# Patient Record
Sex: FEMALE | Race: WHITE | NOT HISPANIC OR LATINO
[De-identification: names, ages, dates, MRNs, and addresses within clinical notes are randomized per-mention and may not be internally consistent; named-entity substitution may affect disease eponyms.]

---

## 2018-01-29 PROBLEM — Z00.00 ENCOUNTER FOR PREVENTIVE HEALTH EXAMINATION: Status: ACTIVE | Noted: 2018-01-29

## 2019-06-21 ENCOUNTER — APPOINTMENT (OUTPATIENT)
Dept: ORTHOPEDIC SURGERY | Facility: CLINIC | Age: 84
End: 2019-06-21
Payer: MEDICARE

## 2019-06-21 VITALS — BODY MASS INDEX: 21.16 KG/M2 | HEIGHT: 65 IN | WEIGHT: 127 LBS

## 2019-06-21 PROCEDURE — 99203 OFFICE O/P NEW LOW 30 MIN: CPT

## 2019-06-21 NOTE — HISTORY OF PRESENT ILLNESS
[de-identified] : 85 year old female presents for an evaluation of right hand mass that began developing years ago and she cannot attribute her pain to any specific injury or event. She has previously obtained a CT scan and XRays of her right hand, she presents with the films and imaging. Today she rates her pain a 0/10 and reports that she is not experiencing any pain about her hand or limitations in use of her right hand. She has no other complaints at this time.

## 2019-06-21 NOTE — ADDENDUM
[FreeTextEntry1] : I, Yvette Calixto, acted solely as a scribe for Dr. Michael Martínez on this date 06/21/2019.

## 2019-06-21 NOTE — END OF VISIT
[FreeTextEntry3] : All medical record entries made by the [facility] were at my, Dr. Michael Martínez, direction and personally dictated by me on 06/21/2019. I have reviewed the chart and agree that the record accurately reflects my personal performance of the history, physical exam, assessment and plan. I have also personally directed, reviewed, and agreed with the chart.

## 2019-06-21 NOTE — DISCUSSION/SUMMARY
[de-identified] : The underlying pathophysiology was reviewed in great detail with the patient as well as the various treatment options, including conservative treatment, observation, biopsy and surgery.\par \par I recommend that she follow up with an orthopedic tumor specialist, a referral was provided for Dr. Augustin Mendoza.

## 2019-06-24 ENCOUNTER — APPOINTMENT (OUTPATIENT)
Dept: ORTHOPEDIC SURGERY | Facility: CLINIC | Age: 84
End: 2019-06-24
Payer: MEDICARE

## 2019-06-24 DIAGNOSIS — R22.31 LOCALIZED SWELLING, MASS AND LUMP, RIGHT UPPER LIMB: ICD-10-CM

## 2019-06-24 PROCEDURE — 73130 X-RAY EXAM OF HAND: CPT | Mod: RT

## 2019-06-24 PROCEDURE — 99214 OFFICE O/P EST MOD 30 MIN: CPT

## 2019-06-24 NOTE — HISTORY OF PRESENT ILLNESS
[Stable] : stable [0] : currently ~his/her~ pain is 0 out of 10 [FreeTextEntry1] : This is an 85-year-old female comes in complaining of a RIGHT hand mass the she is known about some swelling between the 2nd and 3rd metacarpals dorsally but finds that in the past year she has had a hard mass in her palm. She has not stopped her from doing activities. She is able to make a fist. She does not have any pain associated with the mass. She had imaging done approximately 8 months ago and eventually went to a general orthopaedist to send the patient to me.

## 2019-06-24 NOTE — DATA REVIEWED
[Imaging Present] : Present [de-identified] : CT scan from 10/16/2018Shows a complex fatty mass that is going between the 1st and 2nd web space as well as between the 2nd 3rd and 3rd and 4th metacarpals. There is a calcified portion of this in the palm of the hand palmar to the metacarpals. This is deep to the flexor tendons but does not seem to detach any portion of the bone.\par \par x-rays today show both normal position. There is a well corticated mature bone seen in the palm of the hand over the arpals.

## 2019-06-24 NOTE — DISCUSSION/SUMMARY
[All Questions Answered] : Patient (and family) had all questions answered to an agreeable level of satisfaction [Interested in Proceeding] : Patient (and family) expressed understanding and interest in proceeding with the plan as outlined [de-identified] : This seems consistent with a calcifying lipoma however it is very unclear whether this is his new or has changed. In any event her recommendations are to get new imaging as the imaging she has already 8 months old. After this is done we can revisit whether she needs to think about surgical treatment or not however this has been here for significant amount of time it is unlikely that she will need anything other than observation. Followup after CT scan.\par \par If imaging was ordered, the patient was told to make an appointment to review findings right after all imaging is completed.\par \par We discussed risks, benefits and alternatives. Rationale of care was reviewed and all questions were answered. Patient (and family) had all questions answered to her degree of the level of satisfaction. Patient (and family) expressed understanding and interest in proceeding with the plan as outlined.\par \par \par \par \par This note was done with a voice recognition transcription software and any typos are related to this rather than medical error.\par

## 2019-06-24 NOTE — PHYSICAL EXAM
[General Appearance - Well-Appearing] : Well appearing [General Appearance - Well Nourished] : well nourished [Oriented To Time, Place, And Person] : Oriented to person, place, and time [Impaired Insight] : Insight and judgment were intact [Affect] : The affect was normal. [Mood] : the mood was normal [Sclera] : the sclera and conjunctiva were normal [Neck Cervical Mass (___cm)] : no neck mass was observed [Heart Rate And Rhythm] : heart rate was normal and rhythm regular [] : No respiratory distress [Abdomen Soft] : Soft [Normal Station and Gait] : gait and station were normal [Swelling] : swelling [Masses] : masses [Full UE ROM unless otherwise noted:] : Full range of motion unless otherwise noted. [Normal] : Sensation intact to light touch. [FreeTextEntry1] : On exam the patient has a mass in her dominant RIGHT hand. She is minimally tender. She did not like the cosmetic deformity in between the 2nd and 3rd metacarpals. She has a palpable hard mass in the palm of her hand she has no Tinel's, thrills or bruits. She is able to move everything appropriately. [Tenderness] : no tenderness [Skin Changes - Describe changes:] : No skin changes noted

## 2020-12-23 ENCOUNTER — EMERGENCY (EMERGENCY)
Facility: HOSPITAL | Age: 85
LOS: 1 days | Discharge: HOME | End: 2020-12-23
Attending: EMERGENCY MEDICINE | Admitting: EMERGENCY MEDICINE
Payer: MEDICARE

## 2020-12-23 VITALS
OXYGEN SATURATION: 98 % | TEMPERATURE: 98 F | SYSTOLIC BLOOD PRESSURE: 121 MMHG | HEART RATE: 69 BPM | DIASTOLIC BLOOD PRESSURE: 75 MMHG | RESPIRATION RATE: 16 BRPM

## 2020-12-23 VITALS
OXYGEN SATURATION: 98 % | DIASTOLIC BLOOD PRESSURE: 80 MMHG | RESPIRATION RATE: 16 BRPM | TEMPERATURE: 97 F | SYSTOLIC BLOOD PRESSURE: 126 MMHG | HEART RATE: 64 BPM

## 2020-12-23 PROCEDURE — 70450 CT HEAD/BRAIN W/O DYE: CPT

## 2020-12-23 PROCEDURE — 72170 X-RAY EXAM OF PELVIS: CPT | Mod: 26

## 2020-12-23 PROCEDURE — 99284 EMERGENCY DEPT VISIT MOD MDM: CPT | Mod: 25

## 2020-12-23 PROCEDURE — 72110 X-RAY EXAM L-2 SPINE 4/>VWS: CPT | Mod: 26

## 2020-12-23 PROCEDURE — 72170 X-RAY EXAM OF PELVIS: CPT

## 2020-12-23 PROCEDURE — 72110 X-RAY EXAM L-2 SPINE 4/>VWS: CPT

## 2020-12-23 PROCEDURE — 99285 EMERGENCY DEPT VISIT HI MDM: CPT | Mod: 25

## 2020-12-23 PROCEDURE — 70450 CT HEAD/BRAIN W/O DYE: CPT | Mod: 26

## 2020-12-23 RX ORDER — ACETAMINOPHEN 500 MG
650 TABLET ORAL ONCE
Refills: 0 | Status: COMPLETED | OUTPATIENT
Start: 2020-12-23 | End: 2020-12-23

## 2020-12-23 RX ORDER — LIDOCAINE 4 G/100G
1 CREAM TOPICAL ONCE
Refills: 0 | Status: COMPLETED | OUTPATIENT
Start: 2020-12-23 | End: 2020-12-23

## 2020-12-23 RX ADMIN — LIDOCAINE 1 PATCH: 4 CREAM TOPICAL at 19:14

## 2020-12-23 RX ADMIN — Medication 650 MILLIGRAM(S): at 19:13

## 2020-12-23 NOTE — ED PROVIDER NOTE - PATIENT PORTAL LINK FT
You can access the FollowMyHealth Patient Portal offered by [facility] by registering at the following website: [email or web address]. By joining [employer]’s FollowMyHealth portal, you will also be able to view your health information using other applications (apps) compatible with our system.

## 2020-12-23 NOTE — ED PROVIDER NOTE - PROGRESS NOTE DETAILS
Pt reports improvement, CT negative, XR no acute fx or disloc noted on wet read; pt advised supportive care and f/u with ortho/spine, pt well appearing, ambulating well. All results discussed with patient and grandson Jaziel. Both given opportunity to have all questions answered. Both verbalize agreement and understanding of plan. No emergent concerns at this time. Pt stable for DC home.

## 2020-12-23 NOTE — ED PROVIDER NOTE - OBJECTIVE STATEMENT
86 yo F PMHx osteopenia, HLD, Afib on Coumadin presents to ED c/o low back pain s/p mechanical trip and fall on carpet just prior to arrival. Pt states that she tripped and fell backwards, admits to hitting her head but denies LOC/HA, dizziness, visual changes. Pt reports aching pain to low back. Pt denies tx prior to arrival. Pt denies chest pain, SOB, abd pain, numbness/tingling, bladder/bowel incontinence, recent travel, known sick/covid contacts, dysuria, fever/chills, recent illness.

## 2020-12-23 NOTE — ED PROVIDER NOTE - ATTENDING CONTRIBUTION TO CARE
pt with c/o LBP s/p mechanical trip and fall.  Ambulating in the ED without difficulty.  on coumadin    PE: well appearing no distress  no obvious trauma  left lumbar paraspinal tenderness    xray, CT head

## 2020-12-23 NOTE — ED PROVIDER NOTE - CARE PROVIDER_API CALL
Chilango Lockett  ORTHOPAEDIC SURGERY  [address] [facility] [facility] [address] [facility], [place] [address]  Phone: [phone]  Fax: [phone]  Follow Up Time:

## 2020-12-23 NOTE — ED PROVIDER NOTE - CONSTITUTIONAL, MLM
normal... Well appearing, awake, alert, oriented to person, place, time/situation and in no apparent distress. Ambulating without assistance with no difficulty

## 2020-12-23 NOTE — ED PROVIDER NOTE - NSFOLLOWUPINSTRUCTIONS_ED_ALL_ED_FT
1) OTC Tylenol 650-1000 mg every 4-6 hours as needed for pain (Max 4000 mg/day) Follow up with your doctor/ortho-spine in 1-2 days.  2) Return to the ER for worsening or concerning symptoms. 1) OTC Tylenol 650-1000 mg every 4-6 hours as needed for pain (Max 4000 mg/day) Follow up with your doctor/ortho-spine in 1-2 days.  2) Return to the ER for worsening or concerning symptoms.      Acute Low Back Pain    WHAT YOU NEED TO KNOW:    What is acute low back pain? Acute low back pain is sudden discomfort in your lower back area that lasts for up to 6 weeks. The discomfort makes it difficult to tolerate activity.     What causes or increases my risk for acute low back pain? Conditions that affect the spine, joints, or muscles can cause back pain. These may include arthritis, spinal stenosis (narrowing of the spinal column), muscle tension, or breakdown of the spinal discs. The following increase your risk of back pain:   •Repeated bending, lifting, or twisting, or lifting heavy items      •Injury from a fall or accident      •Lack of regular physical activity       •Obesity or pregnancy       •Smoking      •Aging      •Driving, sitting, or standing for long periods      •Bad posture while sitting or standing      How is acute low back pain diagnosed? Your healthcare provider will ask about your medical history and examine you. He or she may ask when you last had low back pain and how it started. Show him or her where you feel the pain and what makes it feel better or worse. Tell your provider about the type of pain you have, how bad it is, and how long it lasts. Tell him or her if your pain worsens at night or when you lie down on your back.    How is acute low back pain treated? The goal of treatment is to relieve your pain and help you tolerate activity. Most people with acute low back pain get better within 4 to 6 weeks. You may need any of the following:  •NSAIDs help decrease swelling and pain. This medicine is available with or without a doctor's order. NSAIDs can cause stomach bleeding or kidney problems in certain people. If you take blood thinner medicine, always ask your healthcare provider if NSAIDs are safe for you. Always read the medicine label and follow directions.      •Acetaminophen decreases pain and fever. It is available without a doctor's order. Ask how much to take and how often to take it. Follow directions. Read the labels of all other medicines you are using to see if they also contain acetaminophen, or ask your doctor or pharmacist. Acetaminophen can cause liver damage if not taken correctly. Do not use more than 4 grams (4,000 milligrams) total of acetaminophen in one day.       •Muscle relaxers decrease pain by relaxing the muscles in your lower spine.      •Prescription pain medicine may be given. Ask your healthcare provider how to take this medicine safely. Some prescription pain medicines contain acetaminophen. Do not take other medicines that contain acetaminophen without talking to your healthcare provider. Too much acetaminophen may cause liver damage. Prescription pain medicine may cause constipation. Ask your healthcare provider how to prevent or treat constipation.       What can I do to prevent low back pain?   •Use proper body mechanics. ?Bend at the hips and knees when you  objects. Do not bend from the waist. Use your leg muscles as you lift the load. Do not use your back. Keep the object close to your chest as you lift it. Try not to twist or lift anything above your waist.      ?Change your position often when you stand for long periods of time. Rest one foot on a small box or footrest, and then switch to the other foot often.      ?Try not to sit for long periods of time. When you do, sit in a straight-backed chair with your feet flat on the floor. Never reach, pull, or push while you are sitting.      •Do exercises that strengthen your back muscles. Warm up before you exercise. Ask your healthcare provider the best exercises for you.      •Maintain a healthy weight. Ask your healthcare provider how much you should weigh. Ask him or her to help you create a weight loss plan if you are overweight.      How can I take care of myself if I have acute low back pain?   •Stay active as much as you can without causing more pain. Bed rest could make your back pain worse. Start with some light exercises, such as walking. Avoid heavy lifting until your pain is gone. Ask for more information about the activities or exercises that are right for you.       •Apply ice on your back for 15 to 20 minutes every hour or as directed. Use an ice pack, or put crushed ice in a plastic bag. Cover it with a towel before you apply it to your skin. Ice helps prevent tissue damage and decreases swelling and pain.       •Apply heat on your back for 20 to 30 minutes every 2 hours for as many days as directed. Heat helps decrease pain and muscle spasms. Alternate heat and ice.      When should I seek immediate care?   •You have severe pain.      •You have sudden stiffness and heaviness down both legs.      •You have numbness or weakness in one leg, or pain in both legs.      •You have numbness in your genital area or across your lower back.      •You cannot control your urine or bowel movements.      When should I contact my healthcare provider?   •You have a fever.      •You have pain at night or when you rest.      •Your pain does not get better with treatment.      •You have pain that worsens when you cough or sneeze.      •You suddenly feel something pop or snap in your back.      •You have questions or concerns about your condition or care.      CARE AGREEMENT:    You have the right to help plan your care. Learn about your health condition and how it may be treated. Discuss treatment options with your healthcare providers to decide what care you want to receive. You always have the right to refuse treatment.

## 2020-12-23 NOTE — ED PROVIDER NOTE - CLINICAL SUMMARY MEDICAL DECISION MAKING FREE TEXT BOX
86 yo F p/w LBP s/p mechanical fall, +head trauma, on Coumadin--- > XR pelvis/LS spine, CT brain, analgesia, likely dc home

## 2020-12-23 NOTE — ED ADULT NURSE NOTE - OBJECTIVE STATEMENT
pt aox4, " fell today, lower back, head, rt shoulder injury" no loc, ambulatory steady gait, FROM 4 extremities

## 2020-12-23 NOTE — ED ADULT NURSE REASSESSMENT NOTE - NS ED NURSE REASSESS COMMENT FT1
pt aox4, disch to see PMD with family, Jaziel, feeling much better, ambulatory, no n/v, no dizziness, tolerating oral fluids well

## 2020-12-23 NOTE — ED ADULT NURSE NOTE - NSIMPLEMENTINTERV_GEN_ALL_ED
Implemented All Fall Risk Interventions:  [place] to call system. Call bell, personal items and telephone within reach. Instruct patient to call for assistance. Room bathroom lighting operational. Non-slip footwear when patient is off stretcher. Physically safe environment: no spills, clutter or unnecessary equipment. Stretcher in lowest position, wheels locked, appropriate side rails in place. Provide visual cue, wrist band, yellow gown, etc. Monitor gait and stability. Monitor for mental status changes and reorient to person, place, and time. Review medications for side effects contributing to fall risk. Reinforce activity limits and safety measures with patient and family.

## 2021-02-20 ENCOUNTER — EMERGENCY (EMERGENCY)
Facility: HOSPITAL | Age: 86
LOS: 1 days | Discharge: HOME | End: 2021-02-20
Attending: EMERGENCY MEDICINE | Admitting: EMERGENCY MEDICINE
Payer: MEDICARE

## 2021-02-20 VITALS
DIASTOLIC BLOOD PRESSURE: 84 MMHG | HEART RATE: 72 BPM | HEIGHT: 65 IN | RESPIRATION RATE: 14 BRPM | WEIGHT: 149.91 LBS | OXYGEN SATURATION: 98 % | SYSTOLIC BLOOD PRESSURE: 154 MMHG | TEMPERATURE: 98 F

## 2021-02-20 VITALS — SYSTOLIC BLOOD PRESSURE: 134 MMHG | HEART RATE: 68 BPM | DIASTOLIC BLOOD PRESSURE: 72 MMHG | RESPIRATION RATE: 16 BRPM

## 2021-02-20 PROBLEM — E78.00 PURE HYPERCHOLESTEROLEMIA, UNSPECIFIED: Chronic | Status: ACTIVE | Noted: 2020-12-23

## 2021-02-20 PROBLEM — I48.91 UNSPECIFIED ATRIAL FIBRILLATION: Chronic | Status: ACTIVE | Noted: 2020-12-23

## 2021-02-20 PROBLEM — M85.80 OTHER SPECIFIED DISORDERS OF BONE DENSITY AND STRUCTURE, UNSPECIFIED SITE: Chronic | Status: ACTIVE | Noted: 2020-12-23

## 2021-02-20 LAB
APTT BLD: 45.5 SEC — HIGH (ref 27.5–35.5)
INR BLD: 2.9 RATIO — HIGH (ref 0.88–1.16)
PROTHROM AB SERPL-ACNC: 32.3 SEC — HIGH (ref 10.6–13.6)

## 2021-02-20 PROCEDURE — 71045 X-RAY EXAM CHEST 1 VIEW: CPT

## 2021-02-20 PROCEDURE — 72131 CT LUMBAR SPINE W/O DYE: CPT | Mod: 26,ME

## 2021-02-20 PROCEDURE — 72125 CT NECK SPINE W/O DYE: CPT | Mod: 26

## 2021-02-20 PROCEDURE — 36415 COLL VENOUS BLD VENIPUNCTURE: CPT

## 2021-02-20 PROCEDURE — 72131 CT LUMBAR SPINE W/O DYE: CPT

## 2021-02-20 PROCEDURE — 70450 CT HEAD/BRAIN W/O DYE: CPT | Mod: 26

## 2021-02-20 PROCEDURE — 72110 X-RAY EXAM L-2 SPINE 4/>VWS: CPT

## 2021-02-20 PROCEDURE — 71045 X-RAY EXAM CHEST 1 VIEW: CPT | Mod: 26

## 2021-02-20 PROCEDURE — 99284 EMERGENCY DEPT VISIT MOD MDM: CPT | Mod: 25

## 2021-02-20 PROCEDURE — 85730 THROMBOPLASTIN TIME PARTIAL: CPT

## 2021-02-20 PROCEDURE — G1004: CPT

## 2021-02-20 PROCEDURE — 70450 CT HEAD/BRAIN W/O DYE: CPT

## 2021-02-20 PROCEDURE — 85610 PROTHROMBIN TIME: CPT

## 2021-02-20 PROCEDURE — 72110 X-RAY EXAM L-2 SPINE 4/>VWS: CPT | Mod: 26

## 2021-02-20 PROCEDURE — 99285 EMERGENCY DEPT VISIT HI MDM: CPT

## 2021-02-20 PROCEDURE — 72125 CT NECK SPINE W/O DYE: CPT

## 2021-02-20 NOTE — ED PROVIDER NOTE - SECONDARY DIAGNOSIS.
Acute low back pain without sciatica, unspecified back pain laterality Compression fracture of body of thoracic vertebra

## 2021-02-20 NOTE — ED ADULT NURSE NOTE - NSIMPLEMENTINTERV_GEN_ALL_ED
Implemented All Fall with Harm Risk Interventions:  [place] to call system. Call bell, personal items and telephone within reach. Instruct patient to call for assistance. Room bathroom lighting operational. Non-slip footwear when patient is off stretcher. Physically safe environment: no spills, clutter or unnecessary equipment. Stretcher in lowest position, wheels locked, appropriate side rails in place. Provide visual cue, wrist band, yellow gown, etc. Monitor gait and stability. Monitor for mental status changes and reorient to person, place, and time. Review medications for side effects contributing to fall risk. Reinforce activity limits and safety measures with patient and family. Provide visual clues: red socks.

## 2021-02-20 NOTE — ED PROVIDER NOTE - OBJECTIVE STATEMENT
88 yo F p/w [facility] fall today- pt was getting out of bed and fell to ground. Pt not on floor for prolonged period of time. no HA/n/v/dizzy. no loc. No cp/sob/palp. Pt co low back pain since fall. non-radiating. no other acute inj. No weakness / dizziness. no fever/chills. no known recent illness / covid exposure. No other acute co.

## 2021-02-20 NOTE — ED PROVIDER NOTE - NSFOLLOWUPINSTRUCTIONS_ED_ALL_ED_FT
1) Follow-up with your Primary Medical Doctor. Call monday for prompt follow-up.  2) Return to Emergency room for any worsening or persistent pain, weakness, fever, unable to walk properly, unable to care for herself, dizziness / falling, or any other concerning symptoms.  3) See attached instruction sheets for additional information, including information regarding signs and symptoms to look out for, reasons to seek immediate care and other important instructions.  4) Follow-up with orthopedics this week as discussed  5) Tylenol as needed for pain

## 2021-02-20 NOTE — ED ADULT NURSE NOTE - NSINTERVENTIONOPT_GEN_ALL_ED
Stretcher Alarms/Chair Alarms/Hourly Rounding/Enhanced Supervision/Move Toilet (commode/urinal) to patient using "arms reach" Stretcher Alarms/Hourly Rounding/Enhanced Supervision

## 2021-02-20 NOTE — ED PROVIDER NOTE - PROGRESS NOTE DETAILS
Ron Sheriff - Pt ? hit head, no recent covid exposure / prior infxn. !st immuno scheduled next week Pt doing well, was able to walk around with walker - unassisted. Pt feeling well, wishes to go home. Ron Sheriff - agree with dc, will have pt fu with [facility] orthopedics this week.  Dw him re fall prec / inst, back inj prec / inst and importance of close, prompt fu with pmd and ortho.

## 2021-02-20 NOTE — ED PROVIDER NOTE - CARE PROVIDER_API CALL
ERWIN COOK  Internal Medicine  [address], [address]  [place] [address]  Phone: [phone]  Fax: [phone]  Follow Up Time:     Johnathon Jenkins  ORTHOPAEDIC SURGERY  [address] [facility], [address] [place] [address]  Phone: [phone]  Fax: [phone]  Follow Up Time:

## 2021-02-20 NOTE — ED ADULT NURSE REASSESSMENT NOTE - NS ED NURSE REASSESS COMMENT FT1
pt is A&Ox4 with episodes of confusion (hx of dementia) pt presented to the ER post fall, no facial injury noted, no bruises noted, pt states of having lower back ache, pulses +, skin intact, pt property with the pt, resp even and unlabored, nad noted, will continue to monitor

## 2021-02-20 NOTE — ED PROVIDER NOTE - PATIENT PORTAL LINK FT
3-5x/week/M-F
You can access the FollowMyHealth Patient Portal offered by [facility] by registering at the following website: [email or web address]. By joining [employer]’s FollowMyHealth portal, you will also be able to view your health information using other applications (apps) compatible with our system.

## 2021-02-20 NOTE — ED PROVIDER NOTE - MUSCULOSKELETAL, MLM
Spine appears normal, no midline spinal tend (c,t,l). Pos paraspinal lumbar tend bl., no crepitus. No deformity. range of motion is not limited, no muscle or joint tenderness

## 2021-02-20 NOTE — ED PROVIDER NOTE - CARE PLAN
Principal Discharge DX:	Fall, initial encounter  Secondary Diagnosis:	Compression fracture of body of thoracic vertebra  Secondary Diagnosis:	Acute low back pain without sciatica, unspecified back pain laterality

## 2021-05-14 ENCOUNTER — APPOINTMENT (OUTPATIENT)
Dept: GASTROENTEROLOGY | Facility: CLINIC | Age: 86
End: 2021-05-14
Payer: MEDICARE

## 2021-05-14 VITALS
OXYGEN SATURATION: 100 % | WEIGHT: 121 LBS | HEIGHT: 63 IN | SYSTOLIC BLOOD PRESSURE: 108 MMHG | TEMPERATURE: 96.6 F | BODY MASS INDEX: 21.44 KG/M2 | DIASTOLIC BLOOD PRESSURE: 70 MMHG | HEART RATE: 72 BPM

## 2021-05-14 DIAGNOSIS — K86.2 CYST OF PANCREAS: ICD-10-CM

## 2021-05-14 DIAGNOSIS — Q45.3 OTHER CONGENITAL MALFORMATIONS OF PANCREAS AND PANCREATIC DUCT: ICD-10-CM

## 2021-05-14 DIAGNOSIS — Z86.79 PERSONAL HISTORY OF OTHER DISEASES OF THE CIRCULATORY SYSTEM: ICD-10-CM

## 2021-05-14 DIAGNOSIS — Z86.39 PERSONAL HISTORY OF OTHER ENDOCRINE, NUTRITIONAL AND METABOLIC DISEASE: ICD-10-CM

## 2021-05-14 DIAGNOSIS — Z87.39 PERSONAL HISTORY OF OTHER DISEASES OF THE MUSCULOSKELETAL SYSTEM AND CONNECTIVE TISSUE: ICD-10-CM

## 2021-05-14 PROCEDURE — 99072 ADDL SUPL MATRL&STAF TM PHE: CPT

## 2021-05-14 PROCEDURE — 99203 OFFICE O/P NEW LOW 30 MIN: CPT

## 2021-05-14 RX ORDER — WARFARIN 5 MG/1
5 TABLET ORAL
Refills: 0 | Status: ACTIVE | COMMUNITY

## 2021-05-14 RX ORDER — FOLIC ACID 1 MG/1
1 TABLET ORAL
Refills: 0 | Status: ACTIVE | COMMUNITY

## 2021-05-14 RX ORDER — PAROXETINE HYDROCHLORIDE 20 MG/1
20 TABLET, FILM COATED ORAL
Refills: 0 | Status: ACTIVE | COMMUNITY

## 2021-05-14 RX ORDER — DILTIAZEM HYDROCHLORIDE 120 MG/1
120 CAPSULE, COATED, EXTENDED RELEASE ORAL
Refills: 0 | Status: ACTIVE | COMMUNITY

## 2021-05-14 RX ORDER — WARFARIN SODIUM 10 MG/1
10 TABLET ORAL
Refills: 0 | Status: ACTIVE | COMMUNITY

## 2021-05-14 RX ORDER — METOPROLOL SUCCINATE 25 MG/1
25 TABLET, EXTENDED RELEASE ORAL
Refills: 0 | Status: ACTIVE | COMMUNITY

## 2021-05-14 RX ORDER — MEMANTINE HYDROCHLORIDE 5 MG/1
5 TABLET, FILM COATED ORAL
Refills: 0 | Status: ACTIVE | COMMUNITY

## 2021-05-14 NOTE — PHYSICAL EXAM
[General Appearance - Alert] : alert [General Appearance - In No Acute Distress] : in no acute distress [General Appearance - Well Nourished] : well nourished [Auscultation Breath Sounds / Voice Sounds] : lungs were clear to auscultation bilaterally [Apical Impulse] : the apical impulse was normal [Heart Rate And Rhythm] : heart rate was normal and rhythm regular [Heart Sounds] : normal S1 and S2 [Bowel Sounds] : normal bowel sounds [Abdomen Soft] : soft [Abdomen Tenderness] : non-tender [] : no hepato-splenomegaly

## 2021-05-14 NOTE — ASSESSMENT
[FreeTextEntry1] : Mrs. Castro is an 87-year-old female who has a history of atrial fibrillation and has a permanent pacemaker.  Her cardiac symptoms are relatively well controlled and currently she is restricted in her movement due to recent back trauma.  A CAT scan reveals pancreas divisum as well as a questionable suspicious lesion in the mid pancreatic body.  Currently the patient is not demonstrating any signs of advanced pancreatic cancer and the CAT scan does not reveal a krys-pancreatic inflammatory process or lymph nodes.  I had a lengthy discussion with the patient and her daughter who was present.  Certainly there is a possibility that we are dealing with an early pancreatic neoplasm which is currently asymptomatic since it is in the body.  There is ductal dilatation distal to the lesion which is suspicious.  The patient cannot undergo an MRI because of her pacemaker.  This would have been a useful test in further evaluating the pancreatic parenchyma.  The patient and her daughter currently do not want to undergo invasive work-up.  This would include a possible EUS and biopsy which would necessitate her going into the hospital and stopping her Coumadin for several days.  The patient's daughter states that even if a neoplasm is found the patient would not agree to undergo any extensive surgery or treatment.  I respect their opinion especially because of the fact they are aware that this could indeed be a cancer.  They are also hesitant to see a urologist since they probably would not pursue work-up of the renal parenchymal abnormality found on the CAT scan.  As a compromise I opted to send the patient for full blood work and a CA 19-9, if the CA 19-9 is markedly elevated this would lead us more towards a neoplastic diagnosis and prepare the patient for the future.  If it is normal it does not totally rule out a neoplastic process ,however.  Once the blood work is available we will review it and decide on the appropriate approach.  The daughter also questioned  the need for checking the colon.  This brings about the same discourse as to whether the patient would be willing to undergo a colonoscopy if pathology is found on less invasive testing and obviously they decided no.  The CAT scan does not reveal any significant colonic pathology at the present time.

## 2021-05-14 NOTE — REVIEW OF SYSTEMS
[Shortness Of Breath] : no shortness of breath [Wheezing] : no wheezing [Negative] : Gastrointestinal [FreeTextEntry5] : History of atrial fibrillation but currently asymptomatic. [FreeTextEntry7] : No significant weight loss no dyspepsia or reflux or change in her bowel habits.

## 2021-05-14 NOTE — HISTORY OF PRESENT ILLNESS
[FreeTextEntry1] : I saw Mrs. Lacy Castro in the office today.  The patient is an 87-year-old female has a history of atrial fibrillation currently under control.  She sees her cardiologist on a regular basis.  The patient has a pacemaker and has no current chest pain or shortness of breath.  Her activity is limited by the fact that she injured her back recently.  When the patient went for scanning to evaluate the spine it was found that she did have a lesion in the mid pancreatic body and the subset of pancreas disease him.  The patient denied any back pain prior to falling.  Her appetite remains good with no unexplained weight loss.  She has not been icteric.  The CAT scan also did reveal some abnormalities in the kidney for which she will be seeing a urologist.  The patient denies a history of any tobacco caffeine or ethanol use.  Family history is significant for her daughter who had colon cancer.

## 2021-05-17 LAB
ALBUMIN SERPL ELPH-MCNC: 4.1 G/DL
ALP BLD-CCNC: 48 U/L
ALT SERPL-CCNC: 18 U/L
AMYLASE/CREAT SERPL: 82 U/L
ANION GAP SERPL CALC-SCNC: 11 MMOL/L
AST SERPL-CCNC: 21 U/L
BASOPHILS # BLD AUTO: 0.04 K/UL
BASOPHILS NFR BLD AUTO: 0.6 %
BILIRUB SERPL-MCNC: 0.4 MG/DL
BUN SERPL-MCNC: 32 MG/DL
CALCIUM SERPL-MCNC: 9.1 MG/DL
CANCER AG19-9 SERPL-ACNC: 29 U/ML
CHLORIDE SERPL-SCNC: 102 MMOL/L
CO2 SERPL-SCNC: 25 MMOL/L
CREAT SERPL-MCNC: 1.11 MG/DL
EOSINOPHIL # BLD AUTO: 0.12 K/UL
EOSINOPHIL NFR BLD AUTO: 1.7 %
GLUCOSE SERPL-MCNC: 48 MG/DL
HCT VFR BLD CALC: 44.6 %
HGB BLD-MCNC: 14.1 G/DL
IMM GRANULOCYTES NFR BLD AUTO: 0.6 %
LPL SERPL-CCNC: 30 U/L
LYMPHOCYTES # BLD AUTO: 1.69 K/UL
LYMPHOCYTES NFR BLD AUTO: 23.6 %
MAN DIFF?: NORMAL
MCHC RBC-ENTMCNC: 31.6 GM/DL
MCHC RBC-ENTMCNC: 31.6 PG
MCV RBC AUTO: 100 FL
MONOCYTES # BLD AUTO: 0.71 K/UL
MONOCYTES NFR BLD AUTO: 9.9 %
NEUTROPHILS # BLD AUTO: 4.55 K/UL
NEUTROPHILS NFR BLD AUTO: 63.6 %
PLATELET # BLD AUTO: 259 K/UL
POTASSIUM SERPL-SCNC: 4.6 MMOL/L
PROT SERPL-MCNC: 6.4 G/DL
RBC # BLD: 4.46 M/UL
RBC # FLD: 14.2 %
SODIUM SERPL-SCNC: 138 MMOL/L
WBC # FLD AUTO: 7.15 K/UL

## 2021-07-01 NOTE — PHYSICAL EXAM
POC Glu 110, 108, 121 [de-identified] : Right Upper Extremity:\par o Hand :\par ¦ Inspection/Palpation : no tenderness to palpation or pain with axial compression, soft immobile mass over dorsum of hand overlying the 2nd and 3rd metacarpal space that is approximately 3.0 x 1.5 cm, firm bony mass in the radial side of the palm that is approximately 2.0 x 2.0 cm\par ¦ Range of Motion : full arc of motion in the small joints of the hand, no discomfort elicited\par ¦ Strength : all intrinsic and extrinsic hand muscles 5/5\par ¦ Stability : no joint instability on provocative testing\par o Muscle Bulk : no atrophy\par o Sensation : sensation intact to light touch\par o Skin : no skin lesions, no discoloration\par o Vascular Exam : no edema, no cyanosis, radial and ulnar pulses normal  [de-identified] : o XRays of the right hand was obtained at [facility] on 10/8/2018, revealed:\par Ossified mass within the volar aspect of the hand, no fractures.\par \par o A CT scan of the right hand was obtained at [facility] on 10/16/2018, revealed:\par 4.9 x 4.2 x 5.1 cm infiltrative mass off predominantly fatty attenuation center within the palmar aspect of the hand with dorsal extension between the second and third as well as the third and fourth metacarpals, . This mass demonstrates multiple internal septations as well as peripheral ossification and appears separate from the adjacent osseous structures.\par Differential diagnostic considerations include a fatty neoplasm such as an atypical lipomatous tumor or possibly even a low-grad, well-differentiated liposarcoma. Other rare entities such as a periosteal or parosteal lipoma would be an additional consideration. The ossification within the lesion may be related to osseous metaplasia versus heterotopic ossification and setting of prior or chronic repetitive traumatic injury of the lesion.

## 2021-09-09 ENCOUNTER — INPATIENT (INPATIENT)
Facility: HOSPITAL | Age: 86
LOS: 5 days | Discharge: HOME | DRG: 481 | End: 2021-09-15
Attending: INTERNAL MEDICINE | Admitting: INTERNAL MEDICINE
Payer: MEDICARE

## 2021-09-09 VITALS
HEIGHT: 65 IN | TEMPERATURE: 99 F | OXYGEN SATURATION: 94 % | HEART RATE: 71 BPM | SYSTOLIC BLOOD PRESSURE: 100 MMHG | DIASTOLIC BLOOD PRESSURE: 68 MMHG | RESPIRATION RATE: 18 BRPM | WEIGHT: 119.93 LBS

## 2021-09-09 DIAGNOSIS — S72.001A FRACTURE OF UNSPECIFIED PART OF NECK OF RIGHT FEMUR, INITIAL ENCOUNTER FOR CLOSED FRACTURE: ICD-10-CM

## 2021-09-09 LAB
ALBUMIN SERPL ELPH-MCNC: 2.9 G/DL — LOW (ref 3.3–5)
ALP SERPL-CCNC: 49 U/L — SIGNIFICANT CHANGE UP (ref 40–120)
ALT FLD-CCNC: 23 U/L — SIGNIFICANT CHANGE UP (ref 12–78)
ANION GAP SERPL CALC-SCNC: 7 MMOL/L — SIGNIFICANT CHANGE UP (ref 5–17)
APPEARANCE UR: CLEAR — SIGNIFICANT CHANGE UP
APTT BLD: 34.9 SEC — SIGNIFICANT CHANGE UP (ref 27.5–35.5)
AST SERPL-CCNC: 19 U/L — SIGNIFICANT CHANGE UP (ref 15–37)
BASOPHILS # BLD AUTO: 0.04 K/UL — SIGNIFICANT CHANGE UP (ref 0–0.2)
BASOPHILS NFR BLD AUTO: 0.7 % — SIGNIFICANT CHANGE UP (ref 0–2)
BILIRUB SERPL-MCNC: 0.7 MG/DL — SIGNIFICANT CHANGE UP (ref 0.2–1.2)
BILIRUB UR-MCNC: NEGATIVE — SIGNIFICANT CHANGE UP
BUN SERPL-MCNC: 22 MG/DL — SIGNIFICANT CHANGE UP (ref 7–23)
CALCIUM SERPL-MCNC: 8.9 MG/DL — SIGNIFICANT CHANGE UP (ref 8.5–10.1)
CHLORIDE SERPL-SCNC: 106 MMOL/L — SIGNIFICANT CHANGE UP (ref 96–108)
CO2 SERPL-SCNC: 28 MMOL/L — SIGNIFICANT CHANGE UP (ref 22–31)
COLOR SPEC: YELLOW — SIGNIFICANT CHANGE UP
CREAT SERPL-MCNC: 1.1 MG/DL — SIGNIFICANT CHANGE UP (ref 0.5–1.3)
DIFF PNL FLD: NEGATIVE — SIGNIFICANT CHANGE UP
EOSINOPHIL # BLD AUTO: 0.05 K/UL — SIGNIFICANT CHANGE UP (ref 0–0.5)
EOSINOPHIL NFR BLD AUTO: 0.8 % — SIGNIFICANT CHANGE UP (ref 0–6)
GLUCOSE SERPL-MCNC: 95 MG/DL — SIGNIFICANT CHANGE UP (ref 70–99)
GLUCOSE UR QL: NEGATIVE — SIGNIFICANT CHANGE UP
HCT VFR BLD CALC: 42.2 % — SIGNIFICANT CHANGE UP (ref 34.5–45)
HGB BLD-MCNC: 13.9 G/DL — SIGNIFICANT CHANGE UP (ref 11.5–15.5)
IMM GRANULOCYTES NFR BLD AUTO: 0.8 % — SIGNIFICANT CHANGE UP (ref 0–1.5)
INR BLD: 2.25 RATIO — HIGH (ref 0.88–1.16)
KETONES UR-MCNC: NEGATIVE — SIGNIFICANT CHANGE UP
LEUKOCYTE ESTERASE UR-ACNC: ABNORMAL
LYMPHOCYTES # BLD AUTO: 0.87 K/UL — LOW (ref 1–3.3)
LYMPHOCYTES # BLD AUTO: 14.5 % — SIGNIFICANT CHANGE UP (ref 13–44)
MAGNESIUM SERPL-MCNC: 2.3 MG/DL — SIGNIFICANT CHANGE UP (ref 1.6–2.6)
MCHC RBC-ENTMCNC: 31.4 PG — SIGNIFICANT CHANGE UP (ref 27–34)
MCHC RBC-ENTMCNC: 32.9 GM/DL — SIGNIFICANT CHANGE UP (ref 32–36)
MCV RBC AUTO: 95.3 FL — SIGNIFICANT CHANGE UP (ref 80–100)
MONOCYTES # BLD AUTO: 0.5 K/UL — SIGNIFICANT CHANGE UP (ref 0–0.9)
MONOCYTES NFR BLD AUTO: 8.3 % — SIGNIFICANT CHANGE UP (ref 2–14)
NEUTROPHILS # BLD AUTO: 4.5 K/UL — SIGNIFICANT CHANGE UP (ref 1.8–7.4)
NEUTROPHILS NFR BLD AUTO: 74.9 % — SIGNIFICANT CHANGE UP (ref 43–77)
NITRITE UR-MCNC: NEGATIVE — SIGNIFICANT CHANGE UP
NRBC # BLD: 0 /100 WBCS — SIGNIFICANT CHANGE UP (ref 0–0)
PH UR: 7 — SIGNIFICANT CHANGE UP (ref 5–8)
PLATELET # BLD AUTO: 214 K/UL — SIGNIFICANT CHANGE UP (ref 150–400)
POTASSIUM SERPL-MCNC: 4.5 MMOL/L — SIGNIFICANT CHANGE UP (ref 3.5–5.3)
POTASSIUM SERPL-SCNC: 4.5 MMOL/L — SIGNIFICANT CHANGE UP (ref 3.5–5.3)
PROT SERPL-MCNC: 6.8 G/DL — SIGNIFICANT CHANGE UP (ref 6–8.3)
PROT UR-MCNC: 15
PROTHROM AB SERPL-ACNC: 25.3 SEC — HIGH (ref 10.6–13.6)
RBC # BLD: 4.43 M/UL — SIGNIFICANT CHANGE UP (ref 3.8–5.2)
RBC # FLD: 14.1 % — SIGNIFICANT CHANGE UP (ref 10.3–14.5)
SARS-COV-2 RNA SPEC QL NAA+PROBE: SIGNIFICANT CHANGE UP
SODIUM SERPL-SCNC: 141 MMOL/L — SIGNIFICANT CHANGE UP (ref 135–145)
SP GR SPEC: 1.01 — SIGNIFICANT CHANGE UP (ref 1.01–1.02)
TROPONIN I SERPL-MCNC: <.015 NG/ML — SIGNIFICANT CHANGE UP (ref 0.01–0.04)
UROBILINOGEN FLD QL: NEGATIVE — SIGNIFICANT CHANGE UP
WBC # BLD: 6.01 K/UL — SIGNIFICANT CHANGE UP (ref 3.8–10.5)
WBC # FLD AUTO: 6.01 K/UL — SIGNIFICANT CHANGE UP (ref 3.8–10.5)

## 2021-09-09 PROCEDURE — 76376 3D RENDER W/INTRP POSTPROCES: CPT | Mod: 26

## 2021-09-09 PROCEDURE — 72192 CT PELVIS W/O DYE: CPT | Mod: 26,MG

## 2021-09-09 PROCEDURE — 72125 CT NECK SPINE W/O DYE: CPT | Mod: 26

## 2021-09-09 PROCEDURE — 99285 EMERGENCY DEPT VISIT HI MDM: CPT

## 2021-09-09 PROCEDURE — 93010 ELECTROCARDIOGRAM REPORT: CPT

## 2021-09-09 PROCEDURE — 71045 X-RAY EXAM CHEST 1 VIEW: CPT | Mod: 26

## 2021-09-09 PROCEDURE — 70450 CT HEAD/BRAIN W/O DYE: CPT | Mod: 26

## 2021-09-09 PROCEDURE — G1004: CPT

## 2021-09-09 PROCEDURE — 73502 X-RAY EXAM HIP UNI 2-3 VIEWS: CPT | Mod: 26,RT

## 2021-09-09 RX ORDER — DILTIAZEM HCL 120 MG
0 CAPSULE, EXT RELEASE 24 HR ORAL
Qty: 0 | Refills: 0 | DISCHARGE

## 2021-09-09 RX ORDER — MEMANTINE HYDROCHLORIDE 10 MG/1
10 TABLET ORAL DAILY
Refills: 0 | Status: DISCONTINUED | OUTPATIENT
Start: 2021-09-09 | End: 2021-09-11

## 2021-09-09 RX ORDER — OXYCODONE AND ACETAMINOPHEN 5; 325 MG/1; MG/1
1 TABLET ORAL EVERY 6 HOURS
Refills: 0 | Status: DISCONTINUED | OUTPATIENT
Start: 2021-09-09 | End: 2021-09-11

## 2021-09-09 RX ORDER — DILTIAZEM HCL 120 MG
1 CAPSULE, EXT RELEASE 24 HR ORAL
Qty: 0 | Refills: 0 | DISCHARGE

## 2021-09-09 RX ORDER — INFLUENZA VIRUS VACCINE 15; 15; 15; 15 UG/.5ML; UG/.5ML; UG/.5ML; UG/.5ML
0.5 SUSPENSION INTRAMUSCULAR ONCE
Refills: 0 | Status: COMPLETED | OUTPATIENT
Start: 2021-09-09 | End: 2021-09-15

## 2021-09-09 RX ORDER — METOPROLOL TARTRATE 50 MG
1 TABLET ORAL
Qty: 0 | Refills: 0 | DISCHARGE

## 2021-09-09 RX ORDER — DILTIAZEM HCL 120 MG
120 CAPSULE, EXT RELEASE 24 HR ORAL AT BEDTIME
Refills: 0 | Status: DISCONTINUED | OUTPATIENT
Start: 2021-09-09 | End: 2021-09-11

## 2021-09-09 RX ORDER — SIMVASTATIN 20 MG/1
20 TABLET, FILM COATED ORAL AT BEDTIME
Refills: 0 | Status: DISCONTINUED | OUTPATIENT
Start: 2021-09-09 | End: 2021-09-10

## 2021-09-09 RX ORDER — MEMANTINE HYDROCHLORIDE 10 MG/1
1 TABLET ORAL
Qty: 0 | Refills: 0 | DISCHARGE

## 2021-09-09 RX ORDER — DILTIAZEM HCL 120 MG
240 CAPSULE, EXT RELEASE 24 HR ORAL DAILY
Refills: 0 | Status: DISCONTINUED | OUTPATIENT
Start: 2021-09-09 | End: 2021-09-11

## 2021-09-09 RX ORDER — ACETAMINOPHEN 500 MG
650 TABLET ORAL EVERY 6 HOURS
Refills: 0 | Status: DISCONTINUED | OUTPATIENT
Start: 2021-09-09 | End: 2021-09-11

## 2021-09-09 RX ORDER — MORPHINE SULFATE 50 MG/1
2 CAPSULE, EXTENDED RELEASE ORAL EVERY 4 HOURS
Refills: 0 | Status: DISCONTINUED | OUTPATIENT
Start: 2021-09-09 | End: 2021-09-11

## 2021-09-09 RX ORDER — METOPROLOL TARTRATE 50 MG
25 TABLET ORAL DAILY
Refills: 0 | Status: DISCONTINUED | OUTPATIENT
Start: 2021-09-09 | End: 2021-09-11

## 2021-09-09 RX ORDER — CEFTRIAXONE 500 MG/1
1000 INJECTION, POWDER, FOR SOLUTION INTRAMUSCULAR; INTRAVENOUS EVERY 24 HOURS
Refills: 0 | Status: DISCONTINUED | OUTPATIENT
Start: 2021-09-09 | End: 2021-09-11

## 2021-09-09 RX ORDER — SODIUM CHLORIDE 9 MG/ML
1000 INJECTION, SOLUTION INTRAVENOUS
Refills: 0 | Status: DISCONTINUED | OUTPATIENT
Start: 2021-09-09 | End: 2021-09-11

## 2021-09-09 RX ORDER — FOLIC ACID 0.8 MG
1 TABLET ORAL
Qty: 0 | Refills: 0 | DISCHARGE

## 2021-09-09 RX ADMIN — MORPHINE SULFATE 2 MILLIGRAM(S): 50 CAPSULE, EXTENDED RELEASE ORAL at 20:13

## 2021-09-09 RX ADMIN — MORPHINE SULFATE 2 MILLIGRAM(S): 50 CAPSULE, EXTENDED RELEASE ORAL at 19:43

## 2021-09-09 RX ADMIN — Medication 120 MILLIGRAM(S): at 21:33

## 2021-09-09 RX ADMIN — SIMVASTATIN 20 MILLIGRAM(S): 20 TABLET, FILM COATED ORAL at 21:33

## 2021-09-09 RX ADMIN — CEFTRIAXONE 100 MILLIGRAM(S): 500 INJECTION, POWDER, FOR SOLUTION INTRAMUSCULAR; INTRAVENOUS at 21:33

## 2021-09-09 NOTE — ED PROVIDER NOTE - CLINICAL SUMMARY MEDICAL DECISION MAKING FREE TEXT BOX
88 y/o F wth R hip pain s/p mechanical fall, on coumadin, a&0x2 hx denetmia, on huber for UI, plan= labs, urine, CT head neck hop to r/o fracture/ bleed, ekg and troponin to r/.o acs although unlikely as pt suffered mechanical fall dnies CP and idzzinesss

## 2021-09-09 NOTE — H&P ADULT - NSHPLABSRESULTS_GEN_ALL_CORE
Lab Results:  CBC  CBC Full  -  ( 09 Sep 2021 10:39 )  WBC Count : 6.01 K/uL  RBC Count : 4.43 M/uL  Hemoglobin : 13.9 g/dL  Hematocrit : 42.2 %  Platelet Count - Automated : 214 K/uL  Mean Cell Volume : 95.3 fl  Mean Cell Hemoglobin : 31.4 pg  Mean Cell Hemoglobin Concentration : 32.9 gm/dL  Auto Neutrophil # : 4.50 K/uL  Auto Lymphocyte # : 0.87 K/uL  Auto Monocyte # : 0.50 K/uL  Auto Eosinophil # : 0.05 K/uL  Auto Basophil # : 0.04 K/uL  Auto Neutrophil % : 74.9 %  Auto Lymphocyte % : 14.5 %  Auto Monocyte % : 8.3 %  Auto Eosinophil % : 0.8 %  Auto Basophil % : 0.7 %    .		Differential:	[] Automated		[] Manual  Chemistry                        13.9   6.01  )-----------( 214      ( 09 Sep 2021 10:39 )             42.2         141  |  106  |  22  ----------------------------<  95  4.5   |  28  |  1.10    Ca    8.9      09 Sep 2021 10:39  Mg     2.3         TPro  6.8  /  Alb  2.9<L>  /  TBili  0.7  /  DBili  x   /  AST  19  /  ALT  23  /  AlkPhos  49      LIVER FUNCTIONS - ( 09 Sep 2021 10:39 )  Alb: 2.9 g/dL / Pro: 6.8 g/dL / ALK PHOS: 49 U/L / ALT: 23 U/L / AST: 19 U/L / GGT: x           PT/INR - ( 09 Sep 2021 11:34 )   PT: 25.3 sec;   INR: 2.25 ratio         PTT - ( 09 Sep 2021 11:34 )  PTT:34.9 sec  Urinalysis Basic - ( 09 Sep 2021 11:49 )    Color: Yellow / Appearance: Clear / S.010 / pH: x  Gluc: x / Ketone: Negative  / Bili: Negative / Urobili: Negative   Blood: x / Protein: 15 / Nitrite: Negative   Leuk Esterase: Trace / RBC: x / WBC 6-10   Sq Epi: x / Non Sq Epi: Occasional / Bacteria: Occasional            MICROBIOLOGY/CULTURES:      RADIOLOGY RESULTS: reviewed

## 2021-09-09 NOTE — H&P ADULT - HISTORY OF PRESENT ILLNESS
87y Female who presented to the ED s/p fall at home as she was making her bed. She landed on her right side unwitnessed was helped up by her son in law. She denies any LOC or head trauma. Currently complains of right hip pain, declines any other injuries. Pt is a poor historian and confused at baseline. Has a past medical history of A fib on coumadin 10 mg daily, unsure if she got a dose today will confirm with daughter. Has a pacemaker, dementia, and osteopenia. Son in law states she has a UTI and was started on cipro 2 days ago and has not finished. Daughter is the Healthcare proxy and will be coming in the afternoon to discuss surgical intervention.

## 2021-09-09 NOTE — CHART NOTE - NSCHARTNOTEFT_GEN_A_CORE
Ortho PreOp Note    Dx: R FN Fx  Procedure: CRPP  Surgeon:                           13.9   6.01  )-----------( 214      ( 09 Sep 2021 10:39 )             42.2       09 Sep 2021 10:39    141    |  106    |  22     ----------------------------<  95     4.5     |  28     |  1.10     Ca    8.9        09 Sep 2021 10:39  Mg     2.3       09 Sep 2021 10:39    TPro  6.8    /  Alb  2.9    /  TBili  0.7    /  DBili  x      /  AST  19     /  ALT  23     /  AlkPhos  49     09 Sep 2021 10:39      PT/INR - ( 09 Sep 2021 11:34 )   PT: 25.3 sec;   INR: 2.25 ratio         PTT - ( 09 Sep 2021 11:34 )  PTT:34.9 sec    UA: Neg  T&S: Pend    EKG: Pend  CXR: Done  Clearance: Pending  Consent: Pending    Orders: NPO/IV fluids/Hold chemical anticoagulation after midnight                 2U PRBC on call to OR                 AM labs for preop

## 2021-09-09 NOTE — ED PROVIDER NOTE - PHYSICAL EXAMINATION
PE:   GEN: Awake, alert, confused, pleasant, interactive, NAD, non-toxic appearing.   HEAD: Atraumatic  EYES: Sclera white, conjunctiva pink, PERRLA  NOSE: Patent, no epistaxis  CARDIAC: Reg rate and rhythm, S1,S2, no murmur/rub/gallop. Strong central and peripheral pulses, Brisk cap refill, no evident pedal edema.   RESP: No distress noted. L/S clear = Bilat without accessory muscle use, wheeze, rhonchi, rales.   ABD: soft, supple, non-tender, no guarding. BS x 4, normoactive.   NEURO: AOx2, CN II-XII grossly intact without focal deficit.   MSK: Moving both upper extremities with no apparent deformities.  full ROM to LLE,  ROM to RLE 2/2 pain. TTP over R hip/ femur , pelvis stable, no c/t/l spine tenderness,   SKIN: Warm, dry,  without apparent rashes. ecchymosis to L upper arm/ right cheek

## 2021-09-09 NOTE — ED PROVIDER NOTE - ATTENDING CONTRIBUTION TO CARE
88 yo white female with A. Fib, ?? mechanical trip and fall short white ago, here via EMS c/o right hip pain. Exam revealed a pleasant white female in NAD with mild tenderness to palpation right hip. I agree with plan and management outlined by NP.

## 2021-09-09 NOTE — CONSULT NOTE ADULT - SUBJECTIVE AND OBJECTIVE BOX
Pt Name: SADIA CRUZ    MRN: [id number]    HPI: Patient is a 87y Female who presented to the ED s/p fall at home as she was making her bed. She landed on her right side unwitnessed was helped up by her son in law. She denies any LOC or head trauma. Currently complains of right hip pain, declines any other injuries. Pt is a poor historian and confused at baseline. Has a past medical history of A fib on coumadin 10 mg daily, unsure if she got a dose today will confirm with daughter. Has a pacemaker, dementia, and osteopenia. Son in law states she has a UTI and was started on cipro 2 days ago and has not finished. Daughter is the Healthcare proxy and will be coming in the afternoon to discuss surgical intervention.      PAST MEDICAL & SURGICAL HISTORY:  Osteopenia    High cholesterol    Atrial fibrillation        Allergies: No Known Allergies      Ambulation: Baseline Ambulation  With Walker                        13.9   6.01  )-----------( 214      ( 09 Sep 2021 10:39 )             42.2     09-09    141  |  106  |  22  ----------------------------<  95  4.5   |  28  |  1.10    Ca    8.9      09 Sep 2021 10:39  Mg     2.3     09-09    TPro  6.8  /  Alb  2.9<L>  /  TBili  0.7  /  DBili  x   /  AST  19  /  ALT  23  /  AlkPhos  49  09-09    PT/INR - ( 09 Sep 2021 11:34 )   PT: 25.3 sec;   INR: 2.25 ratio         PTT - ( 09 Sep 2021 11:34 )  PTT:34.9 sec      PHYSICAL EXAM:    Vital Signs Last 24 Hrs  T(C): 37 (09 Sep 2021 10:03), Max: 37 (09 Sep 2021 10:03)  T(F): 98.6 (09 Sep 2021 10:03), Max: 98.6 (09 Sep 2021 10:03)  HR: 71 (09 Sep 2021 10:03) (71 - 71)  BP: 100/68 (09 Sep 2021 10:03) (100/68 - 100/68)  BP(mean): --  RR: 18 (09 Sep 2021 10:03) (18 - 18)  SpO2: 94% (09 Sep 2021 10:03) (94% - 94%)    Physical Exam:  General: NAD, Alert, Awake and A+Ox1   Respiratory: Symmetric chest wall expansion bilaterally, no accessory muscle use    RIGHT UE: No open skin. No obvious deformities or other signs of trauma at shoulder, upper arm, elbow, forearm, wrist or hand.  Full baseline painless ROM at shoulder, elbow, wrist, and . No bony TTP.  2+ radial pulse with brisk cap refill at distal finger tips. Compartments soft and compressible. Strength 5/5.      LEFT UE: No open skin. No obvious deformities or other signs of trauma at shoulder, upper arm, elbow, forearm, wrist or hand.  Full baseline painless ROM at shoulder, elbow, wrist, and . No bony TTP. 2+ radial pulse with brisk cap refill at distal finger tips. Compartments soft and compressible. Strength 5/5.    HIPS and PELVIS: Pelvis stable to AP and Lat compression. Able to actively SLR bilaterally. Negative Log Roll, Negative Heel strike bilaterally. No pain on internal/external rotation of the hips.    RIGHT LE: No open skin noted. Tenderness to the right hip, positive log roll, ROM none due to hip pain. NT right knee, unable to access ROM due to hip pain. NT right ankle with FROM. good movement of the toes. 2+ DP/PT pulses with brisk cap refill distally. Compartments soft and compressible.      LEFT LE: No open skin. No deformities  or other signs of trauma at hip, thigh, knee, leg, ankle or foot.  Full baseline painless ROM at hip, knee, ankle and toe with negative log-roll and heel strike. Able to actively SLR.  1-5. No bony TTP to hip, knee or ankle. 2+ DP/PT pulses with brisk cap refill distally. Compartments soft and compressible. No pain on passive stretch. Strength 5/5.    Imaging:     xray right hip   minimally displaced right surgical neck fracture, will wait for official read       Orthopedic A/P:    Pt is a  87y Female with right surgical neck fracture requiring surgical intervention     - Plan for OR with   for right hip pinning     - Pain control PRN  -Medical management appreciated. Please document medical clearance for OR. Will need cardiology clearance due to history of A. Fib and pacemaker   -DVT PE ppx, SCDs  - follow up labs and INR  -N/V Checks to monitor for compartment signs  -Discussed with   who is aware and approves of plan.  Pt Name: SADIA CRUZ    MRN: [id number]    HPI: Patient is a 87y Female who presented to the ED s/p fall at home as she was making her bed. She landed on her right side unwitnessed was helped up by her son in law. She denies any LOC or head trauma. Currently complains of right hip pain, declines any other injuries. Pt is a poor historian and confused at baseline. Has a past medical history of A fib on coumadin 10 mg daily, unsure if she got a dose today will confirm with daughter. Has a pacemaker, dementia, and osteopenia. Son in law states she has a UTI and was started on cipro 2 days ago and has not finished. Daughter is the Healthcare proxy and will be coming in the afternoon to discuss surgical intervention.      PAST MEDICAL & SURGICAL HISTORY:  Osteopenia    High cholesterol    Atrial fibrillation        Allergies: No Known Allergies      Ambulation: Baseline Ambulation  With Walker                        13.9   6.01  )-----------( 214      ( 09 Sep 2021 10:39 )             42.2     09-09    141  |  106  |  22  ----------------------------<  95  4.5   |  28  |  1.10    Ca    8.9      09 Sep 2021 10:39  Mg     2.3     09-09    TPro  6.8  /  Alb  2.9<L>  /  TBili  0.7  /  DBili  x   /  AST  19  /  ALT  23  /  AlkPhos  49  09-09    PT/INR - ( 09 Sep 2021 11:34 )   PT: 25.3 sec;   INR: 2.25 ratio         PTT - ( 09 Sep 2021 11:34 )  PTT:34.9 sec      PHYSICAL EXAM:    Vital Signs Last 24 Hrs  T(C): 37 (09 Sep 2021 10:03), Max: 37 (09 Sep 2021 10:03)  T(F): 98.6 (09 Sep 2021 10:03), Max: 98.6 (09 Sep 2021 10:03)  HR: 71 (09 Sep 2021 10:03) (71 - 71)  BP: 100/68 (09 Sep 2021 10:03) (100/68 - 100/68)  BP(mean): --  RR: 18 (09 Sep 2021 10:03) (18 - 18)  SpO2: 94% (09 Sep 2021 10:03) (94% - 94%)    Physical Exam:  General: NAD, Alert, Awake and A+Ox1   Respiratory: Symmetric chest wall expansion bilaterally, no accessory muscle use    RIGHT UE: No open skin. No obvious deformities or other signs of trauma at shoulder, upper arm, elbow, forearm, wrist or hand.  Full baseline painless ROM at shoulder, elbow, wrist, and . No bony TTP.  2+ radial pulse with brisk cap refill at distal finger tips. Compartments soft and compressible. Strength 5/5.      LEFT UE: No open skin. No obvious deformities or other signs of trauma at shoulder, upper arm, elbow, forearm, wrist or hand.  Full baseline painless ROM at shoulder, elbow, wrist, and . No bony TTP. 2+ radial pulse with brisk cap refill at distal finger tips. Compartments soft and compressible. Strength 5/5.    HIPS and PELVIS: Pelvis stable to AP and Lat compression. Able to actively SLR bilaterally. Negative Log Roll, Negative Heel strike bilaterally. No pain on internal/external rotation of the hips.    RIGHT LE: No open skin noted. Tenderness to the right hip, positive log roll, ROM none due to hip pain. NT right knee, unable to access ROM due to hip pain. NT right ankle with FROM. good movement of the toes. 2+ DP/PT pulses with brisk cap refill distally. Compartments soft and compressible.      LEFT LE: No open skin. No deformities  or other signs of trauma at hip, thigh, knee, leg, ankle or foot.  Full baseline painless ROM at hip, knee, ankle and toe with negative log-roll and heel strike. Able to actively SLR.  1-5. No bony TTP to hip, knee or ankle. 2+ DP/PT pulses with brisk cap refill distally. Compartments soft and compressible. No pain on passive stretch. Strength 5/5.    Imaging:     xray right hip   minimally displaced right surgical neck fracture, will wait for official read       Orthopedic A/P:    Pt is a  87y Female with right surgical neck fracture requiring surgical intervention     - Plan for OR with Dr. Barraza for right hip pinning 9/10    - Pain control PRN  -Medical management appreciated. Please document medical clearance for OR. Will need cardiology clearance due to history of A. Fib and pacemaker   -Hold chem DVT ppx after midnight  - follow up labs and INR  -N/V Checks to monitor for compartment signs  -Cardiac Pacemaker, will need interrogation  -Needs Medical optimization/clearance, please document  -Discussed with   who is aware and approves of plan.

## 2021-09-09 NOTE — H&P ADULT - NSHPPHYSICALEXAM_GEN_ALL_CORE
General: WN/WD NAD  PERRLA  Neurology: A&Ox3, nonfocal, WILLAMS x 4  Respiratory: CTA B/L  CV: RRR, S1S2, no murmurs, rubs or gallops  Abdominal: Soft, NT, ND +BS, Last BM  Extremities: No edema, + peripheral pulses  Skin Normal

## 2021-09-09 NOTE — H&P ADULT - ASSESSMENT
87y Female who presented to the ED s/p fall at home as she was making her bed. She landed on her right side unwitnessed was helped up by her son in law. She denies any LOC or head trauma. Currently complains of right hip pain, declines any other injuries. Pt is a poor historian and confused at baseline. Has a past medical history of A fib on coumadin 10 mg daily, unsure if she got a dose today will confirm with daughter. Has a pacemaker, dementia, and osteopenia. Son in law states she has a UTI and was started on cipro 2 days ago and has not finished. Daughter is the Healthcare proxy and will be coming in the afternoon to discuss surgical intervention.     1 Femur fracture  - OR in am  - pain control  - ortho fu  - will monitor     2 Afib  - hold AC for OR  - cards fu  - PPM interogation    3 UTI  - sp cipro for 2 days  - cw ceftriaxone  - fu cultures    case dw ortho and cards

## 2021-09-09 NOTE — ED PROVIDER NOTE - PROGRESS NOTE DETAILS
R Femoral neck fracture noted on CT, call placed to ortho. Discussed case with [facility] Resident Ortho at bedside, pt and son in law who is now at bedside aware of all results and need for admission. As per ortho patient to be admitted to medicine and OR tomorrow. Case discussed with Dr Hancock who accepts pt for admisison

## 2021-09-09 NOTE — ED PROVIDER NOTE - OBJECTIVE STATEMENT
88 y/o F with PMH a fib, osteopenia, dementia, PPM presents to ED BIBEMS s/p fall for c/o R hip pain. Pt is confused at baseline and is a poor historian. Called daughter Isi who pt lives with who states pt tripped and fell when making her bed. Did not hit head, no LOC, pt in on coumadin 10mg daily. Fell onto R side, now with R hip pain.  Daughter states pt is on Cipro x 2 days for UTI.  Patient denies any other pain\, no CP or SB, no neck or back pain. Denies cp and dizziness     PCP Dr Jarrell  Cardi Dr Briceno

## 2021-09-10 ENCOUNTER — TRANSCRIPTION ENCOUNTER (OUTPATIENT)
Age: 86
End: 2021-09-10

## 2021-09-10 LAB
ALBUMIN SERPL ELPH-MCNC: 3 G/DL — LOW (ref 3.3–5)
ALP SERPL-CCNC: 59 U/L — SIGNIFICANT CHANGE UP (ref 40–120)
ALT FLD-CCNC: 22 U/L — SIGNIFICANT CHANGE UP (ref 12–78)
ANION GAP SERPL CALC-SCNC: 9 MMOL/L — SIGNIFICANT CHANGE UP (ref 5–17)
APPEARANCE UR: CLEAR — SIGNIFICANT CHANGE UP
APTT BLD: 35.1 SEC — SIGNIFICANT CHANGE UP (ref 27.5–35.5)
AST SERPL-CCNC: 19 U/L — SIGNIFICANT CHANGE UP (ref 15–37)
BASOPHILS # BLD AUTO: 0.04 K/UL — SIGNIFICANT CHANGE UP (ref 0–0.2)
BASOPHILS NFR BLD AUTO: 0.5 % — SIGNIFICANT CHANGE UP (ref 0–2)
BILIRUB SERPL-MCNC: 1.2 MG/DL — SIGNIFICANT CHANGE UP (ref 0.2–1.2)
BILIRUB UR-MCNC: NEGATIVE — SIGNIFICANT CHANGE UP
BUN SERPL-MCNC: 13 MG/DL — SIGNIFICANT CHANGE UP (ref 7–23)
CALCIUM SERPL-MCNC: 8.7 MG/DL — SIGNIFICANT CHANGE UP (ref 8.5–10.1)
CHLORIDE SERPL-SCNC: 102 MMOL/L — SIGNIFICANT CHANGE UP (ref 96–108)
CO2 SERPL-SCNC: 26 MMOL/L — SIGNIFICANT CHANGE UP (ref 22–31)
COLOR SPEC: YELLOW — SIGNIFICANT CHANGE UP
COVID-19 SPIKE DOMAIN AB INTERP: POSITIVE
COVID-19 SPIKE DOMAIN ANTIBODY RESULT: 79.6 U/ML — HIGH
CREAT SERPL-MCNC: 0.68 MG/DL — SIGNIFICANT CHANGE UP (ref 0.5–1.3)
CULTURE RESULTS: SIGNIFICANT CHANGE UP
DIFF PNL FLD: NEGATIVE — SIGNIFICANT CHANGE UP
EOSINOPHIL # BLD AUTO: 0.01 K/UL — SIGNIFICANT CHANGE UP (ref 0–0.5)
EOSINOPHIL NFR BLD AUTO: 0.1 % — SIGNIFICANT CHANGE UP (ref 0–6)
GLUCOSE SERPL-MCNC: 110 MG/DL — HIGH (ref 70–99)
GLUCOSE UR QL: NEGATIVE — SIGNIFICANT CHANGE UP
HCT VFR BLD CALC: 44.4 % — SIGNIFICANT CHANGE UP (ref 34.5–45)
HGB BLD-MCNC: 15 G/DL — SIGNIFICANT CHANGE UP (ref 11.5–15.5)
IMM GRANULOCYTES NFR BLD AUTO: 0.8 % — SIGNIFICANT CHANGE UP (ref 0–1.5)
INR BLD: 1.7 RATIO — HIGH (ref 0.88–1.16)
INR BLD: 1.93 RATIO — HIGH (ref 0.88–1.16)
INR BLD: 2.03 RATIO — HIGH (ref 0.88–1.16)
KETONES UR-MCNC: ABNORMAL
LEUKOCYTE ESTERASE UR-ACNC: NEGATIVE — SIGNIFICANT CHANGE UP
LYMPHOCYTES # BLD AUTO: 0.88 K/UL — LOW (ref 1–3.3)
LYMPHOCYTES # BLD AUTO: 10.3 % — LOW (ref 13–44)
MCHC RBC-ENTMCNC: 31.8 PG — SIGNIFICANT CHANGE UP (ref 27–34)
MCHC RBC-ENTMCNC: 33.8 GM/DL — SIGNIFICANT CHANGE UP (ref 32–36)
MCV RBC AUTO: 94.3 FL — SIGNIFICANT CHANGE UP (ref 80–100)
MONOCYTES # BLD AUTO: 0.79 K/UL — SIGNIFICANT CHANGE UP (ref 0–0.9)
MONOCYTES NFR BLD AUTO: 9.3 % — SIGNIFICANT CHANGE UP (ref 2–14)
NEUTROPHILS # BLD AUTO: 6.73 K/UL — SIGNIFICANT CHANGE UP (ref 1.8–7.4)
NEUTROPHILS NFR BLD AUTO: 79 % — HIGH (ref 43–77)
NITRITE UR-MCNC: NEGATIVE — SIGNIFICANT CHANGE UP
NRBC # BLD: 0 /100 WBCS — SIGNIFICANT CHANGE UP (ref 0–0)
PH UR: 7 — SIGNIFICANT CHANGE UP (ref 5–8)
PLATELET # BLD AUTO: 212 K/UL — SIGNIFICANT CHANGE UP (ref 150–400)
POTASSIUM SERPL-MCNC: 3.9 MMOL/L — SIGNIFICANT CHANGE UP (ref 3.5–5.3)
POTASSIUM SERPL-SCNC: 3.9 MMOL/L — SIGNIFICANT CHANGE UP (ref 3.5–5.3)
PROT SERPL-MCNC: 7.3 G/DL — SIGNIFICANT CHANGE UP (ref 6–8.3)
PROT UR-MCNC: 15
PROTHROM AB SERPL-ACNC: 19.4 SEC — HIGH (ref 10.6–13.6)
PROTHROM AB SERPL-ACNC: 21.9 SEC — HIGH (ref 10.6–13.6)
PROTHROM AB SERPL-ACNC: 23 SEC — HIGH (ref 10.6–13.6)
RBC # BLD: 4.71 M/UL — SIGNIFICANT CHANGE UP (ref 3.8–5.2)
RBC # FLD: 13.9 % — SIGNIFICANT CHANGE UP (ref 10.3–14.5)
SARS-COV-2 IGG+IGM SERPL QL IA: 79.6 U/ML — HIGH
SARS-COV-2 IGG+IGM SERPL QL IA: POSITIVE
SODIUM SERPL-SCNC: 137 MMOL/L — SIGNIFICANT CHANGE UP (ref 135–145)
SP GR SPEC: 1.01 — SIGNIFICANT CHANGE UP (ref 1.01–1.02)
SPECIMEN SOURCE: SIGNIFICANT CHANGE UP
UROBILINOGEN FLD QL: NEGATIVE — SIGNIFICANT CHANGE UP
WBC # BLD: 8.52 K/UL — SIGNIFICANT CHANGE UP (ref 3.8–10.5)
WBC # FLD AUTO: 8.52 K/UL — SIGNIFICANT CHANGE UP (ref 3.8–10.5)

## 2021-09-10 RX ORDER — PHYTONADIONE (VIT K1) 5 MG
5 TABLET ORAL ONCE
Refills: 0 | Status: COMPLETED | OUTPATIENT
Start: 2021-09-10 | End: 2021-09-10

## 2021-09-10 RX ORDER — ATORVASTATIN CALCIUM 80 MG/1
10 TABLET, FILM COATED ORAL AT BEDTIME
Refills: 0 | Status: DISCONTINUED | OUTPATIENT
Start: 2021-09-10 | End: 2021-09-11

## 2021-09-10 RX ADMIN — SODIUM CHLORIDE 75 MILLILITER(S): 9 INJECTION, SOLUTION INTRAVENOUS at 23:41

## 2021-09-10 RX ADMIN — Medication 240 MILLIGRAM(S): at 05:25

## 2021-09-10 RX ADMIN — Medication 25 MILLIGRAM(S): at 05:25

## 2021-09-10 RX ADMIN — CEFTRIAXONE 100 MILLIGRAM(S): 500 INJECTION, POWDER, FOR SOLUTION INTRAMUSCULAR; INTRAVENOUS at 21:05

## 2021-09-10 RX ADMIN — MORPHINE SULFATE 2 MILLIGRAM(S): 50 CAPSULE, EXTENDED RELEASE ORAL at 09:23

## 2021-09-10 RX ADMIN — Medication 20 MILLIGRAM(S): at 11:21

## 2021-09-10 RX ADMIN — SODIUM CHLORIDE 75 MILLILITER(S): 9 INJECTION, SOLUTION INTRAVENOUS at 01:18

## 2021-09-10 RX ADMIN — Medication 5 MILLIGRAM(S): at 10:09

## 2021-09-10 RX ADMIN — MORPHINE SULFATE 2 MILLIGRAM(S): 50 CAPSULE, EXTENDED RELEASE ORAL at 20:54

## 2021-09-10 RX ADMIN — MEMANTINE HYDROCHLORIDE 10 MILLIGRAM(S): 10 TABLET ORAL at 11:21

## 2021-09-10 RX ADMIN — Medication 120 MILLIGRAM(S): at 21:07

## 2021-09-10 RX ADMIN — MORPHINE SULFATE 2 MILLIGRAM(S): 50 CAPSULE, EXTENDED RELEASE ORAL at 20:35

## 2021-09-10 RX ADMIN — SODIUM CHLORIDE 75 MILLILITER(S): 9 INJECTION, SOLUTION INTRAVENOUS at 11:30

## 2021-09-10 RX ADMIN — ATORVASTATIN CALCIUM 10 MILLIGRAM(S): 80 TABLET, FILM COATED ORAL at 21:07

## 2021-09-10 NOTE — CONSULT NOTE ADULT - SUBJECTIVE AND OBJECTIVE BOX
[facility] Cardiology    CHIEF COMPLAINT: Patient is a 87y old  Female who presents with a chief complaint of fall (10 Sep 2021 07:40)      HPI:  87y Female who presented to the ED s/p fall at home as she was making her bed. She landed on her right side unwitnessed was helped up by her son in law. She denies any LOC or head trauma. Currently complains of right hip pain, declines any other injuries. Pt is a poor historian and confused at baseline. Has a past medical history of A fib on coumadin 10 mg daily, unsure if she got a dose today will confirm with daughter. Has a pacemaker, dementia, and osteopenia. Son in law states she has a UTI and was started on cipro 2 days ago and has not finished. Daughter is the Healthcare proxy and will be coming in the afternoon to discuss surgical intervention.  (09 Sep 2021 18:18)    PAST MEDICAL & SURGICAL HISTORY:  Osteopenia    High cholesterol    Atrial fibrillation    No significant past surgical history      SOCIAL HISTORY: no tobacco  FAMILY HISTORY:  No pertinent family history in first degree relatives     HTN  MEDICATIONS  (STANDING):  cefTRIAXone   IVPB 1000 milliGRAM(s) IV Intermittent every 24 hours  diltiazem    milliGRAM(s) Oral daily  diltiazem    milliGRAM(s) Oral at bedtime  influenza   Vaccine 0.5 milliLiter(s) IntraMuscular once  lactated ringers. 1000 milliLiter(s) (75 mL/Hr) IV Continuous <Continuous>  memantine 10 milliGRAM(s) Oral daily  metoprolol succinate ER 25 milliGRAM(s) Oral daily  PARoxetine 20 milliGRAM(s) Oral daily  simvastatin 20 milliGRAM(s) Oral at bedtime    MEDICATIONS  (PRN):  acetaminophen   Tablet .. 650 milliGRAM(s) Oral every 6 hours PRN Temp greater or equal to 38C (100.4F), Mild Pain (1 - 3)  morphine  - Injectable 2 milliGRAM(s) IV Push every 4 hours PRN Severe Pain (7 - 10)  oxycodone    5 mG/acetaminophen 325 mG 1 Tablet(s) Oral every 6 hours PRN Moderate Pain (4 - 6)    Allergies    No Known Allergies    Intolerances        REVIEW OF SYSTEMS:  CONSTITUTIONAL: No weakness, no fevers   EYES/ENT: No visual changes  NECK: No pain or stiffness  RESPIRATORY: No shortness of breath  CARDIOVASCULAR: No chest pain or palpitations  GASTROINTESTINAL: No abdominal pain  GENITOURINARY: No hematuria  NEUROLOGICAL: No weakness  SKIN: No rash  All other review of systems is negative unless indicated above    VITAL SIGNS:   Vital Signs Last 24 Hrs  T(C): 37.2 (10 Sep 2021 04:23), Max: 37.3 (09 Sep 2021 19:49)  T(F): 98.9 (10 Sep 2021 04:23), Max: 99.1 (09 Sep 2021 19:49)  HR: 78 (10 Sep 2021 04:23) (75 - 82)  BP: 150/89 (10 Sep 2021 04:23) (150/85 - 157/82)  BP(mean): --  RR: 17 (10 Sep 2021 04:23) (16 - 17)  SpO2: 94% (10 Sep 2021 04:23) (94% - 97%)  I&O's Summary    09 Sep 2021 07:01  -  10 Sep 2021 07:00  --------------------------------------------------------  IN: 0 mL / OUT: 300 mL / NET: -300 mL      PHYSICAL EXAM:  Constitutional: NAD  Neurological: Alert and oriented  HEENT: EOMI, no JVD  Cardiovascular: S1 and S2, no murmur  Pulmonary: breath sounds bilaterally  Gastrointestinal: Bowel Sounds present, soft, nontender  Ext: no peripheral edema  Skin: No rashes, No cyanosis.  Psych:  Mood calm    LABS: All Labs Reviewed:                        15.0   8.52  )-----------( 212      ( 10 Sep 2021 07:14 )             44.4     09-10    137  |  102  |  13  ----------------------------<  110<H>  3.9   |  26  |  0.68    Ca    8.7      10 Sep 2021 07:14  Mg     2.3     09-09    TPro  7.3  /  Alb  3.0<L>  /  TBili  1.2  /  DBili  x   /  AST  19  /  ALT  22  /  AlkPhos  59  09-10    PT/INR - ( 10 Sep 2021 07:14 )   PT: 23.0 sec;   INR: 2.03 ratio         PTT - ( 10 Sep 2021 07:14 )  PTT:35.1 sec  CARDIAC MARKERS ( 09 Sep 2021 10:39 )  <.015 ng/mL / x     / x     / x     / x          CT Head No Cont:   EXAM:  CT CERVICAL SPINE                          EXAM:  CT BRAIN                            PROCEDURE DATE:  09/09/2021          INTERPRETATION:  CLINICAL STATEMENT: Trauma..    TECHNIQUE: CT of the head and cervical spine were performed without IV contrast. 3-D/MIP images obtained    COMPARISON: CT head and cervical spine 2/20/2021    FINDINGS:  Head:  There is mild diffuse parenchymal volume loss. There are areas of low attenuation in the periventricular white matter likely related to mild chronic microvascular ischemic changes.    There is no acute intracranial hemorrhage, parenchymal mass, mass effect or midline shift. There is no acute territorial infarct. There is no hydrocephalus. Partial empty sella    The cranium is intact.    Mucosal thickening left aspect of frontal sinus.    Cervical spine:  The vertebral body heights and alignment are maintained. There is no acute fracture.    There is no prevertebral soft tissue swelling. The odontoid is intact.    Evaluation of the spinal canal is limited on a CT exam.  Multilevel degenerative changes noted resulting in multilevel spinal canal stenosis and neural foraminal narrowing.    IMPRESSION:  No acute intracranial hemorrhage    No acute fracture cervical spine. If pain persists, follow-up MRI exam recommended    --- End of Report ---      87y Female who presented to the ED s/p fall at home as she was making her bed. She landed on her right side unwitnessed was helped up by her son in law. She denies any LOC or head trauma. Currently complains of right hip pain, declines any other injuries. Pt is a poor historian and confused at baseline. Has a past medical history of A fib on coumadin 10 mg daily, unsure if she got a dose today will confirm with daughter. Has a pacemaker, dementia, and osteopenia. Son in law states she has a UTI and was started on cipro 2 days ago and has not finished. Daughter is the Healthcare proxy and will be coming in the afternoon to discuss surgical intervention.  (09 Sep 2021 18:18)    HIP fx  Pt denies CP or SOB  EKG paced with AF  PPM interrogation in chart, no events last 90 days. battery>2.5 years  Hold coumadin  Cont metoprolol, diltiazem, statin  No cardiac contraindication to O.R.  pt sees my partner Dr Delong, EP, [phone]  Will follow here        CORA RODRIGEZ MD; Attending Radiologist  This document has been electronically signed. Sep  9 2021 12:06PM (09-09-21 @ 11:18)

## 2021-09-10 NOTE — PROGRESS NOTE ADULT - SUBJECTIVE AND OBJECTIVE BOX
Patient is a 87y old  Female who presents with a chief complaint of fall (10 Sep 2021 06:34)    Date of servie : 09-10-21 @ 07:41  INTERVAL HPI/OVERNIGHT EVENTS:  T(C): 37.2 (09-10-21 @ 04:23), Max: 37.3 (21 @ 19:49)  HR: 78 (09-10-21 @ 04:23) (71 - 82)  BP: 150/89 (09-10-21 @ 04:23) (100/68 - 157/82)  RR: 17 (09-10-21 @ 04:23) (16 - 18)  SpO2: 94% (09-10-21 @ 04:23) (94% - 97%)  Wt(kg): --  I&O's Summary    09 Sep 2021 07:01  -  10 Sep 2021 07:00  --------------------------------------------------------  IN: 0 mL / OUT: 300 mL / NET: -300 mL        LABS:                        15.0   8.52  )-----------( 212      ( 10 Sep 2021 07:14 )             44.4         141  |  106  |  22  ----------------------------<  95  4.5   |  28  |  1.10    Ca    8.9      09 Sep 2021 10:39  Mg     2.3         TPro  6.8  /  Alb  2.9<L>  /  TBili  0.7  /  DBili  x   /  AST  19  /  ALT  23  /  AlkPhos  49  09-    PT/INR - ( 10 Sep 2021 07:14 )   PT: 23.0 sec;   INR: 2.03 ratio         PTT - ( 10 Sep 2021 07:14 )  PTT:35.1 sec  Urinalysis Basic - ( 09 Sep 2021 11:49 )    Color: Yellow / Appearance: Clear / S.010 / pH: x  Gluc: x / Ketone: Negative  / Bili: Negative / Urobili: Negative   Blood: x / Protein: 15 / Nitrite: Negative   Leuk Esterase: Trace / RBC: x / WBC 6-10   Sq Epi: x / Non Sq Epi: Occasional / Bacteria: Occasional      CAPILLARY BLOOD GLUCOSE            Urinalysis Basic - ( 09 Sep 2021 11:49 )    Color: Yellow / Appearance: Clear / S.010 / pH: x  Gluc: x / Ketone: Negative  / Bili: Negative / Urobili: Negative   Blood: x / Protein: 15 / Nitrite: Negative   Leuk Esterase: Trace / RBC: x / WBC 6-10   Sq Epi: x / Non Sq Epi: Occasional / Bacteria: Occasional        MEDICATIONS  (STANDING):  cefTRIAXone   IVPB 1000 milliGRAM(s) IV Intermittent every 24 hours  diltiazem    milliGRAM(s) Oral daily  diltiazem    milliGRAM(s) Oral at bedtime  influenza   Vaccine 0.5 milliLiter(s) IntraMuscular once  lactated ringers. 1000 milliLiter(s) (75 mL/Hr) IV Continuous <Continuous>  memantine 10 milliGRAM(s) Oral daily  metoprolol succinate ER 25 milliGRAM(s) Oral daily  PARoxetine 20 milliGRAM(s) Oral daily  simvastatin 20 milliGRAM(s) Oral at bedtime    MEDICATIONS  (PRN):  acetaminophen   Tablet .. 650 milliGRAM(s) Oral every 6 hours PRN Temp greater or equal to 38C (100.4F), Mild Pain (1 - 3)  morphine  - Injectable 2 milliGRAM(s) IV Push every 4 hours PRN Severe Pain (7 - 10)  oxycodone    5 mG/acetaminophen 325 mG 1 Tablet(s) Oral every 6 hours PRN Moderate Pain (4 - 6)          PHYSICAL EXAM:  GENERAL: NAD, well-groomed, well-developed  HEAD:  Atraumatic, Normocephalic  CHEST/LUNG: Clear to percussion bilaterally; No rales, rhonchi, wheezing, or rubs  HEART: Regular rate and rhythm; No murmurs, rubs, or gallops  ABDOMEN: Soft, Nontender, Nondistended; Bowel sounds present  EXTREMITIES:  2+ Peripheral Pulses, No clubbing, cyanosis, or edema  LYMPH: No lymphadenopathy noted  SKIN: No rashes or lesions    Care Discussed with Consultants/Other Providers [ ] YES  [ ] NO

## 2021-09-10 NOTE — PROGRESS NOTE ADULT - SUBJECTIVE AND OBJECTIVE BOX
HPI: Patient is a 87y Female with R FN Fx. Patient is in minimal pain overnight. Denies numbness, weakness, CP/SOB/N/V. No other complaints at this time. Baseline dementia.    PAST MEDICAL & SURGICAL HISTORY:  Osteopenia    High cholesterol    Atrial fibrillation        Allergies: No Known Allergies    LABS:                        13.9   6.01  )-----------( 214      ( 09 Sep 2021 10:39 )             42.2         141  |  106  |  22  ----------------------------<  95  4.5   |  28  |  1.10    Ca    8.9      09 Sep 2021 10:39  Mg     2.3         TPro  6.8  /  Alb  2.9<L>  /  TBili  0.7  /  DBili  x   /  AST  19  /  ALT  23  /  AlkPhos  49      PT/INR - ( 09 Sep 2021 11:34 )   PT: 25.3 sec;   INR: 2.25 ratio         PTT - ( 09 Sep 2021 11:34 )  PTT:34.9 sec  Urinalysis Basic - ( 09 Sep 2021 11:49 )    Color: Yellow / Appearance: Clear / S.010 / pH: x  Gluc: x / Ketone: Negative  / Bili: Negative / Urobili: Negative   Blood: x / Protein: 15 / Nitrite: Negative   Leuk Esterase: Trace / RBC: x / WBC 6-10   Sq Epi: x / Non Sq Epi: Occasional / Bacteria: Occasional        VITAL SIGNS:  T(C): 37.2 (09-10-21 @ 04:23), Max: 37.3 (21 @ 19:49)  HR: 78 (09-10-21 @ 04:23) (71 - 82)  BP: 150/89 (09-10-21 @ 04:23) (100/68 - 157/82)  RR: 17 (09-10-21 @ 04:23) (16 - 18)  SpO2: 94% (09-10-21 @ 04:23) (94% - 97%)      Physical Exam:  General: NAD, Alert, Awake and AOx1     RLE: Skin over R hip intact.  Tenderness to the right hip  SILT L2-S1  +EHL, +TA, +GS  +DP Pulse    Imaging:     xray right hip   minimally displaced right surgical neck fracture      Orthopedic A/P:    Pt is a  87y Female with right surgical neck fracture requiring surgical intervention     - Plan for OR with Dr. Barraza for right hip pinning 9/10    - Pain control PRN  -Medical management appreciated. Please document medical clearance for OR. Will need cardiology clearance due to history of A. Fib and pacemaker   -Hold chem DVT ppx after midnight  - follow up labs and INR  -Cardiac Pacemaker, [employer], will need interrogation  -Needs Medical optimization/clearance, please document  -Discussed with Dr. Barraza who is aware and approves of plan.

## 2021-09-10 NOTE — PROGRESS NOTE ADULT - ASSESSMENT
87y Female who presented to the ED s/p fall at home as she was making her bed. She landed on her right side unwitnessed was helped up by her son in law. She denies any LOC or head trauma. Currently complains of right hip pain, declines any other injuries. Pt is a poor historian and confused at baseline. Has a past medical history of A fib on coumadin 10 mg daily, unsure if she got a dose today will confirm with daughter. Has a pacemaker, dementia, and osteopenia. Son in law states she has a UTI and was started on cipro 2 days ago and has not finished. Daughter is the Healthcare proxy and will be coming in the afternoon to discuss surgical intervention.     1 Femur fracture  - OR in am  - pain control  - ortho fu  - will monitor     2 Afib  - hold AC for OR  - cards fu  - PPM interogation    3 UTI  - sp cipro for 2 days  - cw ceftriaxone  - fu cultures    Pt medically optimized for OR pending cardiac clearance

## 2021-09-11 ENCOUNTER — TRANSCRIPTION ENCOUNTER (OUTPATIENT)
Age: 86
End: 2021-09-11

## 2021-09-11 DIAGNOSIS — S72.001A FRACTURE OF UNSPECIFIED PART OF NECK OF RIGHT FEMUR, INITIAL ENCOUNTER FOR CLOSED FRACTURE: ICD-10-CM

## 2021-09-11 LAB
ALBUMIN SERPL ELPH-MCNC: 2.8 G/DL — LOW (ref 3.3–5)
ALP SERPL-CCNC: 53 U/L — SIGNIFICANT CHANGE UP (ref 40–120)
ALT FLD-CCNC: 23 U/L — SIGNIFICANT CHANGE UP (ref 12–78)
ANION GAP SERPL CALC-SCNC: 6 MMOL/L — SIGNIFICANT CHANGE UP (ref 5–17)
ANION GAP SERPL CALC-SCNC: 9 MMOL/L — SIGNIFICANT CHANGE UP (ref 5–17)
APTT BLD: 26.7 SEC — LOW (ref 27.5–35.5)
AST SERPL-CCNC: 37 U/L — SIGNIFICANT CHANGE UP (ref 15–37)
BILIRUB SERPL-MCNC: 1.5 MG/DL — HIGH (ref 0.2–1.2)
BUN SERPL-MCNC: 13 MG/DL — SIGNIFICANT CHANGE UP (ref 7–23)
BUN SERPL-MCNC: 15 MG/DL — SIGNIFICANT CHANGE UP (ref 7–23)
CALCIUM SERPL-MCNC: 8.5 MG/DL — SIGNIFICANT CHANGE UP (ref 8.5–10.1)
CALCIUM SERPL-MCNC: 8.5 MG/DL — SIGNIFICANT CHANGE UP (ref 8.5–10.1)
CHLORIDE SERPL-SCNC: 102 MMOL/L — SIGNIFICANT CHANGE UP (ref 96–108)
CHLORIDE SERPL-SCNC: 103 MMOL/L — SIGNIFICANT CHANGE UP (ref 96–108)
CO2 SERPL-SCNC: 23 MMOL/L — SIGNIFICANT CHANGE UP (ref 22–31)
CO2 SERPL-SCNC: 25 MMOL/L — SIGNIFICANT CHANGE UP (ref 22–31)
CREAT SERPL-MCNC: 0.78 MG/DL — SIGNIFICANT CHANGE UP (ref 0.5–1.3)
CREAT SERPL-MCNC: 0.81 MG/DL — SIGNIFICANT CHANGE UP (ref 0.5–1.3)
CULTURE RESULTS: SIGNIFICANT CHANGE UP
GLUCOSE SERPL-MCNC: 114 MG/DL — HIGH (ref 70–99)
GLUCOSE SERPL-MCNC: 132 MG/DL — HIGH (ref 70–99)
HCT VFR BLD CALC: 42.7 % — SIGNIFICANT CHANGE UP (ref 34.5–45)
HCT VFR BLD CALC: 44.4 % — SIGNIFICANT CHANGE UP (ref 34.5–45)
HGB BLD-MCNC: 14.8 G/DL — SIGNIFICANT CHANGE UP (ref 11.5–15.5)
HGB BLD-MCNC: 15.1 G/DL — SIGNIFICANT CHANGE UP (ref 11.5–15.5)
INR BLD: 1.45 RATIO — HIGH (ref 0.88–1.16)
MCHC RBC-ENTMCNC: 31.7 PG — SIGNIFICANT CHANGE UP (ref 27–34)
MCHC RBC-ENTMCNC: 32.1 PG — SIGNIFICANT CHANGE UP (ref 27–34)
MCHC RBC-ENTMCNC: 34 GM/DL — SIGNIFICANT CHANGE UP (ref 32–36)
MCHC RBC-ENTMCNC: 34.7 GM/DL — SIGNIFICANT CHANGE UP (ref 32–36)
MCV RBC AUTO: 91.4 FL — SIGNIFICANT CHANGE UP (ref 80–100)
MCV RBC AUTO: 94.5 FL — SIGNIFICANT CHANGE UP (ref 80–100)
NRBC # BLD: 0 /100 WBCS — SIGNIFICANT CHANGE UP (ref 0–0)
NRBC # BLD: 0 /100 WBCS — SIGNIFICANT CHANGE UP (ref 0–0)
PLATELET # BLD AUTO: 212 K/UL — SIGNIFICANT CHANGE UP (ref 150–400)
PLATELET # BLD AUTO: 222 K/UL — SIGNIFICANT CHANGE UP (ref 150–400)
POTASSIUM SERPL-MCNC: 3.6 MMOL/L — SIGNIFICANT CHANGE UP (ref 3.5–5.3)
POTASSIUM SERPL-MCNC: 3.6 MMOL/L — SIGNIFICANT CHANGE UP (ref 3.5–5.3)
POTASSIUM SERPL-SCNC: 3.6 MMOL/L — SIGNIFICANT CHANGE UP (ref 3.5–5.3)
POTASSIUM SERPL-SCNC: 3.6 MMOL/L — SIGNIFICANT CHANGE UP (ref 3.5–5.3)
PROT SERPL-MCNC: 6.8 G/DL — SIGNIFICANT CHANGE UP (ref 6–8.3)
PROTHROM AB SERPL-ACNC: 16.7 SEC — HIGH (ref 10.6–13.6)
RBC # BLD: 4.67 M/UL — SIGNIFICANT CHANGE UP (ref 3.8–5.2)
RBC # BLD: 4.7 M/UL — SIGNIFICANT CHANGE UP (ref 3.8–5.2)
RBC # FLD: 13.7 % — SIGNIFICANT CHANGE UP (ref 10.3–14.5)
RBC # FLD: 13.9 % — SIGNIFICANT CHANGE UP (ref 10.3–14.5)
SODIUM SERPL-SCNC: 133 MMOL/L — LOW (ref 135–145)
SODIUM SERPL-SCNC: 135 MMOL/L — SIGNIFICANT CHANGE UP (ref 135–145)
SPECIMEN SOURCE: SIGNIFICANT CHANGE UP
WBC # BLD: 11.17 K/UL — HIGH (ref 3.8–10.5)
WBC # BLD: 12.37 K/UL — HIGH (ref 3.8–10.5)
WBC # FLD AUTO: 11.17 K/UL — HIGH (ref 3.8–10.5)
WBC # FLD AUTO: 12.37 K/UL — HIGH (ref 3.8–10.5)

## 2021-09-11 RX ORDER — HYDROMORPHONE HYDROCHLORIDE 2 MG/ML
0.5 INJECTION INTRAMUSCULAR; INTRAVENOUS; SUBCUTANEOUS
Refills: 0 | Status: DISCONTINUED | OUTPATIENT
Start: 2021-09-11 | End: 2021-09-11

## 2021-09-11 RX ORDER — CEFAZOLIN SODIUM 1 G
2000 VIAL (EA) INJECTION ONCE
Refills: 0 | Status: DISCONTINUED | OUTPATIENT
Start: 2021-09-11 | End: 2021-09-11

## 2021-09-11 RX ORDER — WARFARIN SODIUM 2.5 MG/1
10 TABLET ORAL ONCE
Refills: 0 | Status: DISCONTINUED | OUTPATIENT
Start: 2021-09-11 | End: 2021-09-11

## 2021-09-11 RX ORDER — SODIUM CHLORIDE 9 MG/ML
1000 INJECTION, SOLUTION INTRAVENOUS
Refills: 0 | Status: DISCONTINUED | OUTPATIENT
Start: 2021-09-11 | End: 2021-09-11

## 2021-09-11 RX ORDER — MEMANTINE HYDROCHLORIDE 10 MG/1
10 TABLET ORAL DAILY
Refills: 0 | Status: DISCONTINUED | OUTPATIENT
Start: 2021-09-11 | End: 2021-09-15

## 2021-09-11 RX ORDER — ONDANSETRON 8 MG/1
4 TABLET, FILM COATED ORAL ONCE
Refills: 0 | Status: DISCONTINUED | OUTPATIENT
Start: 2021-09-11 | End: 2021-09-11

## 2021-09-11 RX ORDER — OXYCODONE HYDROCHLORIDE 5 MG/1
5 TABLET ORAL EVERY 4 HOURS
Refills: 0 | Status: DISCONTINUED | OUTPATIENT
Start: 2021-09-11 | End: 2021-09-11

## 2021-09-11 RX ORDER — SENNA PLUS 8.6 MG/1
2 TABLET ORAL AT BEDTIME
Refills: 0 | Status: DISCONTINUED | OUTPATIENT
Start: 2021-09-11 | End: 2021-09-15

## 2021-09-11 RX ORDER — OXYCODONE HYDROCHLORIDE 5 MG/1
5 TABLET ORAL EVERY 4 HOURS
Refills: 0 | Status: DISCONTINUED | OUTPATIENT
Start: 2021-09-11 | End: 2021-09-15

## 2021-09-11 RX ORDER — TRAMADOL HYDROCHLORIDE 50 MG/1
50 TABLET ORAL EVERY 6 HOURS
Refills: 0 | Status: DISCONTINUED | OUTPATIENT
Start: 2021-09-11 | End: 2021-09-15

## 2021-09-11 RX ORDER — WARFARIN SODIUM 2.5 MG/1
2.5 TABLET ORAL ONCE
Refills: 0 | Status: COMPLETED | OUTPATIENT
Start: 2021-09-11 | End: 2021-09-11

## 2021-09-11 RX ORDER — LANOLIN ALCOHOL/MO/W.PET/CERES
3 CREAM (GRAM) TOPICAL AT BEDTIME
Refills: 0 | Status: DISCONTINUED | OUTPATIENT
Start: 2021-09-11 | End: 2021-09-11

## 2021-09-11 RX ORDER — SODIUM CHLORIDE 9 MG/ML
1000 INJECTION INTRAMUSCULAR; INTRAVENOUS; SUBCUTANEOUS
Refills: 0 | Status: DISCONTINUED | OUTPATIENT
Start: 2021-09-11 | End: 2021-09-11

## 2021-09-11 RX ORDER — POLYETHYLENE GLYCOL 3350 17 G/17G
17 POWDER, FOR SOLUTION ORAL DAILY
Refills: 0 | Status: DISCONTINUED | OUTPATIENT
Start: 2021-09-11 | End: 2021-09-11

## 2021-09-11 RX ORDER — OXYCODONE HYDROCHLORIDE 5 MG/1
2.5 TABLET ORAL EVERY 4 HOURS
Refills: 0 | Status: DISCONTINUED | OUTPATIENT
Start: 2021-09-11 | End: 2021-09-11

## 2021-09-11 RX ORDER — SENNA PLUS 8.6 MG/1
2 TABLET ORAL AT BEDTIME
Refills: 0 | Status: DISCONTINUED | OUTPATIENT
Start: 2021-09-11 | End: 2021-09-11

## 2021-09-11 RX ORDER — ATORVASTATIN CALCIUM 80 MG/1
10 TABLET, FILM COATED ORAL AT BEDTIME
Refills: 0 | Status: DISCONTINUED | OUTPATIENT
Start: 2021-09-11 | End: 2021-09-15

## 2021-09-11 RX ORDER — POLYETHYLENE GLYCOL 3350 17 G/17G
17 POWDER, FOR SOLUTION ORAL AT BEDTIME
Refills: 0 | Status: DISCONTINUED | OUTPATIENT
Start: 2021-09-11 | End: 2021-09-15

## 2021-09-11 RX ORDER — MAGNESIUM HYDROXIDE 400 MG/1
30 TABLET, CHEWABLE ORAL DAILY
Refills: 0 | Status: DISCONTINUED | OUTPATIENT
Start: 2021-09-11 | End: 2021-09-11

## 2021-09-11 RX ORDER — SODIUM CHLORIDE 9 MG/ML
1000 INJECTION INTRAMUSCULAR; INTRAVENOUS; SUBCUTANEOUS
Refills: 0 | Status: DISCONTINUED | OUTPATIENT
Start: 2021-09-11 | End: 2021-09-15

## 2021-09-11 RX ORDER — ACETAMINOPHEN 500 MG
650 TABLET ORAL EVERY 6 HOURS
Refills: 0 | Status: DISCONTINUED | OUTPATIENT
Start: 2021-09-11 | End: 2021-09-15

## 2021-09-11 RX ORDER — CEFAZOLIN SODIUM 1 G
2000 VIAL (EA) INJECTION EVERY 8 HOURS
Refills: 0 | Status: COMPLETED | OUTPATIENT
Start: 2021-09-11 | End: 2021-09-12

## 2021-09-11 RX ORDER — LANOLIN ALCOHOL/MO/W.PET/CERES
3 CREAM (GRAM) TOPICAL AT BEDTIME
Refills: 0 | Status: DISCONTINUED | OUTPATIENT
Start: 2021-09-11 | End: 2021-09-15

## 2021-09-11 RX ORDER — ONDANSETRON 8 MG/1
4 TABLET, FILM COATED ORAL EVERY 6 HOURS
Refills: 0 | Status: DISCONTINUED | OUTPATIENT
Start: 2021-09-11 | End: 2021-09-11

## 2021-09-11 RX ORDER — CEFAZOLIN SODIUM 1 G
2000 VIAL (EA) INJECTION EVERY 8 HOURS
Refills: 0 | Status: DISCONTINUED | OUTPATIENT
Start: 2021-09-11 | End: 2021-09-11

## 2021-09-11 RX ORDER — OXYCODONE HYDROCHLORIDE 5 MG/1
2.5 TABLET ORAL EVERY 4 HOURS
Refills: 0 | Status: DISCONTINUED | OUTPATIENT
Start: 2021-09-11 | End: 2021-09-15

## 2021-09-11 RX ORDER — DILTIAZEM HCL 120 MG
120 CAPSULE, EXT RELEASE 24 HR ORAL AT BEDTIME
Refills: 0 | Status: DISCONTINUED | OUTPATIENT
Start: 2021-09-11 | End: 2021-09-15

## 2021-09-11 RX ORDER — METOPROLOL TARTRATE 50 MG
25 TABLET ORAL DAILY
Refills: 0 | Status: DISCONTINUED | OUTPATIENT
Start: 2021-09-11 | End: 2021-09-15

## 2021-09-11 RX ORDER — DILTIAZEM HCL 120 MG
240 CAPSULE, EXT RELEASE 24 HR ORAL DAILY
Refills: 0 | Status: DISCONTINUED | OUTPATIENT
Start: 2021-09-12 | End: 2021-09-15

## 2021-09-11 RX ADMIN — Medication 650 MILLIGRAM(S): at 18:06

## 2021-09-11 RX ADMIN — OXYCODONE AND ACETAMINOPHEN 1 TABLET(S): 5; 325 TABLET ORAL at 06:36

## 2021-09-11 RX ADMIN — OXYCODONE AND ACETAMINOPHEN 1 TABLET(S): 5; 325 TABLET ORAL at 05:36

## 2021-09-11 RX ADMIN — Medication 650 MILLIGRAM(S): at 17:06

## 2021-09-11 RX ADMIN — Medication 240 MILLIGRAM(S): at 05:29

## 2021-09-11 RX ADMIN — Medication 25 MILLIGRAM(S): at 05:29

## 2021-09-11 RX ADMIN — SODIUM CHLORIDE 100 MILLILITER(S): 9 INJECTION INTRAMUSCULAR; INTRAVENOUS; SUBCUTANEOUS at 17:06

## 2021-09-11 RX ADMIN — WARFARIN SODIUM 2.5 MILLIGRAM(S): 2.5 TABLET ORAL at 21:08

## 2021-09-11 RX ADMIN — ATORVASTATIN CALCIUM 10 MILLIGRAM(S): 80 TABLET, FILM COATED ORAL at 21:08

## 2021-09-11 RX ADMIN — Medication 100 MILLIGRAM(S): at 19:25

## 2021-09-11 RX ADMIN — SODIUM CHLORIDE 75 MILLILITER(S): 9 INJECTION, SOLUTION INTRAVENOUS at 13:35

## 2021-09-11 NOTE — DISCHARGE NOTE PROVIDER - NSDCCPCAREPLAN_GEN_ALL_CORE_FT
PRINCIPAL DISCHARGE DIAGNOSIS  Diagnosis: Fracture of femoral neck, right, closed  Assessment and Plan of Treatment:

## 2021-09-11 NOTE — PROGRESS NOTE ADULT - SUBJECTIVE AND OBJECTIVE BOX
[facility]    CHIEF COMPLAINT: Patient is a 87y old  Female who presents with a chief complaint of fall (11 Sep 2021 07:18)      Follow Up: [ ] Chest Pain      [ ] Dyspnea     [ ] Palpitations    [ ] Atrial Fibrillation     [ ] Ventricular Dysrhythmia    [ ] Abnormal EKG                      [ ] Abnormal Cardiac Enzymes     [ ] Valvular Disease    HPI:  87y Female who presented to the ED s/p fall at home as she was making her bed. She landed on her right side unwitnessed was helped up by her son in law. She denies any LOC or head trauma. Currently complains of right hip pain, declines any other injuries. Pt is a poor historian and confused at baseline. Has a past medical history of A fib on coumadin 10 mg daily, unsure if she got a dose today will confirm with daughter. Has a pacemaker, dementia, and osteopenia. Son in law states she has a UTI and was started on cipro 2 days ago and has not finished. Daughter is the Healthcare proxy and will be coming in the afternoon to discuss surgical intervention.  (09 Sep 2021 18:18)    PAST MEDICAL & SURGICAL HISTORY:  Osteopenia    High cholesterol    Atrial fibrillation    No significant past surgical history      MEDICATIONS  (STANDING):  influenza   Vaccine 0.5 milliLiter(s) IntraMuscular once    MEDICATIONS  (PRN):    Allergies    No Known Allergies    Intolerances        REVIEW OF SYSTEMS:    CONSTITUTIONAL: No weakness, fevers or chills.   EYES/ENT: No visual changes;    NECK: No pain or stiffness  RESPIRATORY: No cough, wheezing, No shortness of breath  CARDIOVASCULAR: No chest pain or palpitations  GASTROINTESTINAL: No abdominal pain, or hematochezia.  GENITOURINARY: No dysuria orhematuria  NEUROLOGICAL: No numbness or weakness  SKIN: No itching, burning, rashes  All other review of systems is negative unless indicated above    Vital Signs Last 24 Hrs  T(C): 36.7 (11 Sep 2021 11:12), Max: 36.8 (10 Sep 2021 11:55)  T(F): 98 (11 Sep 2021 11:12), Max: 98.3 (10 Sep 2021 11:55)  HR: 96 (11 Sep 2021 11:12) (77 - 96)  BP: 136/83 (11 Sep 2021 11:12) (136/77 - 153/79)  BP(mean): --  RR: 16 (11 Sep 2021 11:12) (16 - 20)  SpO2: 96% (11 Sep 2021 11:12) (92% - 96%)  I&O's Summary    10 Sep 2021 07:01  -  11 Sep 2021 07:00  --------------------------------------------------------  IN: 945 mL / OUT: 600 mL / NET: 345 mL        PHYSICAL EXAM:  pt in O.R.  LABS: All Labs Reviewed:                          14.8   11.17 )-----------( 222      ( 11 Sep 2021 07:02 )             42.7     09-11    135  |  103  |  13  ----------------------------<  132<H>  3.6   |  23  |  0.78    Ca    8.5      11 Sep 2021 07:02    TPro  6.8  /  Alb  2.8<L>  /  TBili  1.5<H>  /  DBili  x   /  AST  37  /  ALT  23  /  AlkPhos  53  09-11    PT/INR - ( 11 Sep 2021 07:02 )   PT: 16.7 sec;   INR: 1.45 ratio         PTT - ( 11 Sep 2021 07:02 )  PTT:26.7 sec      CT Head No Cont:   EXAM:  CT CERVICAL SPINE                          EXAM:  CT BRAIN                            PROCEDURE DATE:  09/09/2021      INTERPRETATION:  CLINICAL STATEMENT: Trauma..    TECHNIQUE: CT of the head and cervical spine were performed without IV contrast. 3-D/MIP images obtained    COMPARISON: CT head and cervical spine 2/20/2021    FINDINGS:  Head:  There is mild diffuse parenchymal volume loss. There are areas of low attenuation in the periventricular white matter likely related to mild chronic microvascular ischemic changes.    There is no acute intracranial hemorrhage, parenchymal mass, mass effect or midline shift. There is no acute territorial infarct. There is no hydrocephalus. Partial empty sella    The cranium is intact.    Mucosal thickening left aspect of frontal sinus.    Cervical spine:  The vertebral body heights and alignment are maintained. There is no acute fracture.    There is no prevertebral soft tissue swelling. The odontoid is intact.    Evaluation of the spinal canal is limited on a CT exam.  Multilevel degenerative changes noted resulting in multilevel spinal canal stenosis and neural foraminal narrowing.    IMPRESSION:  No acute intracranial hemorrhage    No acute fracture cervical spine. If pain persists, follow-up MRI exam recommended    --- End of Report ---      CORA RODRIGEZ MD; Attending Radiologist  This document has been electronically signed. Sep  9 2021 12:06PM (09-09-21 @ 11:18)  12 Lead ECG:   Ventricular Rate 73 BPM    Atrial Rate 98 BPM    QRS Duration 76 ms    Q-T Interval 400 ms    QTC Calculation(Bazett) 440 ms    R Axis -32 degrees    T Axis -57 degrees    Diagnosis Line Atrial fibrillation with occasional ventricular-paced complexes  Left axis deviation  Septal infarct , age undetermined  ST & T wave abnormality, consider inferior ischemia  Abnormal ECG  No previous ECGs available  Confirmed by Hansel Rosa MD ([id number]) on 9/10/2021 4:37:20 PM (09-09-21 @ 11:00)      87y Female who presented to the ED s/p fall at home as she was making her bed. She landed on her right side unwitnessed was helped up by her son in law. She denies any LOC or head trauma. Currently complains of right hip pain, declines any other injuries. Pt is a poor historian and confused at baseline. Has a past medical history of A fib on coumadin 10 mg daily, unsure if she got a dose today will confirm with daughter. Has a pacemaker, dementia, and osteopenia. Son in law states she has a UTI and was started on cipro 2 days ago and has not finished. Daughter is the Healthcare proxy and will be coming in the afternoon to discuss surgical intervention.  (09 Sep 2021 18:18)    HIP fx  Pt denies CP or SOB  EKG paced with AF  PPM interrogation in chart, no events last 90 days. battery>2.5 years   coumadin held for OR  Cont metoprolol, diltiazem, statin   pt in O.R. now  would restart coumadin POD 0 (today) or POD 1 with heparin bridge, discussed with ortho team  pt sees my partner Dr Delong, EP, [phone]  Will follow here as well

## 2021-09-11 NOTE — DISCHARGE NOTE PROVIDER - NSDCCPTREATMENT_GEN_ALL_CORE_FT
PRINCIPAL PROCEDURE  Procedure: Closed reduction and percutaneous pinning of right hip  Findings and Treatment:

## 2021-09-11 NOTE — PROGRESS NOTE ADULT - SUBJECTIVE AND OBJECTIVE BOX
Orthopedics Post-op Check  POD 0  Patient seen and examined at bedside. Pain is controlled. Patient is feeling well and denies any nausea or vomiting.    Exam:  T(C): 36.9 (09-11-21 @ 15:57), Max: 37.2 (09-11-21 @ 13:03)  T(F): 98.5 (09-11-21 @ 15:57), Max: 99 (09-11-21 @ 13:03)  HR: 72 (09-11-21 @ 15:57) (68 - 96)  BP: 112/70 (09-11-21 @ 15:57) (112/70 - 153/79)  RR: 14 (09-11-21 @ 15:57) (12 - 18)  SpO2: 91% (09-11-21 @ 15:57) (91% - 99%)    Gen: NAD, resting comfortably  Dressing c/d/i  +EHL/FHL/TA/GS  SILT L2-S1  Compartments soft and compressible  DP/PT pulses palpable  No calf TTP bilaterally                          15.1   12.37 )-----------( 212      ( 11 Sep 2021 14:04 )             44.4     11 Sep 2021 14:04    133    |  102    |  15     ----------------------------<  114    3.6     |  25     |  0.81     Ca    8.5        11 Sep 2021 14:04    TPro  6.8    /  Alb  2.8    /  TBili  1.5    /  DBili  x      /  AST  37     /  ALT  23     /  AlkPhos  53     11 Sep 2021 07:02    PT/INR - ( 11 Sep 2021 07:02 )   PT: 16.7 sec;   INR: 1.45 ratio         PTT - ( 11 Sep 2021 07:02 )  PTT:26.7 sec    A/P:  87yFemale POD 0  s/p R Hip CRPP    Follow up postoperative labs  WBAT  Pain management PRN  PT/OT  Continue PPx antibiotics  DVT PPx: restarted home dose coumadin 10mg on POD0 per cards recommendations   Incentive spirometry  Discussed the above with attending who agrees with plan

## 2021-09-11 NOTE — PROGRESS NOTE ADULT - SUBJECTIVE AND OBJECTIVE BOX
Patient is a 87y old  Female who presents with a chief complaint of fall (11 Sep 2021 17:14)    Date of servie : 21 @ 20:33  INTERVAL HPI/OVERNIGHT EVENTS:  T(C): 37 (21 @ 20:30), Max: 37.2 (21 @ 13:03)  HR: 76 (21 @ 20:30) (68 - 96)  BP: 112/72 (21 @ 20:30) (112/70 - 140/85)  RR: 15 (21 @ 20:30) (12 - 17)  SpO2: 93% (21 @ 20:30) (91% - 99%)  Wt(kg): --  I&O's Summary    10 Sep 2021 07:01  -  11 Sep 2021 07:00  --------------------------------------------------------  IN: 945 mL / OUT: 600 mL / NET: 345 mL    11 Sep 2021 07:01  -  11 Sep 2021 20:33  --------------------------------------------------------  IN: 600 mL / OUT: 0 mL / NET: 600 mL        LABS:                        15.1   12.37 )-----------( 212      ( 11 Sep 2021 14:04 )             44.4     0911    133<L>  |  102  |  15  ----------------------------<  114<H>  3.6   |  25  |  0.81    Ca    8.5      11 Sep 2021 14:04    TPro  6.8  /  Alb  2.8<L>  /  TBili  1.5<H>  /  DBili  x   /  AST  37  /  ALT  23  /  AlkPhos  53  09-11    PT/INR - ( 11 Sep 2021 07:02 )   PT: 16.7 sec;   INR: 1.45 ratio         PTT - ( 11 Sep 2021 07:02 )  PTT:26.7 sec  Urinalysis Basic - ( 10 Sep 2021 07:31 )    Color: Yellow / Appearance: Clear / S.010 / pH: x  Gluc: x / Ketone: Small  / Bili: Negative / Urobili: Negative   Blood: x / Protein: 15 / Nitrite: Negative   Leuk Esterase: Negative / RBC: x / WBC 6-10   Sq Epi: x / Non Sq Epi: Occasional / Bacteria: x      CAPILLARY BLOOD GLUCOSE            Urinalysis Basic - ( 10 Sep 2021 07:31 )    Color: Yellow / Appearance: Clear / S.010 / pH: x  Gluc: x / Ketone: Small  / Bili: Negative / Urobili: Negative   Blood: x / Protein: 15 / Nitrite: Negative   Leuk Esterase: Negative / RBC: x / WBC 6-10   Sq Epi: x / Non Sq Epi: Occasional / Bacteria: x        MEDICATIONS  (STANDING):  acetaminophen   Tablet .. 650 milliGRAM(s) Oral every 6 hours  atorvastatin 10 milliGRAM(s) Oral at bedtime  ceFAZolin   IVPB 2000 milliGRAM(s) IV Intermittent every 8 hours  diltiazem    milliGRAM(s) Oral daily  diltiazem    milliGRAM(s) Oral at bedtime  influenza   Vaccine 0.5 milliLiter(s) IntraMuscular once  memantine 10 milliGRAM(s) Oral daily  metoprolol succinate ER 25 milliGRAM(s) Oral daily  multivitamin 1 Tablet(s) Oral daily  PARoxetine 20 milliGRAM(s) Oral daily  sodium chloride 0.9%. 1000 milliLiter(s) (100 mL/Hr) IV Continuous <Continuous>  warfarin 2.5 milliGRAM(s) Oral once    MEDICATIONS  (PRN):  melatonin 3 milliGRAM(s) Oral at bedtime PRN Insomnia  oxyCODONE    IR 2.5 milliGRAM(s) Oral every 4 hours PRN Moderate Pain (4 - 6)  oxyCODONE    IR 5 milliGRAM(s) Oral every 4 hours PRN Severe Pain (7 - 10)  polyethylene glycol 3350 17 Gram(s) Oral at bedtime PRN Constipation  senna 2 Tablet(s) Oral at bedtime PRN Constipation  traMADol 50 milliGRAM(s) Oral every 6 hours PRN Mild Pain (1 - 3)          PHYSICAL EXAM:  GENERAL: NAD, well-groomed, well-developed  HEAD:  Atraumatic, Normocephalic  CHEST/LUNG: Clear to percussion bilaterally; No rales, rhonchi, wheezing, or rubs  HEART: Regular rate and rhythm; No murmurs, rubs, or gallops  ABDOMEN: Soft, Nontender, Nondistended; Bowel sounds present  EXTREMITIES:  2+ Peripheral Pulses, No clubbing, cyanosis, or edema  LYMPH: No lymphadenopathy noted  SKIN: No rashes or lesions    Care Discussed with Consultants/Other Providers [ ] YES  [ ] NO

## 2021-09-11 NOTE — PROGRESS NOTE ADULT - SUBJECTIVE AND OBJECTIVE BOX
HPI: Patient is a 87y Female with R FN Fx. Patient is in minimal pain overnight. Denies numbness, weakness, CP/SOB/N/V. No other complaints at this time. Baseline dementia.    PAST MEDICAL & SURGICAL HISTORY:  Osteopenia    High cholesterol    Atrial fibrillation        Allergies: No Known Allergies    LABS:                        14.8   11.17 )-----------( 222      ( 11 Sep 2021 07:02 )             42.7     09-10    137  |  102  |  13  ----------------------------<  110<H>  3.9   |  26  |  0.68    Ca    8.7      10 Sep 2021 07:14  Mg     2.3     -    TPro  7.3  /  Alb  3.0<L>  /  TBili  1.2  /  DBili  x   /  AST  19  /  ALT  22  /  AlkPhos  59  09-10    PT/INR - ( 10 Sep 2021 22:27 )   PT: 19.4 sec;   INR: 1.70 ratio         PTT - ( 10 Sep 2021 07:14 )  PTT:35.1 sec  Urinalysis Basic - ( 10 Sep 2021 07:31 )    Color: Yellow / Appearance: Clear / S.010 / pH: x  Gluc: x / Ketone: Small  / Bili: Negative / Urobili: Negative   Blood: x / Protein: 15 / Nitrite: Negative   Leuk Esterase: Negative / RBC: x / WBC 6-10   Sq Epi: x / Non Sq Epi: Occasional / Bacteria: x        VITAL SIGNS:  T(C): 36.6 (21 @ 04:46), Max: 36.8 (09-10-21 @ 11:55)  HR: 87 (21 @ 04:46) (77 - 93)  BP: 139/91 (21 @ 04:46) (136/77 - 153/79)  RR: 17 (21 @ 04:46) (17 - 20)  SpO2: 92% (21 @ 04:46) (92% - 95%)        Physical Exam:  General: NAD, Alert, Awake and AOx1     RLE: Skin over R hip intact.  Tenderness to the right hip  SILT L2-S1  +EHL, +TA, +GS  +DP Pulse    Imaging:     xray right hip   minimally displaced right surgical neck fracture      Orthopedic A/P:    Pt is a  87y Female with right surgical neck fracture requiring surgical intervention     - Plan for OR for CRPP today  - INR continues to trend down while holding coumadin   - Pain control PRN  -Medical management, clearance and cardiac clearance appreciated  -Holding chem DVT ppx  - follow up labs and INR  -Discussed with attending who is aware and approves of plan.

## 2021-09-11 NOTE — DISCHARGE NOTE PROVIDER - HOSPITAL COURSE
87y Female who presented to the ED s/p fall at home as she was making her bed. She landed on her right side unwitnessed was helped up by her son in law. She denies any LOC or head trauma. Currently complains of right hip pain, declines any other injuries. Pt is a poor historian and confused at baseline. Has a past medical history of A fib on coumadin 10 mg daily, unsure if she got a dose today will confirm with daughter. sp OR, cleared by ortho for dc    pt on coumadin for A fib, now on lovenox bridge with coumadin  check inr daily and dose coumadin  dc lovenox once INR between 2-3

## 2021-09-11 NOTE — DISCHARGE NOTE PROVIDER - NSDCFUADDINST_GEN_ALL_CORE_FT
Discharge Instructions for Right Hip CRPP:    1. PAIN CONTROL: See Med Rec.  2. ACTIVITY: Weight Bearing as Tolerated with assistance and rolling walker  3. PT: daily  4. DVT/PE PROPHYLAXIS: Continue DVT/PE Prophylaxis. See Med Rec for Duration and dose.  5. BANDAGE: Change dressing to a new bandage on postop day 7 (9/18). May change sooner if dressing saturated or falling off. DO NOT REMOVE BANDAGE TO CHECK WOUND ON INTAKE.  6. STAPLES: If you have staples, they will be removed on POD14 (9/25).  7. SHOWER: Okay to shower. Do not soak, submerge or let shower stream beat on dressing/wound.  8. FOLLOW UP: Follow-up with Orthopedic Surgeon Dr. Garcia in 14 Days. Call Office For Appointment.

## 2021-09-11 NOTE — DISCHARGE NOTE PROVIDER - CARE PROVIDER_API CALL
Valdemar Garcia)  Orthopaedic Surgery  [address] [place] [address]  Phone: [phone]  Fax: [phone]  Follow Up Time:

## 2021-09-11 NOTE — PROGRESS NOTE ADULT - ASSESSMENT
1 Femur fracture  - spp  OR - pain control  - ortho fu  - will monitor     2 Afib  - restart coumadin  - cards fu  - PPM interogation    3 UTI  - sp cipro for 2 days  - cw ceftriaxone

## 2021-09-11 NOTE — PHARMACOTHERAPY INTERVENTION NOTE - COMMENTS
Pt is a 87y F ordered 10mg of warfarin for afib. Discussed with cardio Dr. Najera that since patient elderly (>85) and just underwent surgery to limit dose to 2.5mg today and can increase dose gradually. Since patient takes 10mg of warfarin at home, MD okay with 2.5mg today but wants to increase to 5mg tomorrow and will assess INR from there on.
Patient is 87y F presenting s/p fall. Discussed with attending Dr. Hancock that there is a drug interaction between simvastatin and diltiazem, where the dose of simvastatin should be limited to 10 mg daily. The patient takes pravastatin 20 mg at home. Recommended changing inpatient statin to atorvastatin 10 mg daily to avoid drug interaction. Accepted.

## 2021-09-11 NOTE — BRIEF OPERATIVE NOTE - NSICDXBRIEFPROCEDURE_GEN_ALL_CORE_FT
PROCEDURES:  Closed reduction and percutaneous pinning of right hip 11-Sep-2021 13:09:59  Josef Garcia

## 2021-09-11 NOTE — DISCHARGE NOTE PROVIDER - NSDCMRMEDTOKEN_GEN_ALL_CORE_FT
ciprofloxacin 500 mg oral tablet: 1 tab(s) orally 2 times a day for 5 days.  Start: 9/7/21  DilTIAZem (Eqv-Cardizem CD) 120 mg/24 hours oral capsule, extended release: 2 capsule(s) orally once daily in the morning and then 1 capsule orally once in the evening.  folic acid 1 mg oral tablet: 1 tab(s) orally once a day  memantine 10 mg oral tablet: 1 tab(s) orally once a day  multivitamin: 1 tab(s) orally once a day  PARoxetine 20 mg oral tablet: 1 tab(s) orally once a day  pravastatin 20 mg oral tablet: 1 tab(s) orally once a day  Toprol-XL 25 mg oral tablet, extended release: 1 tab(s) orally once a day  warfarin 10 mg oral tablet: 1 tab(s) orally once a day   DilTIAZem (Eqv-Cardizem CD) 120 mg/24 hours oral capsule, extended release: 2 capsule(s) orally once daily in the morning and then 1 capsule orally once in the evening.  enoxaparin: 55 unit(s) subcutaneous 2 times a day    please stop once INR above 2   folic acid 1 mg oral tablet: 1 tab(s) orally once a day  melatonin 3 mg oral tablet: 1 tab(s) orally once a day (at bedtime), As needed, Insomnia  memantine 10 mg oral tablet: 1 tab(s) orally once a day  multivitamin: 1 tab(s) orally once a day  oxyCODONE 5 mg oral tablet: 1 tab(s) orally every 4 hours, As needed, Severe Pain (7 - 10)  PARoxetine 20 mg oral tablet: 1 tab(s) orally once a day  pravastatin 20 mg oral tablet: 1 tab(s) orally once a day  Toprol-XL 25 mg oral tablet, extended release: 1 tab(s) orally once a day  traMADol 50 mg oral tablet: 1 tab(s) orally every 6 hours, As needed, Mild Pain (1 - 3)  warfarin 10 mg oral tablet: 1 tab(s) orally once a day

## 2021-09-12 LAB
ALBUMIN SERPL ELPH-MCNC: 2.2 G/DL — LOW (ref 3.3–5)
ALP SERPL-CCNC: 43 U/L — SIGNIFICANT CHANGE UP (ref 40–120)
ALT FLD-CCNC: 16 U/L — SIGNIFICANT CHANGE UP (ref 12–78)
ANION GAP SERPL CALC-SCNC: 5 MMOL/L — SIGNIFICANT CHANGE UP (ref 5–17)
AST SERPL-CCNC: 30 U/L — SIGNIFICANT CHANGE UP (ref 15–37)
BASOPHILS # BLD AUTO: 0.03 K/UL — SIGNIFICANT CHANGE UP (ref 0–0.2)
BASOPHILS NFR BLD AUTO: 0.3 % — SIGNIFICANT CHANGE UP (ref 0–2)
BILIRUB SERPL-MCNC: 0.6 MG/DL — SIGNIFICANT CHANGE UP (ref 0.2–1.2)
BUN SERPL-MCNC: 16 MG/DL — SIGNIFICANT CHANGE UP (ref 7–23)
CALCIUM SERPL-MCNC: 7.6 MG/DL — LOW (ref 8.5–10.1)
CHLORIDE SERPL-SCNC: 105 MMOL/L — SIGNIFICANT CHANGE UP (ref 96–108)
CO2 SERPL-SCNC: 27 MMOL/L — SIGNIFICANT CHANGE UP (ref 22–31)
CREAT SERPL-MCNC: 0.78 MG/DL — SIGNIFICANT CHANGE UP (ref 0.5–1.3)
EOSINOPHIL # BLD AUTO: 0.06 K/UL — SIGNIFICANT CHANGE UP (ref 0–0.5)
EOSINOPHIL NFR BLD AUTO: 0.6 % — SIGNIFICANT CHANGE UP (ref 0–6)
GLUCOSE SERPL-MCNC: 85 MG/DL — SIGNIFICANT CHANGE UP (ref 70–99)
HCT VFR BLD CALC: 39.7 % — SIGNIFICANT CHANGE UP (ref 34.5–45)
HGB BLD-MCNC: 13.4 G/DL — SIGNIFICANT CHANGE UP (ref 11.5–15.5)
IMM GRANULOCYTES NFR BLD AUTO: 1 % — SIGNIFICANT CHANGE UP (ref 0–1.5)
INR BLD: 1.42 RATIO — HIGH (ref 0.88–1.16)
LYMPHOCYTES # BLD AUTO: 0.81 K/UL — LOW (ref 1–3.3)
LYMPHOCYTES # BLD AUTO: 8.4 % — LOW (ref 13–44)
MCHC RBC-ENTMCNC: 31.8 PG — SIGNIFICANT CHANGE UP (ref 27–34)
MCHC RBC-ENTMCNC: 33.8 GM/DL — SIGNIFICANT CHANGE UP (ref 32–36)
MCV RBC AUTO: 94.1 FL — SIGNIFICANT CHANGE UP (ref 80–100)
MONOCYTES # BLD AUTO: 1.13 K/UL — HIGH (ref 0–0.9)
MONOCYTES NFR BLD AUTO: 11.7 % — SIGNIFICANT CHANGE UP (ref 2–14)
NEUTROPHILS # BLD AUTO: 7.53 K/UL — HIGH (ref 1.8–7.4)
NEUTROPHILS NFR BLD AUTO: 78 % — HIGH (ref 43–77)
NRBC # BLD: 0 /100 WBCS — SIGNIFICANT CHANGE UP (ref 0–0)
PLATELET # BLD AUTO: 188 K/UL — SIGNIFICANT CHANGE UP (ref 150–400)
POTASSIUM SERPL-MCNC: 3.6 MMOL/L — SIGNIFICANT CHANGE UP (ref 3.5–5.3)
POTASSIUM SERPL-SCNC: 3.6 MMOL/L — SIGNIFICANT CHANGE UP (ref 3.5–5.3)
PROT SERPL-MCNC: 5.8 G/DL — LOW (ref 6–8.3)
PROTHROM AB SERPL-ACNC: 16.3 SEC — HIGH (ref 10.6–13.6)
RBC # BLD: 4.22 M/UL — SIGNIFICANT CHANGE UP (ref 3.8–5.2)
RBC # FLD: 14 % — SIGNIFICANT CHANGE UP (ref 10.3–14.5)
SODIUM SERPL-SCNC: 137 MMOL/L — SIGNIFICANT CHANGE UP (ref 135–145)
WBC # BLD: 9.66 K/UL — SIGNIFICANT CHANGE UP (ref 3.8–10.5)
WBC # FLD AUTO: 9.66 K/UL — SIGNIFICANT CHANGE UP (ref 3.8–10.5)

## 2021-09-12 RX ORDER — WARFARIN SODIUM 2.5 MG/1
5 TABLET ORAL ONCE
Refills: 0 | Status: COMPLETED | OUTPATIENT
Start: 2021-09-12 | End: 2021-09-12

## 2021-09-12 RX ADMIN — Medication 20 MILLIGRAM(S): at 11:27

## 2021-09-12 RX ADMIN — Medication 240 MILLIGRAM(S): at 05:42

## 2021-09-12 RX ADMIN — Medication 650 MILLIGRAM(S): at 11:27

## 2021-09-12 RX ADMIN — ATORVASTATIN CALCIUM 10 MILLIGRAM(S): 80 TABLET, FILM COATED ORAL at 21:10

## 2021-09-12 RX ADMIN — Medication 650 MILLIGRAM(S): at 12:24

## 2021-09-12 RX ADMIN — Medication 650 MILLIGRAM(S): at 17:31

## 2021-09-12 RX ADMIN — Medication 25 MILLIGRAM(S): at 05:42

## 2021-09-12 RX ADMIN — Medication 1 TABLET(S): at 11:27

## 2021-09-12 RX ADMIN — MEMANTINE HYDROCHLORIDE 10 MILLIGRAM(S): 10 TABLET ORAL at 11:27

## 2021-09-12 RX ADMIN — Medication 120 MILLIGRAM(S): at 21:10

## 2021-09-12 RX ADMIN — TRAMADOL HYDROCHLORIDE 50 MILLIGRAM(S): 50 TABLET ORAL at 13:42

## 2021-09-12 RX ADMIN — Medication 650 MILLIGRAM(S): at 05:42

## 2021-09-12 RX ADMIN — TRAMADOL HYDROCHLORIDE 50 MILLIGRAM(S): 50 TABLET ORAL at 14:30

## 2021-09-12 RX ADMIN — Medication 100 MILLIGRAM(S): at 04:11

## 2021-09-12 RX ADMIN — WARFARIN SODIUM 5 MILLIGRAM(S): 2.5 TABLET ORAL at 21:11

## 2021-09-12 RX ADMIN — Medication 3 MILLIGRAM(S): at 21:11

## 2021-09-12 NOTE — PROGRESS NOTE ADULT - SUBJECTIVE AND OBJECTIVE BOX
[facility] Cardiology    CHIEF COMPLAINT: Patient is a 87y old  Female who presents with a chief complaint of fall (12 Sep 2021 06:49)      Follow Up: [ ] Chest Pain      [ ] Dyspnea     [ ] Palpitations    [ ] Atrial Fibrillation     [ ] Ventricular Dysrhythmia    [ ] Abnormal EKG                      [ ] Abnormal Cardiac Enzymes     [ ] Valvular Disease    HPI:  87y Female who presented to the ED s/p fall at home as she was making her bed. She landed on her right side unwitnessed was helped up by her son in law. She denies any LOC or head trauma. Currently complains of right hip pain, declines any other injuries. Pt is a poor historian and confused at baseline. Has a past medical history of A fib on coumadin 10 mg daily, unsure if she got a dose today will confirm with daughter. Has a pacemaker, dementia, and osteopenia. Son in law states she has a UTI and was started on cipro 2 days ago and has not finished. Daughter is the Healthcare proxy and will be coming in the afternoon to discuss surgical intervention.  (09 Sep 2021 18:18)    PAST MEDICAL & SURGICAL HISTORY:  Osteopenia    High cholesterol    Atrial fibrillation    No significant past surgical history      MEDICATIONS  (STANDING):  acetaminophen   Tablet .. 650 milliGRAM(s) Oral every 6 hours  atorvastatin 10 milliGRAM(s) Oral at bedtime  diltiazem    milliGRAM(s) Oral at bedtime  diltiazem    milliGRAM(s) Oral daily  influenza   Vaccine 0.5 milliLiter(s) IntraMuscular once  memantine 10 milliGRAM(s) Oral daily  metoprolol succinate ER 25 milliGRAM(s) Oral daily  multivitamin 1 Tablet(s) Oral daily  PARoxetine 20 milliGRAM(s) Oral daily  sodium chloride 0.9%. 1000 milliLiter(s) (100 mL/Hr) IV Continuous <Continuous>  warfarin 5 milliGRAM(s) Oral once    MEDICATIONS  (PRN):  melatonin 3 milliGRAM(s) Oral at bedtime PRN Insomnia  oxyCODONE    IR 2.5 milliGRAM(s) Oral every 4 hours PRN Moderate Pain (4 - 6)  oxyCODONE    IR 5 milliGRAM(s) Oral every 4 hours PRN Severe Pain (7 - 10)  polyethylene glycol 3350 17 Gram(s) Oral at bedtime PRN Constipation  senna 2 Tablet(s) Oral at bedtime PRN Constipation  traMADol 50 milliGRAM(s) Oral every 6 hours PRN Mild Pain (1 - 3)    Allergies    No Known Allergies    Intolerances        REVIEW OF SYSTEMS:    CONSTITUTIONAL: No weakness, fevers or chills.   EYES/ENT: No visual changes;    NECK: No pain or stiffness  RESPIRATORY: No cough, wheezing, No shortness of breath  CARDIOVASCULAR: No chest pain or palpitations  GASTROINTESTINAL: No abdominal pain, or hematochezia.  GENITOURINARY: No dysuria orhematuria  NEUROLOGICAL: No numbness or weakness  SKIN: No itching, burning, rashes  All other review of systems is negative unless indicated above    Vital Signs Last 24 Hrs  T(C): 36.4 (12 Sep 2021 12:19), Max: 37 (11 Sep 2021 20:30)  T(F): 97.6 (12 Sep 2021 12:19), Max: 98.6 (11 Sep 2021 20:30)  HR: 96 (12 Sep 2021 12:19) (70 - 96)  BP: 126/79 (12 Sep 2021 12:19) (112/70 - 150/87)  BP(mean): --  RR: 15 (12 Sep 2021 12:19) (12 - 18)  SpO2: 91% (12 Sep 2021 12:19) (91% - 98%)  I&O's Summary    11 Sep 2021 07:01  -  12 Sep 2021 07:00  --------------------------------------------------------  IN: 1700 mL / OUT: 700 mL / NET: 1000 mL        PHYSICAL EXAM:  Constitutional: NAD  Neurological: Alert, no focal deficits  HEENT: no JVD, EOMI  Cardiovascular: Regular, S1 and S2, no murmur  Pulmonary: breath sounds bilaterally  Gastrointestinal: Bowel Sounds present, soft, nontender  EXT:  no peripheral edema  Skin: No rashes.  Psych:  Mood calm  LABS: All Labs Reviewed:                          13.4   9.66  )-----------( 188      ( 12 Sep 2021 08:20 )             39.7     09-12    137  |  105  |  16  ----------------------------<  85  3.6   |  27  |  0.78    Ca    7.6<L>      12 Sep 2021 08:20    TPro  5.8<L>  /  Alb  2.2<L>  /  TBili  0.6  /  DBili  x   /  AST  30  /  ALT  16  /  AlkPhos  43  09-12    PT/INR - ( 12 Sep 2021 08:20 )   PT: 16.3 sec;   INR: 1.42 ratio         PTT - ( 11 Sep 2021 07:02 )  PTT:26.7 sec    87y Female who presented to the ED s/p fall at home as she was making her bed. She landed on her right side unwitnessed was helped up by her son in law. She denies any LOC or head trauma. Currently complains of right hip pain, declines any other injuries. Pt is a poor historian and confused at baseline. Has a past medical history of A fib on coumadin 10 mg daily, unsure if she got a dose today will confirm with daughter. Has a pacemaker, dementia, and osteopenia. Son in law states she has a UTI and was started on cipro 2 days ago and has not finished. Daughter is the Healthcare proxy and will be coming in the afternoon to discuss surgical intervention.  (09 Sep 2021 18:18)    HIP fx  Pt denies CP or SOB  EKG paced with AF  PPM interrogation in chart, no events last 90 days. battery>2.5 years    Cont metoprolol, diltiazem, statin  Pt now POD #1 Hip surgery  restarted on Coumadin POD 0  Cont Coumadin  pt sees my partner Dr Delong, EP, [phone]  Will follow here as well

## 2021-09-12 NOTE — PHYSICAL THERAPY INITIAL EVALUATION ADULT - LEVEL OF INDEPENDENCE: SIT/STAND, REHAB EVAL
New Pulmonary Hypertension Patient Form       Patient Name: Catherine Sharp Age: 41F [birth year] MRN: [id number]   Referring Provider: Dr. Baerden        Date Test Results        10/29/2018 Echo RVSP: 50-55 LVEF: 55%       RV: RV mildly enlarged w/mildly reduced RV function     Date Chest CT 1.  Extensive areas of groundglass opacity, airspace disease, fibroatelectasis and consolidation overall not significantly changed from prior either recurrent or chronic. Air trapping most often associated with small airways or small vessel disease.  Other lung findings stable. Similar mildly prominent right hilar adenopathy. 2.  Evidence for pulmonary artery hypertension. No pulmonary embolus, aortic aneurysm or dissection.    Misc. ILD, lupus, respiratory failure         moderate assist (50% patients effort)

## 2021-09-12 NOTE — PROGRESS NOTE ADULT - ASSESSMENT
1 Femur fracture  - spp  OR   - pain control  - ortho fu  - will monitor     2 Afib  - restart coumadin  - cards fu  - PPM interogation    3 UTI  - finished antibiotics

## 2021-09-12 NOTE — PROGRESS NOTE ADULT - SUBJECTIVE AND OBJECTIVE BOX
Patient seen and examined at bedside. Pain is controlled. Patient is feeling well and denies any nausea, vomiting, numbness, tingling.      VITAL SIGNS:  T(C): 36.6 (21 @ 04:57), Max: 37.2 (21 @ 13:03)  HR: 92 (21 @ 04:57) (68 - 96)  BP: 150/87 (21 @ 04:57) (112/70 - 150/87)  RR: 15 (21 @ 05:38) (12 - 18)  SpO2: 95% (21 @ 05:38) (91% - 99%)      LABS:                        15.1   12.37 )-----------( 212      ( 11 Sep 2021 14:04 )             44.4         133<L>  |  102  |  15  ----------------------------<  114<H>  3.6   |  25  |  0.81    Ca    8.5      11 Sep 2021 14:04    TPro  6.8  /  Alb  2.8<L>  /  TBili  1.5<H>  /  DBili  x   /  AST  37  /  ALT  23  /  AlkPhos  53  09-11    PT/INR - ( 11 Sep 2021 07:02 )   PT: 16.7 sec;   INR: 1.45 ratio         PTT - ( 11 Sep 2021 07:02 )  PTT:26.7 sec  Urinalysis Basic - ( 10 Sep 2021 07:31 )    Color: Yellow / Appearance: Clear / S.010 / pH: x  Gluc: x / Ketone: Small  / Bili: Negative / Urobili: Negative   Blood: x / Protein: 15 / Nitrite: Negative   Leuk Esterase: Negative / RBC: x / WBC 6-10   Sq Epi: x / Non Sq Epi: Occasional / Bacteria: x      Exam:    Gen: NAD, resting comfortably  Dressing c/d/i  +EHL/FHL/TA/GS  SILT L2-S1  Compartments soft and compressible  DP/PT pulses palpable  No calf TTP bilaterally            A/P:  87yFemale POD 1  s/p R Hip CRPP    Follow up AM labs  WBAT  Pain management PRN  PT/OT  Continue PPx antibiotics  DVT PPx: home dose coumadin 10mg per cards recommendations   Incentive spirometry  Discussed the above with attending who agrees with plan

## 2021-09-12 NOTE — PROGRESS NOTE ADULT - SUBJECTIVE AND OBJECTIVE BOX
Patient is a 87y old  Female who presents with a chief complaint of fall (12 Sep 2021 16:26)    Date of servie : 09-12-21 @ 19:31  INTERVAL HPI/OVERNIGHT EVENTS:  T(C): 36.4 (09-12-21 @ 12:19), Max: 37 (09-11-21 @ 20:30)  HR: 96 (09-12-21 @ 12:19) (73 - 96)  BP: 126/79 (09-12-21 @ 12:19) (112/72 - 150/87)  RR: 15 (09-12-21 @ 12:19) (15 - 18)  SpO2: 91% (09-12-21 @ 12:19) (91% - 97%)  Wt(kg): --  I&O's Summary    11 Sep 2021 07:01  -  12 Sep 2021 07:00  --------------------------------------------------------  IN: 1700 mL / OUT: 700 mL / NET: 1000 mL        LABS:                        13.4   9.66  )-----------( 188      ( 12 Sep 2021 08:20 )             39.7     09-12    137  |  105  |  16  ----------------------------<  85  3.6   |  27  |  0.78    Ca    7.6<L>      12 Sep 2021 08:20    TPro  5.8<L>  /  Alb  2.2<L>  /  TBili  0.6  /  DBili  x   /  AST  30  /  ALT  16  /  AlkPhos  43  09-12    PT/INR - ( 12 Sep 2021 08:20 )   PT: 16.3 sec;   INR: 1.42 ratio         PTT - ( 11 Sep 2021 07:02 )  PTT:26.7 sec    CAPILLARY BLOOD GLUCOSE                MEDICATIONS  (STANDING):  acetaminophen   Tablet .. 650 milliGRAM(s) Oral every 6 hours  atorvastatin 10 milliGRAM(s) Oral at bedtime  diltiazem    milliGRAM(s) Oral at bedtime  diltiazem    milliGRAM(s) Oral daily  influenza   Vaccine 0.5 milliLiter(s) IntraMuscular once  memantine 10 milliGRAM(s) Oral daily  metoprolol succinate ER 25 milliGRAM(s) Oral daily  multivitamin 1 Tablet(s) Oral daily  PARoxetine 20 milliGRAM(s) Oral daily  sodium chloride 0.9%. 1000 milliLiter(s) (100 mL/Hr) IV Continuous <Continuous>  warfarin 5 milliGRAM(s) Oral once    MEDICATIONS  (PRN):  melatonin 3 milliGRAM(s) Oral at bedtime PRN Insomnia  oxyCODONE    IR 2.5 milliGRAM(s) Oral every 4 hours PRN Moderate Pain (4 - 6)  oxyCODONE    IR 5 milliGRAM(s) Oral every 4 hours PRN Severe Pain (7 - 10)  polyethylene glycol 3350 17 Gram(s) Oral at bedtime PRN Constipation  senna 2 Tablet(s) Oral at bedtime PRN Constipation  traMADol 50 milliGRAM(s) Oral every 6 hours PRN Mild Pain (1 - 3)          PHYSICAL EXAM:  GENERAL: NAD, well-groomed, well-developed  HEAD:  Atraumatic, Normocephalic  CHEST/LUNG: Clear to percussion bilaterally; No rales, rhonchi, wheezing, or rubs  HEART: Regular rate and rhythm; No murmurs, rubs, or gallops  ABDOMEN: Soft, Nontender, Nondistended; Bowel sounds present  EXTREMITIES:  2+ Peripheral Pulses, No clubbing, cyanosis, or edema  LYMPH: No lymphadenopathy noted  SKIN: No rashes or lesions    Care Discussed with Consultants/Other Providers [ ] YES  [ ] NO

## 2021-09-13 LAB
ALBUMIN SERPL ELPH-MCNC: 2.7 G/DL — LOW (ref 3.3–5)
ALP SERPL-CCNC: 52 U/L — SIGNIFICANT CHANGE UP (ref 40–120)
ALT FLD-CCNC: 16 U/L — SIGNIFICANT CHANGE UP (ref 12–78)
ANION GAP SERPL CALC-SCNC: 10 MMOL/L — SIGNIFICANT CHANGE UP (ref 5–17)
AST SERPL-CCNC: 35 U/L — SIGNIFICANT CHANGE UP (ref 15–37)
BILIRUB SERPL-MCNC: 1.1 MG/DL — SIGNIFICANT CHANGE UP (ref 0.2–1.2)
BUN SERPL-MCNC: 18 MG/DL — SIGNIFICANT CHANGE UP (ref 7–23)
CALCIUM SERPL-MCNC: 8 MG/DL — LOW (ref 8.5–10.1)
CHLORIDE SERPL-SCNC: 102 MMOL/L — SIGNIFICANT CHANGE UP (ref 96–108)
CO2 SERPL-SCNC: 24 MMOL/L — SIGNIFICANT CHANGE UP (ref 22–31)
CREAT SERPL-MCNC: 0.76 MG/DL — SIGNIFICANT CHANGE UP (ref 0.5–1.3)
GLUCOSE SERPL-MCNC: 94 MG/DL — SIGNIFICANT CHANGE UP (ref 70–99)
HCT VFR BLD CALC: 39.9 % — SIGNIFICANT CHANGE UP (ref 34.5–45)
HGB BLD-MCNC: 13.1 G/DL — SIGNIFICANT CHANGE UP (ref 11.5–15.5)
INR BLD: 1.24 RATIO — HIGH (ref 0.88–1.16)
MCHC RBC-ENTMCNC: 30.8 PG — SIGNIFICANT CHANGE UP (ref 27–34)
MCHC RBC-ENTMCNC: 32.8 GM/DL — SIGNIFICANT CHANGE UP (ref 32–36)
MCV RBC AUTO: 93.9 FL — SIGNIFICANT CHANGE UP (ref 80–100)
NRBC # BLD: 0 /100 WBCS — SIGNIFICANT CHANGE UP (ref 0–0)
PLATELET # BLD AUTO: 201 K/UL — SIGNIFICANT CHANGE UP (ref 150–400)
POTASSIUM SERPL-MCNC: 3.4 MMOL/L — LOW (ref 3.5–5.3)
POTASSIUM SERPL-SCNC: 3.4 MMOL/L — LOW (ref 3.5–5.3)
PROT SERPL-MCNC: 6.8 G/DL — SIGNIFICANT CHANGE UP (ref 6–8.3)
PROTHROM AB SERPL-ACNC: 14.4 SEC — HIGH (ref 10.6–13.6)
RBC # BLD: 4.25 M/UL — SIGNIFICANT CHANGE UP (ref 3.8–5.2)
RBC # FLD: 14 % — SIGNIFICANT CHANGE UP (ref 10.3–14.5)
SODIUM SERPL-SCNC: 136 MMOL/L — SIGNIFICANT CHANGE UP (ref 135–145)
WBC # BLD: 10.48 K/UL — SIGNIFICANT CHANGE UP (ref 3.8–10.5)
WBC # FLD AUTO: 10.48 K/UL — SIGNIFICANT CHANGE UP (ref 3.8–10.5)

## 2021-09-13 RX ORDER — WARFARIN SODIUM 2.5 MG/1
5 TABLET ORAL ONCE
Refills: 0 | Status: COMPLETED | OUTPATIENT
Start: 2021-09-13 | End: 2021-09-13

## 2021-09-13 RX ADMIN — Medication 120 MILLIGRAM(S): at 21:29

## 2021-09-13 RX ADMIN — Medication 240 MILLIGRAM(S): at 05:19

## 2021-09-13 RX ADMIN — Medication 25 MILLIGRAM(S): at 05:19

## 2021-09-13 RX ADMIN — Medication 650 MILLIGRAM(S): at 17:44

## 2021-09-13 RX ADMIN — Medication 650 MILLIGRAM(S): at 05:45

## 2021-09-13 RX ADMIN — ATORVASTATIN CALCIUM 10 MILLIGRAM(S): 80 TABLET, FILM COATED ORAL at 21:29

## 2021-09-13 RX ADMIN — Medication 650 MILLIGRAM(S): at 05:19

## 2021-09-13 RX ADMIN — Medication 3 MILLIGRAM(S): at 21:29

## 2021-09-13 RX ADMIN — TRAMADOL HYDROCHLORIDE 50 MILLIGRAM(S): 50 TABLET ORAL at 10:15

## 2021-09-13 RX ADMIN — Medication 1 TABLET(S): at 11:22

## 2021-09-13 RX ADMIN — Medication 650 MILLIGRAM(S): at 11:21

## 2021-09-13 RX ADMIN — Medication 20 MILLIGRAM(S): at 11:22

## 2021-09-13 RX ADMIN — WARFARIN SODIUM 5 MILLIGRAM(S): 2.5 TABLET ORAL at 21:29

## 2021-09-13 RX ADMIN — MEMANTINE HYDROCHLORIDE 10 MILLIGRAM(S): 10 TABLET ORAL at 11:21

## 2021-09-13 NOTE — OCCUPATIONAL THERAPY INITIAL EVALUATION ADULT - PERTINENT HX OF CURRENT PROBLEM, REHAB EVAL
88 y/o female with PMH A fib, pacemaker, dementia, osteopenia was admitted with a UTI (started on Cipro 2 days prior to admission), R femoral neck fx s/p fall at home. Patient s/p CRPP Right hip 9/11/2021. 88 y/o female with PMH A fib, pacemaker, dementia, osteopenia was admitted with a UTI (started on Cipro 2 days prior to admission) and R femoral neck fx s/p fall at home. Patient s/p CRPP Right hip 9/11/2021.

## 2021-09-13 NOTE — PROGRESS NOTE ADULT - SUBJECTIVE AND OBJECTIVE BOX
[facility] Cardiology Progress Note [phone] (Dr. Cortez, Ray, Dania, Miranda)    CHIEF COMPLAINT: Patient is a 87y old  Female who presents with a chief complaint of Fall. (13 Sep 2021 06:44)      Follow Up Today: The patient denies any chest discomfort or shortness of breath.    HPI:  87y Female who presented to the ED s/p fall at home as she was making her bed. She landed on her right side unwitnessed was helped up by her son in law. She denies any LOC or head trauma. Currently complains of right hip pain, declines any other injuries. Pt is a poor historian and confused at baseline. Has a past medical history of A fib on coumadin 10 mg daily, unsure if she got a dose today will confirm with daughter. Has a pacemaker, dementia, and osteopenia. Son in law states she has a UTI and was started on cipro 2 days ago and has not finished. Daughter is the Healthcare proxy and will be coming in the afternoon to discuss surgical intervention.  (09 Sep 2021 18:18)      PAST MEDICAL & SURGICAL HISTORY:  Osteopenia    High cholesterol    Atrial fibrillation    No significant past surgical history        MEDICATIONS  (STANDING):  acetaminophen   Tablet .. 650 milliGRAM(s) Oral every 6 hours  atorvastatin 10 milliGRAM(s) Oral at bedtime  diltiazem    milliGRAM(s) Oral at bedtime  diltiazem    milliGRAM(s) Oral daily  influenza   Vaccine 0.5 milliLiter(s) IntraMuscular once  memantine 10 milliGRAM(s) Oral daily  metoprolol succinate ER 25 milliGRAM(s) Oral daily  multivitamin 1 Tablet(s) Oral daily  PARoxetine 20 milliGRAM(s) Oral daily  sodium chloride 0.9%. 1000 milliLiter(s) (100 mL/Hr) IV Continuous <Continuous>    MEDICATIONS  (PRN):  melatonin 3 milliGRAM(s) Oral at bedtime PRN Insomnia  oxyCODONE    IR 2.5 milliGRAM(s) Oral every 4 hours PRN Moderate Pain (4 - 6)  oxyCODONE    IR 5 milliGRAM(s) Oral every 4 hours PRN Severe Pain (7 - 10)  polyethylene glycol 3350 17 Gram(s) Oral at bedtime PRN Constipation  senna 2 Tablet(s) Oral at bedtime PRN Constipation  traMADol 50 milliGRAM(s) Oral every 6 hours PRN Mild Pain (1 - 3)      Allergies    No Known Allergies    Intolerances        REVIEW OF SYSTEMS:    All other review of systems is negative unless indicated above    Vital Signs Last 24 Hrs  T(C): 36.6 (13 Sep 2021 04:31), Max: 36.9 (12 Sep 2021 20:40)  T(F): 97.9 (13 Sep 2021 04:31), Max: 98.4 (12 Sep 2021 20:40)  HR: 91 (13 Sep 2021 04:31) (87 - 103)  BP: 158/89 (13 Sep 2021 04:31) (108/73 - 158/89)  BP(mean): --  RR: 17 (13 Sep 2021 04:31) (15 - 17)  SpO2: 93% (13 Sep 2021 04:31) (91% - 93%)    I&O's Summary    12 Sep 2021 07:01  -  13 Sep 2021 07:00  --------------------------------------------------------  IN: 0 mL / OUT: 900 mL / NET: -900 mL        PHYSICAL EXAM:    Constitutional: NAD, awake and alert, well-developed  Eyes:  EOMI,  Pupils round, No oral cyanosis.  HEENT: No exudate or erythema  Pulmonary: Non-labored, breath sounds are clear bilaterally, No wheezing, rales or rhonchi  Cardiovascular: Regular, S1 and S2, No murmurs, rubs, gallops oir clicks  Gastrointestinal: Bowel Sounds present, soft, nontender.   Ext: No significant LE edema with good pulses x 4  Neurological: Alert, no gross focal motor deficits  Skin: No rashes.  Psych:  Mood & affect appropriate    LABS: All Labs Reviewed:                        13.4   9.66  )-----------( 188      ( 12 Sep 2021 08:20 )             39.7                         15.1   12.37 )-----------( 212      ( 11 Sep 2021 14:04 )             44.4                         14.8   11.17 )-----------( 222      ( 11 Sep 2021 07:02 )             42.7     12 Sep 2021 08:20    137    |  105    |  16     ----------------------------<  85     3.6     |  27     |  0.78   11 Sep 2021 14:04    133    |  102    |  15     ----------------------------<  114    3.6     |  25     |  0.81   11 Sep 2021 07:02    135    |  103    |  13     ----------------------------<  132    3.6     |  23     |  0.78     Ca    7.6        12 Sep 2021 08:20  Ca    8.5        11 Sep 2021 14:04  Ca    8.5        11 Sep 2021 07:02    TPro  5.8    /  Alb  2.2    /  TBili  0.6    /  DBili  x      /  AST  30     /  ALT  16     /  AlkPhos  43     12 Sep 2021 08:20  TPro  6.8    /  Alb  2.8    /  TBili  1.5    /  DBili  x      /  AST  37     /  ALT  23     /  AlkPhos  53     11 Sep 2021 07:02  TPro  7.3    /  Alb  3.0    /  TBili  1.2    /  DBili  x      /  AST  19     /  ALT  22     /  AlkPhos  59     10 Sep 2021 07:14    PT/INR - ( 12 Sep 2021 08:20 )   PT: 16.3 sec;   INR: 1.42 ratio               Blood Culture: Organism --  Gram Stain Blood -- Gram Stain --  Specimen Source Clean Catch Clean Catch (Midstream)  Culture-Blood --    Organism --  Gram Stain Blood -- Gram Stain --  Specimen Source Clean Catch Clean Catch (Midstream)  Culture-Blood --            RADIOLOGY/EKG:    Attending Attestation:   20 minutes spent on total encounter; more than 50% of the visit was spent counseling and/or coordinating care by the attending physician.     ASSESSMENT AND PLAN [facility] Cardiology Progress Note [phone] (Dr. Cortez, Ray, Dania, Miranda)    CHIEF COMPLAINT: Patient is a 87y old  Female who presents with a chief complaint of Fall. (13 Sep 2021 06:44)      Follow Up Today: The patient denies any chest discomfort or shortness of breath.    HPI:  87y Female who presented to the ED s/p fall at home as she was making her bed. She landed on her right side unwitnessed was helped up by her son in law. She denies any LOC or head trauma. Currently complains of right hip pain, declines any other injuries. Pt is a poor historian and confused at baseline. Has a past medical history of A fib on coumadin 10 mg daily, unsure if she got a dose today will confirm with daughter. Has a pacemaker, dementia, and osteopenia. Son in law states she has a UTI and was started on cipro 2 days ago and has not finished. Daughter is the Healthcare proxy and will be coming in the afternoon to discuss surgical intervention.  (09 Sep 2021 18:18)      PAST MEDICAL & SURGICAL HISTORY:  Osteopenia    High cholesterol    Atrial fibrillation    No significant past surgical history        MEDICATIONS  (STANDING):  acetaminophen   Tablet .. 650 milliGRAM(s) Oral every 6 hours  atorvastatin 10 milliGRAM(s) Oral at bedtime  diltiazem    milliGRAM(s) Oral at bedtime  diltiazem    milliGRAM(s) Oral daily  influenza   Vaccine 0.5 milliLiter(s) IntraMuscular once  memantine 10 milliGRAM(s) Oral daily  metoprolol succinate ER 25 milliGRAM(s) Oral daily  multivitamin 1 Tablet(s) Oral daily  PARoxetine 20 milliGRAM(s) Oral daily  sodium chloride 0.9%. 1000 milliLiter(s) (100 mL/Hr) IV Continuous <Continuous>    MEDICATIONS  (PRN):  melatonin 3 milliGRAM(s) Oral at bedtime PRN Insomnia  oxyCODONE    IR 2.5 milliGRAM(s) Oral every 4 hours PRN Moderate Pain (4 - 6)  oxyCODONE    IR 5 milliGRAM(s) Oral every 4 hours PRN Severe Pain (7 - 10)  polyethylene glycol 3350 17 Gram(s) Oral at bedtime PRN Constipation  senna 2 Tablet(s) Oral at bedtime PRN Constipation  traMADol 50 milliGRAM(s) Oral every 6 hours PRN Mild Pain (1 - 3)      Allergies    No Known Allergies    Intolerances        REVIEW OF SYSTEMS:    All other review of systems is negative unless indicated above    Vital Signs Last 24 Hrs  T(C): 36.6 (13 Sep 2021 04:31), Max: 36.9 (12 Sep 2021 20:40)  T(F): 97.9 (13 Sep 2021 04:31), Max: 98.4 (12 Sep 2021 20:40)  HR: 91 (13 Sep 2021 04:31) (87 - 103)  BP: 158/89 (13 Sep 2021 04:31) (108/73 - 158/89)  BP(mean): --  RR: 17 (13 Sep 2021 04:31) (15 - 17)  SpO2: 93% (13 Sep 2021 04:31) (91% - 93%)    I&O's Summary    12 Sep 2021 07:01  -  13 Sep 2021 07:00  --------------------------------------------------------  IN: 0 mL / OUT: 900 mL / NET: -900 mL        PHYSICAL EXAM:    Constitutional: NAD, awake and alert, well-developed  Eyes:  EOMI,  Pupils round, No oral cyanosis.  HEENT: No exudate or erythema  Pulmonary: Non-labored, breath sounds are clear bilaterally, No wheezing, rales or rhonchi  Cardiovascular: Regular, S1 and S2, No murmurs  Gastrointestinal: Bowel Sounds present, soft, nontender.   Ext: No significant LE edema  Neurological: Alert, no gross focal motor deficits  Skin: No rashes.  Psych:  Mood & affect appropriate    LABS: All Labs Reviewed:                        13.4   9.66  )-----------( 188      ( 12 Sep 2021 08:20 )             39.7                         15.1   12.37 )-----------( 212      ( 11 Sep 2021 14:04 )             44.4                         14.8   11.17 )-----------( 222      ( 11 Sep 2021 07:02 )             42.7     12 Sep 2021 08:20    137    |  105    |  16     ----------------------------<  85     3.6     |  27     |  0.78   11 Sep 2021 14:04    133    |  102    |  15     ----------------------------<  114    3.6     |  25     |  0.81   11 Sep 2021 07:02    135    |  103    |  13     ----------------------------<  132    3.6     |  23     |  0.78     Ca    7.6        12 Sep 2021 08:20  Ca    8.5        11 Sep 2021 14:04  Ca    8.5        11 Sep 2021 07:02    TPro  5.8    /  Alb  2.2    /  TBili  0.6    /  DBili  x      /  AST  30     /  ALT  16     /  AlkPhos  43     12 Sep 2021 08:20  TPro  6.8    /  Alb  2.8    /  TBili  1.5    /  DBili  x      /  AST  37     /  ALT  23     /  AlkPhos  53     11 Sep 2021 07:02  TPro  7.3    /  Alb  3.0    /  TBili  1.2    /  DBili  x      /  AST  19     /  ALT  22     /  AlkPhos  59     10 Sep 2021 07:14    PT/INR - ( 12 Sep 2021 08:20 )   PT: 16.3 sec;   INR: 1.42 ratio               Blood Culture: Organism --  Gram Stain Blood -- Gram Stain --  Specimen Source Clean Catch Clean Catch (Midstream)  Culture-Blood --    Organism --  Gram Stain Blood -- Gram Stain --  Specimen Source Clean Catch Clean Catch (Midstream)  Culture-Blood --            RADIOLOGY/EKG:    Attending Attestation:   20 minutes spent on total encounter; more than 50% of the visit was spent counseling and/or coordinating care by the attending physician.     ASSESSMENT AND PLAN

## 2021-09-13 NOTE — PROGRESS NOTE ADULT - ASSESSMENT
87y Female who presented to the ED s/p fall at home as she was making her bed. She landed on her right side unwitnessed was helped up by her son in law. She denies any LOC or head trauma. Currently complains of right hip pain, declines any other injuries. Pt is a poor historian and confused at baseline. Has a past medical history of A fib on coumadin 10 mg daily, unsure if she got a dose today will confirm with daughter. Has a pacemaker, dementia, and osteopenia. Son in law states she has a UTI and was started on cipro 2 days ago and has not finished. Daughter is the Healthcare proxy and will be coming in the afternoon to discuss surgical intervention.  (09 Sep 2021 18:18)    HIP fx  Pt denies CP or SOB  EKG paced with AF  PPM interrogation in chart, no events last 90 days. battery>2.5 years    Cont metoprolol, diltiazem, statin  Pt now POD #2 Hip surgery  restarted on Coumadin POD 1  Cont Coumadin  pt sees my partner Dr Delong, EP, [phone]  Will follow here as well   87y Female who presented to the ED s/p fall at home as she was making her bed. She landed on her right side unwitnessed was helped up by her son in law. She denies any LOC or head trauma. Currently complains of right hip pain, declines any other injuries. Pt is a poor historian and confused at baseline. Has a past medical history of A fib on coumadin 10 mg daily, unsure if she got a dose today will confirm with daughter. Has a pacemaker, dementia, and osteopenia. Son in law states she has a UTI and was started on cipro 2 days ago and has not finished. Daughter is the Healthcare proxy and will be coming in the afternoon to discuss surgical intervention.  (09 Sep 2021 18:18)    HIP fx  Pt denies CP or SOB  EKG paced with AF  PPM interrogation in chart, no events last 90 days. battery>2.5 years    Cont metoprolol, diltiazem, statin  Pt now POD #2 Hip surgery  restarted on Coumadin POD 1 - INR today 1.24.  Receives 10mg coumadin as outpatient  Cont Coumadin  pt sees my partner Dr Delong, EP, [phone]  Will follow here as well

## 2021-09-13 NOTE — PROGRESS NOTE ADULT - SUBJECTIVE AND OBJECTIVE BOX
Patient interviewed and examined at bedside. Pain is controlled. Patient is feeling well and denies any headache, fevers, chest pain, shortness of breath nausea, vomiting, numbness, or tingling.      VITAL SIGNS:  T(C): 36.6 (13 Sep 2021 04:31), Max: 36.9 (12 Sep 2021 20:40)  T(F): 97.9 (13 Sep 2021 04:31), Max: 98.4 (12 Sep 2021 20:40)  HR: 91 (13 Sep 2021 04:31) (87 - 103)  BP: 158/89 (13 Sep 2021 04:31) (108/73 - 158/89)  RR: 17 (13 Sep 2021 04:31) (15 - 17)  SpO2: 93% (13 Sep 2021 04:31) (91% - 93%)        LABS:                        13.4   9.66  )-----------( 188      ( 12 Sep 2021 08:20 )             39.7     09-12    137  |  105  |  16  ----------------------------<  85  3.6   |  27  |  0.78    Ca    7.6<L>      12 Sep 2021 08:20    TPro  5.8<L>  /  Alb  2.2<L>  /  TBili  0.6  /  DBili  x   /  AST  30  /  ALT  16  /  AlkPhos  43  09-12    PT/INR - ( 12 Sep 2021 08:20 )   PT: 16.3 sec;   INR: 1.42 ratio         PTT - ( 11 Sep 2021 07:02 )  PTT:26.7 sec        Exam:    Gen: NAD, resting comfortably  RLE:  Dressing C/D/I.  +EHL/FHL/TA/GS.   SILT L2-S1.   Compartments soft and compressible.   DP pulses palpable.   No calf TTP bilaterally.

## 2021-09-13 NOTE — OCCUPATIONAL THERAPY INITIAL EVALUATION ADULT - GENERAL OBSERVATIONS, REHAB EVAL
Patient found supine in bed with +IV lock, +SCD, +bandage Right LE and +external catheter. Patient appears mildly confused though able to follow simple commands.

## 2021-09-13 NOTE — OCCUPATIONAL THERAPY INITIAL EVALUATION ADULT - RANGE OF MOTION EXAMINATION, UPPER EXTREMITY
except limitations noted R shd flexion; AROM 0-90 degrees. Fine motor skills: WFL's/bilateral UE Active ROM was WFL  (within functional limits)

## 2021-09-13 NOTE — CONSULT NOTE ADULT - SUBJECTIVE AND OBJECTIVE BOX
HPI:  86YO F PMH Dementia, Afib on coumadin, pacemaker, and osteopenia  who presented to the hospital with CC of fall at home found to have right hip fracture sp right THR 9/11/21. NO fever chills n/v/d CP SOB. Pt is poor historian due to Dementia. Per family pt was diagnosed with UTI and was started on cipro 2 days prior to admission.    Infectious Disease consult was called to evaluate pt and for antibiotic management.    Past Medical & Surgical Hx:  PAST MEDICAL & SURGICAL HISTORY:  Osteopenia  High cholesterol  Atrial fibrillation      Social History--  EtOH: denies  Tobacco: denies   Drug Use: denies    FAMILY HISTORY:  No pertinent family history in first degree relatives      Allergies  No Known Allergies    Intolerances  NONE    Home Medications:  ciprofloxacin 500 mg oral tablet: 1 tab(s) orally 2 times a day for 5 days.  Start: 9/7/21 (09 Sep 2021 16:33)  DilTIAZem (Eqv-Cardizem CD) 120 mg/24 hours oral capsule, extended release: 2 capsule(s) orally once daily in the morning and then 1 capsule orally once in the evening. (09 Sep 2021 16:33)  folic acid 1 mg oral tablet: 1 tab(s) orally once a day (09 Sep 2021 16:33)  memantine 10 mg oral tablet: 1 tab(s) orally once a day (09 Sep 2021 16:33)  multivitamin: 1 tab(s) orally once a day (09 Sep 2021 16:33)  PARoxetine 20 mg oral tablet: 1 tab(s) orally once a day (09 Sep 2021 16:33)  pravastatin 20 mg oral tablet: 1 tab(s) orally once a day (09 Sep 2021 16:33)  Toprol-XL 25 mg oral tablet, extended release: 1 tab(s) orally once a day (09 Sep 2021 16:33)  warfarin 10 mg oral tablet: 1 tab(s) orally once a day (09 Sep 2021 16:33)    Current Inpatient Medications :    ANTIBIOTICS:       OTHER RELEVANT MEDICATIONS :  acetaminophen   Tablet .. 650 milliGRAM(s) Oral every 6 hours  atorvastatin 10 milliGRAM(s) Oral at bedtime  diltiazem    milliGRAM(s) Oral at bedtime  diltiazem    milliGRAM(s) Oral daily  influenza   Vaccine 0.5 milliLiter(s) IntraMuscular once  melatonin 3 milliGRAM(s) Oral at bedtime PRN  memantine 10 milliGRAM(s) Oral daily  metoprolol succinate ER 25 milliGRAM(s) Oral daily  multivitamin 1 Tablet(s) Oral daily  oxyCODONE    IR 2.5 milliGRAM(s) Oral every 4 hours PRN  oxyCODONE    IR 5 milliGRAM(s) Oral every 4 hours PRN  PARoxetine 20 milliGRAM(s) Oral daily  polyethylene glycol 3350 17 Gram(s) Oral at bedtime PRN  senna 2 Tablet(s) Oral at bedtime PRN  sodium chloride 0.9%. 1000 milliLiter(s) IV Continuous <Continuous>  traMADol 50 milliGRAM(s) Oral every 6 hours PRN      ROS:  Unable to obtain due to : Dementia      I&O's Detail    12 Sep 2021 07:01  -  13 Sep 2021 07:00  --------------------------------------------------------  IN:  Total IN: 0 mL    OUT:    Voided (mL): 900 mL  Total OUT: 900 mL    Total NET: -900 mL      Physical Exam:  Vital Signs Last 24 Hrs  T(C): 36.6 (13 Sep 2021 20:07), Max: 36.6 (13 Sep 2021 04:31)  T(F): 97.9 (13 Sep 2021 20:07), Max: 97.9 (13 Sep 2021 04:31)  HR: 100 (13 Sep 2021 21:29) (72 - 100)  BP: 141/92 (13 Sep 2021 21:29) (95/68 - 158/89)  RR: 19 (13 Sep 2021 21:29) (16 - 19)  SpO2: 92% (13 Sep 2021 21:29) (91% - 93%)      General:  no acute distress  Neck: supple, trachea midline  Lungs: clear, no wheeze/rhonchi  Cardiovascular: regular rate and rhythm, S1 S2  Abdomen: soft, nontender, ND, bowel sounds normal  Neurological:  alert confused  Skin: no rash  Extremities: right hip drsg c/d/i    Labs:                         13.1   10.48 )-----------( 201      ( 13 Sep 2021 08:02 )             39.9   09-13    136  |  102  |  18  ----------------------------<  94  3.4<L>   |  24  |  0.76    Ca    8.0<L>      13 Sep 2021 08:02    TPro  6.8  /  Alb  2.7<L>  /  TBili  1.1  /  DBili  x   /  AST  35  /  ALT  16  /  AlkPhos  52  09-13      RECENT CULTURES:  Culture - Urine (09.10.21 @ 12:00)    Specimen Source: Clean Catch Clean Catch (Midstream)    Culture Results:   <10,000 CFU/mL Normal Urogenital Kristi      RADIOLOGY & ADDITIONAL STUDIES:    EXAM:  CT 3D RECONSTRUCT PERRY IRIZARRY                          EXAM:  CT PELVIS BONY ONLY                            PROCEDURE DATE:  09/09/2021          INTERPRETATION:  EXAMINATION: CT of the pelvis without contrast    CLINICAL INFORMATION: Pelvic trauma    TECHNIQUE: Axial CT images were obtained through the pelvis. Coronal and sagittal reformatted images were made. 3-D reconstruction images were also performed.    FINDINGS: There is an acute minimally displaced right transverse cervical femoral neck fracture. There are no advanced arthritic changes at the hips. There is lower lumbar spondylosis. There are mild degenerative changes at the sacroiliac joints and pubic symphysis. The bones are diffusely demineralized.    There is diverticulosis. There is moderate amount stool in the colon and rectum. There are vascular calcifications.    The 3-D reconstruction images confirm the acute right transcervical femoral neck fracture.    IMPRESSION: Acute minimally displaced right transverse femoral neck fracture.      Assessment :   86YO F PMH Dementia, Afib on coumadin, pacemaker, and osteopenia admitted with right hip fracture sp right THR 9/11/21.  Per family pt was diagnosed with UTI and was started on cipro 2 days prior to admission.  Pt was on Rocephin on admission till 9/11.  UA neg and ucx ngtd  Overall clinically stable    Plan :   No further antibiotics indicated  Trend temps and cbc  Activity per ortho  Pulm toileting  Strict asp precautions    D/w Dr Hancock      Continue with present regiment .  Approptiate use of antibiotics and adverse effects reviewed.      > 45 minutes spent in direct patient care reviewing  the notes, lab data/ imaging , discussion with multidisciplinary team. All questions were addressed and answered to the best of my capacity .    Thank you for allowing me to participate in the care of your patient .      Mushtaq Griggs MD  Infectious Disease  [phone]

## 2021-09-13 NOTE — PROGRESS NOTE ADULT - ASSESSMENT
Assessment:  87F POD2  s/p R Hip CRPP.     Plan:  Follow up AM labs.  WBAT RLE.   Pain management PRN.  PT/OT.   Continue PPx antibiotics.   DVT PPx: home dose coumadin per cards recommendations.   Incentive spirometry.   Discussed the above with attending who agrees with plan.

## 2021-09-13 NOTE — PROGRESS NOTE ADULT - SUBJECTIVE AND OBJECTIVE BOX
Patient is a 87y old  Female who presents with a chief complaint of fall (13 Sep 2021 07:02)    Date of servie : 09-13-21 @ 13:43  INTERVAL HPI/OVERNIGHT EVENTS:  T(C): 36.4 (09-13-21 @ 12:51), Max: 36.9 (09-12-21 @ 20:40)  HR: 72 (09-13-21 @ 12:51) (72 - 103)  BP: 95/68 (09-13-21 @ 12:51) (95/68 - 158/89)  RR: 17 (09-13-21 @ 12:51) (16 - 17)  SpO2: 92% (09-13-21 @ 12:51) (92% - 93%)  Wt(kg): --  I&O's Summary    12 Sep 2021 07:01  -  13 Sep 2021 07:00  --------------------------------------------------------  IN: 0 mL / OUT: 900 mL / NET: -900 mL        LABS:                        13.1   10.48 )-----------( 201      ( 13 Sep 2021 08:02 )             39.9     09-13    136  |  102  |  18  ----------------------------<  94  3.4<L>   |  24  |  0.76    Ca    8.0<L>      13 Sep 2021 08:02    TPro  6.8  /  Alb  2.7<L>  /  TBili  1.1  /  DBili  x   /  AST  35  /  ALT  16  /  AlkPhos  52  09-13    PT/INR - ( 13 Sep 2021 08:02 )   PT: 14.4 sec;   INR: 1.24 ratio             CAPILLARY BLOOD GLUCOSE                MEDICATIONS  (STANDING):  acetaminophen   Tablet .. 650 milliGRAM(s) Oral every 6 hours  atorvastatin 10 milliGRAM(s) Oral at bedtime  diltiazem    milliGRAM(s) Oral at bedtime  diltiazem    milliGRAM(s) Oral daily  influenza   Vaccine 0.5 milliLiter(s) IntraMuscular once  memantine 10 milliGRAM(s) Oral daily  metoprolol succinate ER 25 milliGRAM(s) Oral daily  multivitamin 1 Tablet(s) Oral daily  PARoxetine 20 milliGRAM(s) Oral daily  sodium chloride 0.9%. 1000 milliLiter(s) (100 mL/Hr) IV Continuous <Continuous>  warfarin 5 milliGRAM(s) Oral once    MEDICATIONS  (PRN):  melatonin 3 milliGRAM(s) Oral at bedtime PRN Insomnia  oxyCODONE    IR 2.5 milliGRAM(s) Oral every 4 hours PRN Moderate Pain (4 - 6)  oxyCODONE    IR 5 milliGRAM(s) Oral every 4 hours PRN Severe Pain (7 - 10)  polyethylene glycol 3350 17 Gram(s) Oral at bedtime PRN Constipation  senna 2 Tablet(s) Oral at bedtime PRN Constipation  traMADol 50 milliGRAM(s) Oral every 6 hours PRN Mild Pain (1 - 3)          PHYSICAL EXAM:  GENERAL: NAD, well-groomed, well-developed  HEAD:  Atraumatic, Normocephalic  CHEST/LUNG: Clear to percussion bilaterally; No rales, rhonchi, wheezing, or rubs  HEART: Regular rate and rhythm; No murmurs, rubs, or gallops  ABDOMEN: Soft, Nontender, Nondistended; Bowel sounds present  EXTREMITIES:  2+ Peripheral Pulses, No clubbing, cyanosis, or edema  LYMPH: No lymphadenopathy noted  SKIN: No rashes or lesions    Care Discussed with Consultants/Other Providers [ ] YES  [ ] NO

## 2021-09-13 NOTE — OCCUPATIONAL THERAPY INITIAL EVALUATION ADULT - ADDITIONAL COMMENTS
Patient lives in a house with her daughter and son-in-law. Patient ambulated using a rolling walker. Patient reports that she lives in a house with her daughter and has 4 steps with handrail to enter her house. Patient has a bathtub with sliding doors and a grab bar. As per EMR, patient received assistance at home with ADL's and ambulated using a rolling walker. Patient performed sit to stand and ambulated from bed to bedside chair using RW with mod assist. Pt requires assistance with ADL's and transfers due to decreased strength, decreased endurance and impaired sitting/standing balance.

## 2021-09-14 LAB
ALBUMIN SERPL ELPH-MCNC: 2.2 G/DL — LOW (ref 3.3–5)
ALP SERPL-CCNC: 48 U/L — SIGNIFICANT CHANGE UP (ref 40–120)
ALT FLD-CCNC: 14 U/L — SIGNIFICANT CHANGE UP (ref 12–78)
ANION GAP SERPL CALC-SCNC: 5 MMOL/L — SIGNIFICANT CHANGE UP (ref 5–17)
AST SERPL-CCNC: 24 U/L — SIGNIFICANT CHANGE UP (ref 15–37)
BILIRUB SERPL-MCNC: 0.8 MG/DL — SIGNIFICANT CHANGE UP (ref 0.2–1.2)
BUN SERPL-MCNC: 19 MG/DL — SIGNIFICANT CHANGE UP (ref 7–23)
CALCIUM SERPL-MCNC: 7.7 MG/DL — LOW (ref 8.5–10.1)
CHLORIDE SERPL-SCNC: 105 MMOL/L — SIGNIFICANT CHANGE UP (ref 96–108)
CO2 SERPL-SCNC: 27 MMOL/L — SIGNIFICANT CHANGE UP (ref 22–31)
CREAT SERPL-MCNC: 0.74 MG/DL — SIGNIFICANT CHANGE UP (ref 0.5–1.3)
GLUCOSE SERPL-MCNC: 88 MG/DL — SIGNIFICANT CHANGE UP (ref 70–99)
HCT VFR BLD CALC: 35.6 % — SIGNIFICANT CHANGE UP (ref 34.5–45)
HGB BLD-MCNC: 12.1 G/DL — SIGNIFICANT CHANGE UP (ref 11.5–15.5)
INR BLD: 1.29 RATIO — HIGH (ref 0.88–1.16)
MCHC RBC-ENTMCNC: 31.8 PG — SIGNIFICANT CHANGE UP (ref 27–34)
MCHC RBC-ENTMCNC: 34 GM/DL — SIGNIFICANT CHANGE UP (ref 32–36)
MCV RBC AUTO: 93.7 FL — SIGNIFICANT CHANGE UP (ref 80–100)
NRBC # BLD: 0 /100 WBCS — SIGNIFICANT CHANGE UP (ref 0–0)
PLATELET # BLD AUTO: 172 K/UL — SIGNIFICANT CHANGE UP (ref 150–400)
POTASSIUM SERPL-MCNC: 3.6 MMOL/L — SIGNIFICANT CHANGE UP (ref 3.5–5.3)
POTASSIUM SERPL-SCNC: 3.6 MMOL/L — SIGNIFICANT CHANGE UP (ref 3.5–5.3)
PROT SERPL-MCNC: 5.8 G/DL — LOW (ref 6–8.3)
PROTHROM AB SERPL-ACNC: 14.9 SEC — HIGH (ref 10.6–13.6)
RBC # BLD: 3.8 M/UL — SIGNIFICANT CHANGE UP (ref 3.8–5.2)
RBC # FLD: 14 % — SIGNIFICANT CHANGE UP (ref 10.3–14.5)
SODIUM SERPL-SCNC: 137 MMOL/L — SIGNIFICANT CHANGE UP (ref 135–145)
WBC # BLD: 7.49 K/UL — SIGNIFICANT CHANGE UP (ref 3.8–10.5)
WBC # FLD AUTO: 7.49 K/UL — SIGNIFICANT CHANGE UP (ref 3.8–10.5)

## 2021-09-14 RX ORDER — ENOXAPARIN SODIUM 100 MG/ML
55 INJECTION SUBCUTANEOUS
Refills: 0 | Status: DISCONTINUED | OUTPATIENT
Start: 2021-09-14 | End: 2021-09-15

## 2021-09-14 RX ORDER — WARFARIN SODIUM 2.5 MG/1
8 TABLET ORAL ONCE
Refills: 0 | Status: COMPLETED | OUTPATIENT
Start: 2021-09-14 | End: 2021-09-14

## 2021-09-14 RX ADMIN — Medication 120 MILLIGRAM(S): at 21:43

## 2021-09-14 RX ADMIN — MEMANTINE HYDROCHLORIDE 10 MILLIGRAM(S): 10 TABLET ORAL at 11:26

## 2021-09-14 RX ADMIN — Medication 240 MILLIGRAM(S): at 06:17

## 2021-09-14 RX ADMIN — WARFARIN SODIUM 8 MILLIGRAM(S): 2.5 TABLET ORAL at 21:43

## 2021-09-14 RX ADMIN — Medication 20 MILLIGRAM(S): at 11:26

## 2021-09-14 RX ADMIN — ENOXAPARIN SODIUM 55 MILLIGRAM(S): 100 INJECTION SUBCUTANEOUS at 18:07

## 2021-09-14 RX ADMIN — ATORVASTATIN CALCIUM 10 MILLIGRAM(S): 80 TABLET, FILM COATED ORAL at 21:44

## 2021-09-14 RX ADMIN — Medication 650 MILLIGRAM(S): at 18:07

## 2021-09-14 RX ADMIN — Medication 650 MILLIGRAM(S): at 11:26

## 2021-09-14 RX ADMIN — Medication 1 TABLET(S): at 11:26

## 2021-09-14 RX ADMIN — Medication 650 MILLIGRAM(S): at 12:26

## 2021-09-14 RX ADMIN — Medication 25 MILLIGRAM(S): at 06:17

## 2021-09-14 RX ADMIN — Medication 650 MILLIGRAM(S): at 06:16

## 2021-09-14 NOTE — PROGRESS NOTE ADULT - ASSESSMENT
Assessment:  87F POD3 s/p R Hip CRPP.     Plan:  Follow up AM labs.  WBAT RLE.   Pain management PRN.  PT/OT.   Continue PPx antibiotics.   DVT PPx: home dose coumadin per cards recommendations.   Incentive spirometry.   Discussed the above with attending who agrees with plan.  Assessment:  87F POD3 s/p R Hip CRPP.     Plan:  Follow up AM labs.  WBAT RLE.   Pain management PRN.  PT/OT.   Continue PPx antibiotics.   DVT PPx: home dose coumadin per cards recommendations.   Incentive spirometry.   Ortho stable for d/c to rehab  FU outpatient  Discussed the above with attending who agrees with plan.

## 2021-09-14 NOTE — PROGRESS NOTE ADULT - ASSESSMENT
1 Femur fracture  - spp  OR   - pain control  - ortho fu  - will monitor     2 Afib  - check inr and coumadin daily   - started Lovenox bridge    - cards fu      3 UTI  - finished antibiotics  - id fu appreciated

## 2021-09-14 NOTE — PROGRESS NOTE ADULT - SUBJECTIVE AND OBJECTIVE BOX
Patient is a 87y old  Female who presents with a chief complaint of fall (14 Sep 2021 10:32)    Date of servie : 09-14-21 @ 15:35  INTERVAL HPI/OVERNIGHT EVENTS:  T(C): 36.4 (09-14-21 @ 12:46), Max: 36.6 (09-13-21 @ 20:07)  HR: 79 (09-14-21 @ 12:46) (75 - 100)  BP: 104/70 (09-14-21 @ 12:46) (104/70 - 150/95)  RR: 18 (09-14-21 @ 12:46) (18 - 19)  SpO2: 94% (09-14-21 @ 12:46) (91% - 94%)  Wt(kg): --  I&O's Summary    13 Sep 2021 07:01  -  14 Sep 2021 07:00  --------------------------------------------------------  IN: 0 mL / OUT: 150 mL / NET: -150 mL        LABS:                        12.1   7.49  )-----------( 172      ( 14 Sep 2021 07:15 )             35.6     09-14    137  |  105  |  19  ----------------------------<  88  3.6   |  27  |  0.74    Ca    7.7<L>      14 Sep 2021 07:15    TPro  5.8<L>  /  Alb  2.2<L>  /  TBili  0.8  /  DBili  x   /  AST  24  /  ALT  14  /  AlkPhos  48  09-14    PT/INR - ( 14 Sep 2021 07:15 )   PT: 14.9 sec;   INR: 1.29 ratio             CAPILLARY BLOOD GLUCOSE                MEDICATIONS  (STANDING):  acetaminophen   Tablet .. 650 milliGRAM(s) Oral every 6 hours  atorvastatin 10 milliGRAM(s) Oral at bedtime  diltiazem    milliGRAM(s) Oral at bedtime  diltiazem    milliGRAM(s) Oral daily  enoxaparin Injectable 55 milliGRAM(s) SubCutaneous two times a day  influenza   Vaccine 0.5 milliLiter(s) IntraMuscular once  memantine 10 milliGRAM(s) Oral daily  metoprolol succinate ER 25 milliGRAM(s) Oral daily  multivitamin 1 Tablet(s) Oral daily  PARoxetine 20 milliGRAM(s) Oral daily  sodium chloride 0.9%. 1000 milliLiter(s) (100 mL/Hr) IV Continuous <Continuous>  warfarin 8 milliGRAM(s) Oral once    MEDICATIONS  (PRN):  melatonin 3 milliGRAM(s) Oral at bedtime PRN Insomnia  oxyCODONE    IR 2.5 milliGRAM(s) Oral every 4 hours PRN Moderate Pain (4 - 6)  oxyCODONE    IR 5 milliGRAM(s) Oral every 4 hours PRN Severe Pain (7 - 10)  polyethylene glycol 3350 17 Gram(s) Oral at bedtime PRN Constipation  senna 2 Tablet(s) Oral at bedtime PRN Constipation  traMADol 50 milliGRAM(s) Oral every 6 hours PRN Mild Pain (1 - 3)          PHYSICAL EXAM:  GENERAL: NAD, well-groomed, well-developed  HEAD:  Atraumatic, Normocephalic  CHEST/LUNG: Clear to percussion bilaterally; No rales, rhonchi, wheezing, or rubs  HEART: Regular rate and rhythm; No murmurs, rubs, or gallops  ABDOMEN: Soft, Nontender, Nondistended; Bowel sounds present  EXTREMITIES:  2+ Peripheral Pulses, No clubbing, cyanosis, or edema  LYMPH: No lymphadenopathy noted  SKIN: No rashes or lesions    Care Discussed with Consultants/Other Providers [ ] YES  [ ] NO

## 2021-09-14 NOTE — PROGRESS NOTE ADULT - SUBJECTIVE AND OBJECTIVE BOX
[facility] [place] [facility] Progress Note [phone] (Dr. Cortez, Ray, Dania, Miranda)    CHIEF COMPLAINT: Patient is a 87y old  Female who presents with a chief complaint of Fall. (14 Sep 2021 07:11)      Follow Up Today: The patient denies any chest discomfort or shortness of breath.    HPI:  87y Female who presented to the ED s/p fall at home as she was making her bed. She landed on her right side unwitnessed was helped up by her son in law. She denies any LOC or head trauma. Currently complains of right hip pain, declines any other injuries. Pt is a poor historian and confused at baseline. Has a past medical history of A fib on coumadin 10 mg daily, unsure if she got a dose today will confirm with daughter. Has a pacemaker, dementia, and osteopenia. Son in law states she has a UTI and was started on cipro 2 days ago and has not finished. Daughter is the Healthcare proxy and will be coming in the afternoon to discuss surgical intervention.  (09 Sep 2021 18:18)      PAST MEDICAL & SURGICAL HISTORY:  Osteopenia    High cholesterol    Atrial fibrillation    No significant past surgical history        MEDICATIONS  (STANDING):  acetaminophen   Tablet .. 650 milliGRAM(s) Oral every 6 hours  atorvastatin 10 milliGRAM(s) Oral at bedtime  diltiazem    milliGRAM(s) Oral at bedtime  diltiazem    milliGRAM(s) Oral daily  enoxaparin Injectable 55 milliGRAM(s) SubCutaneous two times a day  influenza   Vaccine 0.5 milliLiter(s) IntraMuscular once  memantine 10 milliGRAM(s) Oral daily  metoprolol succinate ER 25 milliGRAM(s) Oral daily  multivitamin 1 Tablet(s) Oral daily  PARoxetine 20 milliGRAM(s) Oral daily  sodium chloride 0.9%. 1000 milliLiter(s) (100 mL/Hr) IV Continuous <Continuous>  warfarin 8 milliGRAM(s) Oral once    MEDICATIONS  (PRN):  melatonin 3 milliGRAM(s) Oral at bedtime PRN Insomnia  oxyCODONE    IR 2.5 milliGRAM(s) Oral every 4 hours PRN Moderate Pain (4 - 6)  oxyCODONE    IR 5 milliGRAM(s) Oral every 4 hours PRN Severe Pain (7 - 10)  polyethylene glycol 3350 17 Gram(s) Oral at bedtime PRN Constipation  senna 2 Tablet(s) Oral at bedtime PRN Constipation  traMADol 50 milliGRAM(s) Oral every 6 hours PRN Mild Pain (1 - 3)      Allergies    No Known Allergies    Intolerances        REVIEW OF SYSTEMS:    All other review of systems is negative unless indicated above    Vital Signs Last 24 Hrs  T(C): 36.4 (14 Sep 2021 06:14), Max: 36.6 (13 Sep 2021 20:07)  T(F): 97.6 (14 Sep 2021 06:14), Max: 97.9 (13 Sep 2021 20:07)  HR: 94 (14 Sep 2021 06:14) (72 - 100)  BP: 150/95 (14 Sep 2021 06:14) (95/68 - 150/95)  BP(mean): --  RR: 18 (14 Sep 2021 06:14) (17 - 19)  SpO2: 92% (14 Sep 2021 06:14) (91% - 92%)    I&O's Summary    13 Sep 2021 07:01  -  14 Sep 2021 07:00  --------------------------------------------------------  IN: 0 mL / OUT: 150 mL / NET: -150 mL        PHYSICAL EXAM:    Constitutional: NAD, awake and alert, well-developed  Eyes:  EOMI,  Pupils round, No oral cyanosis.  HEENT: No exudate or erythema  Pulmonary: Non-labored, breath sounds are clear bilaterally, No wheezing, rales or rhonchi  Cardiovascular: Regular, S1 and S2, No murmurs, rubs, gallops oir clicks  Gastrointestinal: Bowel Sounds present, soft, nontender.   Ext: No significant LE edema with good pulses x 4  Neurological: Alert, no gross focal motor deficits  Skin: No rashes.  Psych:  Mood & affect appropriate    LABS: All Labs Reviewed:                        12.1   7.49  )-----------( 172      ( 14 Sep 2021 07:15 )             35.6                         13.1   10.48 )-----------( 201      ( 13 Sep 2021 08:02 )             39.9                         13.4   9.66  )-----------( 188      ( 12 Sep 2021 08:20 )             39.7     14 Sep 2021 07:15    137    |  105    |  19     ----------------------------<  88     3.6     |  27     |  0.74   13 Sep 2021 08:02    136    |  102    |  18     ----------------------------<  94     3.4     |  24     |  0.76   12 Sep 2021 08:20    137    |  105    |  16     ----------------------------<  85     3.6     |  27     |  0.78     Ca    7.7        14 Sep 2021 07:15  Ca    8.0        13 Sep 2021 08:02  Ca    7.6        12 Sep 2021 08:20    TPro  5.8    /  Alb  2.2    /  TBili  0.8    /  DBili  x      /  AST  24     /  ALT  14     /  AlkPhos  48     14 Sep 2021 07:15  TPro  6.8    /  Alb  2.7    /  TBili  1.1    /  DBili  x      /  AST  35     /  ALT  16     /  AlkPhos  52     13 Sep 2021 08:02  TPro  5.8    /  Alb  2.2    /  TBili  0.6    /  DBili  x      /  AST  30     /  ALT  16     /  AlkPhos  43     12 Sep 2021 08:20    PT/INR - ( 14 Sep 2021 07:15 )   PT: 14.9 sec;   INR: 1.29 ratio               Blood Culture: Organism --  Gram Stain Blood -- Gram Stain --  Specimen Source Clean Catch Clean Catch (Midstream)  Culture-Blood --    Organism --  Gram Stain Blood -- Gram Stain --  Specimen Source Clean Catch Clean Catch (Midstream)  Culture-Blood --            RADIOLOGY/EKG:    Attending Attestation:   20 minutes spent on total encounter; more than 50% of the visit was spent counseling and/or coordinating care by the attending physician.     ASSESSMENT AND PLAN

## 2021-09-14 NOTE — PROGRESS NOTE ADULT - ASSESSMENT
87y Female who presented to the ED s/p fall at home as she was making her bed. She landed on her right side unwitnessed was helped up by her son in law. She denies any LOC or head trauma. Currently complains of right hip pain, declines any other injuries. Pt is a poor historian and confused at baseline. Has a past medical history of A fib on coumadin 10 mg daily, unsure if she got a dose today will confirm with daughter. Has a pacemaker, dementia, and osteopenia. Son in law states she has a UTI and was started on cipro 2 days ago and has not finished. Daughter is the Healthcare proxy and will be coming in the afternoon to discuss surgical intervention.  (09 Sep 2021 18:18)    HIP fx  Pt denies CP or SOB  EKG paced with AF  PPM interrogation in chart, no events last 90 days. battery>2.5 years    Cont metoprolol, diltiazem, statin  Pt now POD #4 Hip surgery  restarted on Coumadin POD 3 - INR today 1.29.  Receives 10mg coumadin as outpatient  Cont Coumadin  pt sees my partner Dr Delong, EP, [phone]  Will follow here as well

## 2021-09-14 NOTE — PROGRESS NOTE ADULT - SUBJECTIVE AND OBJECTIVE BOX
Patient interviewed and examined at bedside. Pain is controlled. Patient is feeling well and denies any headache, fevers, chest pain, shortness of breath nausea, vomiting, numbness, or tingling. No other orthopaedic complaints at present.       VITAL SIGNS:  T(C): 36.4 (14 Sep 2021 06:14), Max: 36.6 (13 Sep 2021 20:07)  T(F): 97.6 (14 Sep 2021 06:14), Max: 97.9 (13 Sep 2021 20:07)  HR: 94 (14 Sep 2021 06:14) (72 - 100)  BP: 150/95 (14 Sep 2021 06:14) (95/68 - 150/95)  RR: 18 (14 Sep 2021 06:14) (17 - 19)  SpO2: 92% (14 Sep 2021 06:14) (91% - 93%)        LABS:                          13.1   10.48 )-----------( 201      ( 13 Sep 2021 08:02 )             39.9     09-13    136  |  102  |  18  ----------------------------<  94  3.4<L>   |  24  |  0.76    Ca    8.0<L>      13 Sep 2021 08:02    TPro  6.8  /  Alb  2.7<L>  /  TBili  1.1  /  DBili  x   /  AST  35  /  ALT  16  /  AlkPhos  52  09-13    PT/INR - ( 13 Sep 2021 08:02 )   PT: 14.4 sec;   INR: 1.24 ratio           Exam:    Gen: NAD, resting comfortably  RLE:  Dressing C/D/I.  +EHL/FHL/TA/GS.   SILT L2-S1.   Compartments soft and compressible.   DP pulses palpable.   No calf TTP bilaterally.

## 2021-09-14 NOTE — PROGRESS NOTE ADULT - SUBJECTIVE AND OBJECTIVE BOX
SADIA CRUZ is a 87yFemale , patient examined and chart reviewed.    INTERVAL HPI/ OVERNIGHT EVENTS:   Afebrile. No events. In chair.    PAST MEDICAL & SURGICAL HISTORY:  Osteopenia  High cholesterol  Atrial fibrillation      For details regarding the patient's social history, family history, and other miscellaneous elements, please refer the initial infectious diseases consultation and/or the admitting history and physical examination for this admission.    ROS:  Unable to obtain due to : Dementia         Current inpatient medications :    ANTIBIOTICS/RELEVANT:      acetaminophen   Tablet .. 650 milliGRAM(s) Oral every 6 hours  atorvastatin 10 milliGRAM(s) Oral at bedtime  diltiazem    milliGRAM(s) Oral at bedtime  diltiazem    milliGRAM(s) Oral daily  enoxaparin Injectable 55 milliGRAM(s) SubCutaneous two times a day  melatonin 3 milliGRAM(s) Oral at bedtime PRN  memantine 10 milliGRAM(s) Oral daily  metoprolol succinate ER 25 milliGRAM(s) Oral daily  multivitamin 1 Tablet(s) Oral daily  oxyCODONE    IR 2.5 milliGRAM(s) Oral every 4 hours PRN  oxyCODONE    IR 5 milliGRAM(s) Oral every 4 hours PRN  PARoxetine 20 milliGRAM(s) Oral daily  polyethylene glycol 3350 17 Gram(s) Oral at bedtime PRN  senna 2 Tablet(s) Oral at bedtime PRN  sodium chloride 0.9%. 1000 milliLiter(s) IV Continuous <Continuous>  traMADol 50 milliGRAM(s) Oral every 6 hours PRN      Objective:    09-13 @ 07:01 - 09-14 @ 07:00  --------------------------------------------------------  IN: 0 mL / OUT: 150 mL / NET: -150 mL    09-14 @ 07:01 - 09-14 @ 21:49  --------------------------------------------------------  IN: 0 mL / OUT: 800 mL / NET: -800 mL      T(C): 36.9 (09-14-21 @ 19:40), Max: 36.9 (09-14-21 @ 19:40)  HR: 67 (09-14-21 @ 21:41) (67 - 94)  BP: 136/78 (09-14-21 @ 21:41) (104/70 - 150/95)  RR: 17 (09-14-21 @ 19:40) (17 - 18)  SpO2: 95% (09-14-21 @ 19:40) (92% - 95%)      Physical Exam:  General:  no acute distress  Neck: supple, trachea midline  Lungs: clear, no wheeze/rhonchi  Cardiovascular: regular rate and rhythm, S1 S2  Abdomen: soft, nontender,  bowel sounds normal  Neurological: awake confused  Skin: no rash  Extremities: Right hip drsg c/d/i            LABS:                          12.1   7.49  )-----------( 172      ( 14 Sep 2021 07:15 )             35.6       09-14    137  |  105  |  19  ----------------------------<  88  3.6   |  27  |  0.74    Ca    7.7<L>      14 Sep 2021 07:15    TPro  5.8<L>  /  Alb  2.2<L>  /  TBili  0.8  /  DBili  x   /  AST  24  /  ALT  14  /  AlkPhos  48  09-14      PT/INR - ( 14 Sep 2021 07:15 )   PT: 14.9 sec;   INR: 1.29 ratio         MICROBIOLOGY:  Culture - Urine (09.10.21 @ 12:00)    Specimen Source: Clean Catch Clean Catch (Midstream)    Culture Results:   <10,000 CFU/mL Normal Urogenital Kristi      RADIOLOGY & ADDITIONAL STUDIES:    EXAM:  CT 3D RECONSTRUCT PERRY IRIZARRY                          EXAM:  CT PELVIS BONY ONLY                            PROCEDURE DATE:  09/09/2021          INTERPRETATION:  EXAMINATION: CT of the pelvis without contrast    CLINICAL INFORMATION: Pelvic trauma    TECHNIQUE: Axial CT images were obtained through the pelvis. Coronal and sagittal reformatted images were made. 3-D reconstruction images were also performed.    FINDINGS: There is an acute minimally displaced right transverse cervical femoral neck fracture. There are no advanced arthritic changes at the hips. There is lower lumbar spondylosis. There are mild degenerative changes at the sacroiliac joints and pubic symphysis. The bones are diffusely demineralized.    There is diverticulosis. There is moderate amount stool in the colon and rectum. There are vascular calcifications.    The 3-D reconstruction images confirm the acute right transcervical femoral neck fracture.    IMPRESSION: Acute minimally displaced right transverse femoral neck fracture.      Assessment :   86YO F PMH Dementia, Afib on coumadin, pacemaker, and osteopenia admitted with right hip fracture sp right THR 9/11/21.  Per family pt was diagnosed with UTI and was started on cipro 2 days prior to admission.  Pt was on Rocephin on admission till 9/11.  UA neg and ucx ngtd  Overall clinically stable    Plan :   Monitor off antibiotics  Trend temps and cbc  Activity per ortho  Pulm toileting  Strict asp precautions    D/w Dr aHncock    Continue with present regiment.  Appropriate use of antibiotics and adverse effects reviewed.      I have discussed the above plan of care with patient/ family in detail. They expressed understanding of the  treatment plan . Risks, benefits and alternatives discussed in detail. I have asked if they have any questions or concerns and appropriately addressed them to the best of my ability .    > 35 minutes were spent in direct patient care reviewing notes, medications ,labs data/ imaging , discussion with multidisciplinary team.    Thank you for allowing me to participate in care of your patient .    Mushtaq Griggs MD  Infectious Disease  [phone]

## 2021-09-15 ENCOUNTER — TRANSCRIPTION ENCOUNTER (OUTPATIENT)
Age: 86
End: 2021-09-15

## 2021-09-15 VITALS
SYSTOLIC BLOOD PRESSURE: 135 MMHG | RESPIRATION RATE: 18 BRPM | OXYGEN SATURATION: 92 % | DIASTOLIC BLOOD PRESSURE: 71 MMHG | TEMPERATURE: 98 F | HEART RATE: 80 BPM

## 2021-09-15 LAB
ALBUMIN SERPL ELPH-MCNC: 2.2 G/DL — LOW (ref 3.3–5)
ALP SERPL-CCNC: 49 U/L — SIGNIFICANT CHANGE UP (ref 40–120)
ALT FLD-CCNC: 13 U/L — SIGNIFICANT CHANGE UP (ref 12–78)
ANION GAP SERPL CALC-SCNC: 7 MMOL/L — SIGNIFICANT CHANGE UP (ref 5–17)
APTT BLD: 32.9 SEC — SIGNIFICANT CHANGE UP (ref 27.5–35.5)
AST SERPL-CCNC: 22 U/L — SIGNIFICANT CHANGE UP (ref 15–37)
BILIRUB SERPL-MCNC: 0.9 MG/DL — SIGNIFICANT CHANGE UP (ref 0.2–1.2)
BUN SERPL-MCNC: 17 MG/DL — SIGNIFICANT CHANGE UP (ref 7–23)
CALCIUM SERPL-MCNC: 7.8 MG/DL — LOW (ref 8.5–10.1)
CHLORIDE SERPL-SCNC: 105 MMOL/L — SIGNIFICANT CHANGE UP (ref 96–108)
CO2 SERPL-SCNC: 26 MMOL/L — SIGNIFICANT CHANGE UP (ref 22–31)
CREAT SERPL-MCNC: 0.66 MG/DL — SIGNIFICANT CHANGE UP (ref 0.5–1.3)
GLUCOSE SERPL-MCNC: 82 MG/DL — SIGNIFICANT CHANGE UP (ref 70–99)
HCT VFR BLD CALC: 34.8 % — SIGNIFICANT CHANGE UP (ref 34.5–45)
HGB BLD-MCNC: 11.8 G/DL — SIGNIFICANT CHANGE UP (ref 11.5–15.5)
INR BLD: 1.4 RATIO — HIGH (ref 0.88–1.16)
MCHC RBC-ENTMCNC: 31.6 PG — SIGNIFICANT CHANGE UP (ref 27–34)
MCHC RBC-ENTMCNC: 33.9 GM/DL — SIGNIFICANT CHANGE UP (ref 32–36)
MCV RBC AUTO: 93.3 FL — SIGNIFICANT CHANGE UP (ref 80–100)
NRBC # BLD: 0 /100 WBCS — SIGNIFICANT CHANGE UP (ref 0–0)
PLATELET # BLD AUTO: 199 K/UL — SIGNIFICANT CHANGE UP (ref 150–400)
POTASSIUM SERPL-MCNC: 3.3 MMOL/L — LOW (ref 3.5–5.3)
POTASSIUM SERPL-SCNC: 3.3 MMOL/L — LOW (ref 3.5–5.3)
PROT SERPL-MCNC: 5.9 G/DL — LOW (ref 6–8.3)
PROTHROM AB SERPL-ACNC: 16.1 SEC — HIGH (ref 10.6–13.6)
RBC # BLD: 3.73 M/UL — LOW (ref 3.8–5.2)
RBC # FLD: 14.1 % — SIGNIFICANT CHANGE UP (ref 10.3–14.5)
SARS-COV-2 RNA SPEC QL NAA+PROBE: SIGNIFICANT CHANGE UP
SODIUM SERPL-SCNC: 138 MMOL/L — SIGNIFICANT CHANGE UP (ref 135–145)
WBC # BLD: 7.24 K/UL — SIGNIFICANT CHANGE UP (ref 3.8–10.5)
WBC # FLD AUTO: 7.24 K/UL — SIGNIFICANT CHANGE UP (ref 3.8–10.5)

## 2021-09-15 PROCEDURE — 97116 GAIT TRAINING THERAPY: CPT

## 2021-09-15 PROCEDURE — 76376 3D RENDER W/INTRP POSTPROCES: CPT

## 2021-09-15 PROCEDURE — 84484 ASSAY OF TROPONIN QUANT: CPT

## 2021-09-15 PROCEDURE — 72192 CT PELVIS W/O DYE: CPT | Mod: MG

## 2021-09-15 PROCEDURE — 80053 COMPREHEN METABOLIC PANEL: CPT

## 2021-09-15 PROCEDURE — G1004: CPT

## 2021-09-15 PROCEDURE — 97110 THERAPEUTIC EXERCISES: CPT

## 2021-09-15 PROCEDURE — U0005: CPT

## 2021-09-15 PROCEDURE — 86901 BLOOD TYPING SEROLOGIC RH(D): CPT

## 2021-09-15 PROCEDURE — 72125 CT NECK SPINE W/O DYE: CPT

## 2021-09-15 PROCEDURE — 86850 RBC ANTIBODY SCREEN: CPT

## 2021-09-15 PROCEDURE — 97162 PT EVAL MOD COMPLEX 30 MIN: CPT

## 2021-09-15 PROCEDURE — 97535 SELF CARE MNGMENT TRAINING: CPT

## 2021-09-15 PROCEDURE — 80048 BASIC METABOLIC PNL TOTAL CA: CPT

## 2021-09-15 PROCEDURE — 85730 THROMBOPLASTIN TIME PARTIAL: CPT

## 2021-09-15 PROCEDURE — C1713: CPT

## 2021-09-15 PROCEDURE — 71045 X-RAY EXAM CHEST 1 VIEW: CPT

## 2021-09-15 PROCEDURE — 87086 URINE CULTURE/COLONY COUNT: CPT

## 2021-09-15 PROCEDURE — 86900 BLOOD TYPING SEROLOGIC ABO: CPT

## 2021-09-15 PROCEDURE — 36415 COLL VENOUS BLD VENIPUNCTURE: CPT

## 2021-09-15 PROCEDURE — 73502 X-RAY EXAM HIP UNI 2-3 VIEWS: CPT

## 2021-09-15 PROCEDURE — 99285 EMERGENCY DEPT VISIT HI MDM: CPT | Mod: 25

## 2021-09-15 PROCEDURE — 90686 IIV4 VACC NO PRSV 0.5 ML IM: CPT

## 2021-09-15 PROCEDURE — 93005 ELECTROCARDIOGRAM TRACING: CPT

## 2021-09-15 PROCEDURE — 81001 URINALYSIS AUTO W/SCOPE: CPT

## 2021-09-15 PROCEDURE — 97166 OT EVAL MOD COMPLEX 45 MIN: CPT

## 2021-09-15 PROCEDURE — 83735 ASSAY OF MAGNESIUM: CPT

## 2021-09-15 PROCEDURE — 85610 PROTHROMBIN TIME: CPT

## 2021-09-15 PROCEDURE — U0003: CPT

## 2021-09-15 PROCEDURE — 85027 COMPLETE CBC AUTOMATED: CPT

## 2021-09-15 PROCEDURE — 76000 FLUOROSCOPY <1 HR PHYS/QHP: CPT

## 2021-09-15 PROCEDURE — 85025 COMPLETE CBC W/AUTO DIFF WBC: CPT

## 2021-09-15 PROCEDURE — 70450 CT HEAD/BRAIN W/O DYE: CPT

## 2021-09-15 PROCEDURE — C1769: CPT

## 2021-09-15 PROCEDURE — 86769 SARS-COV-2 COVID-19 ANTIBODY: CPT

## 2021-09-15 RX ORDER — WARFARIN SODIUM 2.5 MG/1
10 TABLET ORAL ONCE
Refills: 0 | Status: DISCONTINUED | OUTPATIENT
Start: 2021-09-15 | End: 2021-09-15

## 2021-09-15 RX ORDER — POTASSIUM CHLORIDE 20 MEQ
20 PACKET (EA) ORAL ONCE
Refills: 0 | Status: COMPLETED | OUTPATIENT
Start: 2021-09-15 | End: 2021-09-15

## 2021-09-15 RX ORDER — CIPROFLOXACIN LACTATE 400MG/40ML
1 VIAL (ML) INTRAVENOUS
Qty: 0 | Refills: 0 | DISCHARGE

## 2021-09-15 RX ORDER — ENOXAPARIN SODIUM 100 MG/ML
55 INJECTION SUBCUTANEOUS
Qty: 0 | Refills: 0 | DISCHARGE
Start: 2021-09-15

## 2021-09-15 RX ORDER — TRAMADOL HYDROCHLORIDE 50 MG/1
1 TABLET ORAL
Qty: 0 | Refills: 0 | DISCHARGE
Start: 2021-09-15

## 2021-09-15 RX ORDER — OXYCODONE HYDROCHLORIDE 5 MG/1
1 TABLET ORAL
Qty: 0 | Refills: 0 | DISCHARGE
Start: 2021-09-15

## 2021-09-15 RX ORDER — LANOLIN ALCOHOL/MO/W.PET/CERES
1 CREAM (GRAM) TOPICAL
Qty: 0 | Refills: 0 | DISCHARGE
Start: 2021-09-15

## 2021-09-15 RX ADMIN — Medication 650 MILLIGRAM(S): at 05:37

## 2021-09-15 RX ADMIN — Medication 1 TABLET(S): at 11:17

## 2021-09-15 RX ADMIN — Medication 25 MILLIGRAM(S): at 05:38

## 2021-09-15 RX ADMIN — Medication 650 MILLIGRAM(S): at 11:16

## 2021-09-15 RX ADMIN — Medication 650 MILLIGRAM(S): at 17:28

## 2021-09-15 RX ADMIN — MEMANTINE HYDROCHLORIDE 10 MILLIGRAM(S): 10 TABLET ORAL at 11:16

## 2021-09-15 RX ADMIN — Medication 20 MILLIGRAM(S): at 11:17

## 2021-09-15 RX ADMIN — INFLUENZA VIRUS VACCINE 0.5 MILLILITER(S): 15; 15; 15; 15 SUSPENSION INTRAMUSCULAR at 17:24

## 2021-09-15 RX ADMIN — Medication 650 MILLIGRAM(S): at 05:38

## 2021-09-15 RX ADMIN — ENOXAPARIN SODIUM 55 MILLIGRAM(S): 100 INJECTION SUBCUTANEOUS at 17:29

## 2021-09-15 RX ADMIN — Medication 20 MILLIEQUIVALENT(S): at 11:16

## 2021-09-15 RX ADMIN — Medication 240 MILLIGRAM(S): at 05:37

## 2021-09-15 RX ADMIN — Medication 650 MILLIGRAM(S): at 00:22

## 2021-09-15 RX ADMIN — ENOXAPARIN SODIUM 55 MILLIGRAM(S): 100 INJECTION SUBCUTANEOUS at 05:38

## 2021-09-15 RX ADMIN — Medication 650 MILLIGRAM(S): at 00:24

## 2021-09-15 RX ADMIN — OXYCODONE HYDROCHLORIDE 5 MILLIGRAM(S): 5 TABLET ORAL at 08:08

## 2021-09-15 RX ADMIN — OXYCODONE HYDROCHLORIDE 5 MILLIGRAM(S): 5 TABLET ORAL at 08:05

## 2021-09-15 RX ADMIN — Medication 650 MILLIGRAM(S): at 11:15

## 2021-09-15 NOTE — PROGRESS NOTE ADULT - ASSESSMENT
1 Femur fracture  - spp  OR   - pain control  - ortho fu  - will monitor     2 Afib  - check inr and coumadin daily   - started Lovenox bridge    - cards fu      3 UTI  - finished antibiotics  - id fu appreciated     dc planning to rehab

## 2021-09-15 NOTE — PROGRESS NOTE ADULT - PROVIDER SPECIALTY LIST ADULT
Cardiology
Hospitalist
Hospitalist
Anesthesia
Cardiology
Infectious Disease
Orthopedics
Orthopedics
Cardiology
Cardiology
Hospitalist
Infectious Disease
Orthopedics
Cardiology
Orthopedics

## 2021-09-15 NOTE — PROGRESS NOTE ADULT - SUBJECTIVE AND OBJECTIVE BOX
Patient interviewed and examined at bedside. Pain is controlled. Patient is feeling well and denies any headache, fevers, chest pain, shortness of breath nausea, vomiting, numbness, or tingling. No other orthopaedic complaints at present.       VITAL SIGNS:  T(C): 36.7 (15 Sep 2021 05:15), Max: 36.9 (14 Sep 2021 19:40)  T(F): 98.1 (15 Sep 2021 05:15), Max: 98.4 (14 Sep 2021 19:40)  HR: 87 (15 Sep 2021 05:15) (67 - 87)  BP: 136/85 (15 Sep 2021 05:15) (104/70 - 136/85)  RR: 18 (15 Sep 2021 05:15) (17 - 18)  SpO2: 94% (15 Sep 2021 05:15) (94% - 95%)          LABS:                          12.1   7.49  )-----------( 172      ( 14 Sep 2021 07:15 )             35.6     09-14    137  |  105  |  19  ----------------------------<  88  3.6   |  27  |  0.74    Ca    7.7<L>      14 Sep 2021 07:15    TPro  5.8<L>  /  Alb  2.2<L>  /  TBili  0.8  /  DBili  x   /  AST  24  /  ALT  14  /  AlkPhos  48  09-14    PT/INR - ( 14 Sep 2021 07:15 )   PT: 14.9 sec;   INR: 1.29 ratio           Exam:    Gen: NAD, resting comfortably  RLE:  Dressing C/D/I.  +EHL/FHL/TA/GS.   SILT L2-S1.   Compartments soft and compressible.   DP pulses palpable.   No calf TTP bilaterally.

## 2021-09-15 NOTE — PROGRESS NOTE ADULT - SUBJECTIVE AND OBJECTIVE BOX
Patient is a 87y old  Female who presents with a chief complaint of fall (15 Sep 2021 06:39)    Date of servie : 09-15-21 @ 13:39  INTERVAL HPI/OVERNIGHT EVENTS:  T(C): 36.9 (09-15-21 @ 12:44), Max: 36.9 (09-14-21 @ 19:40)  HR: 80 (09-15-21 @ 12:44) (67 - 87)  BP: 135/71 (09-15-21 @ 12:44) (117/70 - 136/85)  RR: 18 (09-15-21 @ 12:44) (17 - 18)  SpO2: 92% (09-15-21 @ 12:44) (92% - 95%)  Wt(kg): --  I&O's Summary    14 Sep 2021 07:01  -  15 Sep 2021 07:00  --------------------------------------------------------  IN: 0 mL / OUT: 1300 mL / NET: -1300 mL        LABS:                        11.8   7.24  )-----------( 199      ( 15 Sep 2021 07:27 )             34.8     09-15    138  |  105  |  17  ----------------------------<  82  3.3<L>   |  26  |  0.66    Ca    7.8<L>      15 Sep 2021 07:27    TPro  5.9<L>  /  Alb  2.2<L>  /  TBili  0.9  /  DBili  x   /  AST  22  /  ALT  13  /  AlkPhos  49  09-15    PT/INR - ( 15 Sep 2021 07:27 )   PT: 16.1 sec;   INR: 1.40 ratio         PTT - ( 15 Sep 2021 07:27 )  PTT:32.9 sec    CAPILLARY BLOOD GLUCOSE                MEDICATIONS  (STANDING):  acetaminophen   Tablet .. 650 milliGRAM(s) Oral every 6 hours  atorvastatin 10 milliGRAM(s) Oral at bedtime  diltiazem    milliGRAM(s) Oral at bedtime  diltiazem    milliGRAM(s) Oral daily  enoxaparin Injectable 55 milliGRAM(s) SubCutaneous two times a day  influenza   Vaccine 0.5 milliLiter(s) IntraMuscular once  memantine 10 milliGRAM(s) Oral daily  metoprolol succinate ER 25 milliGRAM(s) Oral daily  multivitamin 1 Tablet(s) Oral daily  PARoxetine 20 milliGRAM(s) Oral daily  sodium chloride 0.9%. 1000 milliLiter(s) (100 mL/Hr) IV Continuous <Continuous>  warfarin 10 milliGRAM(s) Oral once    MEDICATIONS  (PRN):  melatonin 3 milliGRAM(s) Oral at bedtime PRN Insomnia  oxyCODONE    IR 2.5 milliGRAM(s) Oral every 4 hours PRN Moderate Pain (4 - 6)  oxyCODONE    IR 5 milliGRAM(s) Oral every 4 hours PRN Severe Pain (7 - 10)  polyethylene glycol 3350 17 Gram(s) Oral at bedtime PRN Constipation  senna 2 Tablet(s) Oral at bedtime PRN Constipation  traMADol 50 milliGRAM(s) Oral every 6 hours PRN Mild Pain (1 - 3)          PHYSICAL EXAM:  GENERAL: NAD, well-groomed, well-developed  HEAD:  Atraumatic, Normocephalic  CHEST/LUNG: Clear to percussion bilaterally; No rales, rhonchi, wheezing, or rubs  HEART: Regular rate and rhythm; No murmurs, rubs, or gallops  ABDOMEN: Soft, Nontender, Nondistended; Bowel sounds present  EXTREMITIES:  2+ Peripheral Pulses, No clubbing, cyanosis, or edema  LYMPH: No lymphadenopathy noted  SKIN: No rashes or lesions    Care Discussed with Consultants/Other Providers [ ] YES  [ ] NO

## 2021-09-15 NOTE — DISCHARGE NOTE NURSING/CASE MANAGEMENT/SOCIAL WORK - NSDCVIVACCINE_GEN_ALL_CORE_FT
No Vaccines Administered.
good return demonstration/needs met/verbalizes understanding/demonstrates understanding of teaching

## 2021-09-15 NOTE — PROGRESS NOTE ADULT - SUBJECTIVE AND OBJECTIVE BOX
Patient is a 87y Female with a known history of :  Fracture of femoral neck, right [S72.001A]      HPI:  87y Female who presented to the ED s/p fall at home as she was making her bed. She landed on her right side unwitnessed was helped up by her son in law. She denies any LOC or head trauma. Currently complains of right hip pain, declines any other injuries. Pt is a poor historian and confused at baseline. Has a past medical history of A fib on coumadin 10 mg daily, unsure if she got a dose today will confirm with daughter. Has a pacemaker, dementia, and osteopenia. Son in law states she has a UTI and was started on cipro 2 days ago and has not finished. Daughter is the Healthcare proxy and will be coming in the afternoon to discuss surgical intervention.  (09 Sep 2021 18:18)    follow up today pt is very comfortable with no report of pain or discomfort.  Slightly confused.     REVIEW OF SYSTEMS:    CONSTITUTIONAL: No weakness, no fevers   EYES/ENT: No visual changes  NECK: No pain or stiffness  RESPIRATORY: No shortness of breath  CARDIOVASCULAR: No chest pain or palpitations  GASTROINTESTINAL: No abdominal pain  GENITOURINARY: No hematuria  NEUROLOGICAL: No weakness  SKIN: No rash  All other review of systems is negative unless indicated above      MEDICATIONS  (STANDING):  acetaminophen   Tablet .. 650 milliGRAM(s) Oral every 6 hours  atorvastatin 10 milliGRAM(s) Oral at bedtime  diltiazem    milliGRAM(s) Oral at bedtime  diltiazem    milliGRAM(s) Oral daily  enoxaparin Injectable 55 milliGRAM(s) SubCutaneous two times a day  influenza   Vaccine 0.5 milliLiter(s) IntraMuscular once  memantine 10 milliGRAM(s) Oral daily  metoprolol succinate ER 25 milliGRAM(s) Oral daily  multivitamin 1 Tablet(s) Oral daily  PARoxetine 20 milliGRAM(s) Oral daily  sodium chloride 0.9%. 1000 milliLiter(s) (100 mL/Hr) IV Continuous <Continuous>  warfarin 10 milliGRAM(s) Oral once    MEDICATIONS  (PRN):  melatonin 3 milliGRAM(s) Oral at bedtime PRN Insomnia  oxyCODONE    IR 2.5 milliGRAM(s) Oral every 4 hours PRN Moderate Pain (4 - 6)  oxyCODONE    IR 5 milliGRAM(s) Oral every 4 hours PRN Severe Pain (7 - 10)  polyethylene glycol 3350 17 Gram(s) Oral at bedtime PRN Constipation  senna 2 Tablet(s) Oral at bedtime PRN Constipation  traMADol 50 milliGRAM(s) Oral every 6 hours PRN Mild Pain (1 - 3)      ALLERGIES: No Known Allergies      FAMILY HISTORY:  No pertinent family history in first degree relatives        PHYSICAL EXAMINATION:  -----------------------------  T(C): 36.9 (09-15-21 @ 12:44), Max: 36.9 (09-14-21 @ 19:40)  HR: 80 (09-15-21 @ 12:44) (67 - 87)  BP: 135/71 (09-15-21 @ 12:44) (117/70 - 136/85)  RR: 18 (09-15-21 @ 12:44) (17 - 18)  SpO2: 92% (09-15-21 @ 12:44) (92% - 95%)  Wt(kg): --    09-14 @ 07:01  -  09-15 @ 07:00  --------------------------------------------------------  IN:  Total IN: 0 mL    OUT:    Voided (mL): 1300 mL  Total OUT: 1300 mL    Total NET: -1300 mL      PHYSICAL EXAM:    Constitutional: NAD, awake and alert, well-developed  Eyes:  EOMI,  Pupils round, No oral cyanosis.  HEENT: No exudate or erythema  Pulmonary: Non-labored, breath sounds are clear bilaterally, No wheezing, rales or rhonchi  Cardiovascular: Regular, S1 and S2, No murmurs, rubs, gallops oir clicks  Gastrointestinal: Bowel Sounds present, soft, nontender.   Ext: No significant LE edema with good pulses x 4  Neurological: Alert, no gross focal motor deficits  Skin: No rashes.  Psych:  Mood & affect appropriate    LABS: All Labs Reviewed:          LABS:   --------  09-15    138  |  105  |  17  ----------------------------<  82  3.3<L>   |  26  |  0.66    Ca    7.8<L>      15 Sep 2021 07:27    TPro  5.9<L>  /  Alb  2.2<L>  /  TBili  0.9  /  DBili  x   /  AST  22  /  ALT  13  /  AlkPhos  49  09-15                         11.8   7.24  )-----------( 199      ( 15 Sep 2021 07:27 )             34.8     PT/INR - ( 15 Sep 2021 07:27 )   PT: 16.1 sec;   INR: 1.40 ratio         PTT - ( 15 Sep 2021 07:27 )  PTT:32.9 sec

## 2021-09-15 NOTE — DISCHARGE NOTE NURSING/CASE MANAGEMENT/SOCIAL WORK - NSDCPELOVENOXREACT_GEN_ALL_CORE
Enoxaparin/Lovenox increases your risk for bleeding. Notify your doctor if you experience any of the following side effects: unusual bleeding or bruising, coughing up or vomiting blood, red or black stool, blood in your urine, itching or hives, chest tightness, chest pain, shortness of breath, trouble breathing, swelling in your face or hands, swelling in your mouth or throat, fever, large flat blue or purplish patches on the skin, numbness or weakness in your arm or leg on one side, pain in your lower leg, sudden or severe headache with vision, speech or walking problems. When Enoxaparin/Lovenox is taken with other medicines, they can affect how it works. Taking other medications such as aspirin, blood thinners, and nonsteroidal anti-inflammatories increases your risk of bleeding. It is very important to tell your health care provider about all of the other medicines, including over-the-counter medications, herbs, and vitamins you are taking. DO NOT start, stop, or change the dosage of any medicine, including over-the-counter medicines, vitamins, and herbal products without your doctor’s approval. Any products containing aspirin or are nonsteroidal anti-inflammatories lessen the blood’s ability to form clots and adds to the effect of Enoxaparin/Lovenox. Never take aspirin or medicines that contain aspirin without speaking to your doctor. no

## 2021-09-15 NOTE — PROGRESS NOTE ADULT - ASSESSMENT
Assessment:  87F POD4 s/p R Hip CRPP.     Plan:  Follow up AM labs.  WBAT RLE.   Pain management PRN.  PT/OT.   Continue PPx antibiotics.   DVT PPx: home dose coumadin per cards recommendations.   Incentive spirometry.   Ortho stable for d/c to rehab  FU outpatient  Discussed the above with attending who agrees with plan.  Assessment:  87F POD4 s/p R Hip CRPP.     Plan:  Follow up AM labs.  WBAT RLE.   Pain management PRN.  PT/OT.   Continue PPx antibiotics.   DVT PPx: home dose coumadin, medicine bridging with Lovenox.   Incentive spirometry.   Ortho stable for d/c to rehab.   FU outpatient.   Discussed the above with attending who agrees with plan.

## 2021-09-15 NOTE — PROGRESS NOTE ADULT - ASSESSMENT
87y Female who presented to the ED s/p fall at home as she was making her bed. She landed on her right side unwitnessed was helped up by her son in law. She denies any LOC or head trauma. Currently complains of right hip pain, declines any other injuries. Pt is a poor historian and confused at baseline. Has a past medical history of A fib on coumadin 10 mg daily, unsure if she got a dose today will confirm with daughter. Has a pacemaker, dementia, and osteopenia. Son in law states she has a UTI and was started on cipro 2 days ago and has not finished. Daughter is the Healthcare proxy and will be coming in the afternoon to discuss surgical intervention.  (09 Sep 2021 18:18)    HIP fx  post-op day #6  Pt denies CP or SOB    AF, PPM  rate good on cardizem total 360 mg daily and metoprolol 25 mg.   EKG paced with AF  PPM interrogation in chart, no events last 90 days. battery>2.5 years  restarted on Coumadin POD 3 - INR today 1.4.  Receives 10mg coumadin as outpatient  Cont bridge lovenox.    pt sees my partner Dr Delong, EP, [phone]  Will follow here as well

## 2021-09-15 NOTE — PROGRESS NOTE ADULT - REASON FOR ADMISSION
Fall.
fall
Fall.
fall

## 2021-09-15 NOTE — PROGRESS NOTE ADULT - SUBJECTIVE AND OBJECTIVE BOX
SADIA CRUZ is a 87yFemale , patient examined and chart reviewed.    INTERVAL HPI/ OVERNIGHT EVENTS:   Afebrile. NAD.  In chair.    PAST MEDICAL & SURGICAL HISTORY:  Osteopenia  High cholesterol  Atrial fibrillation      For details regarding the patient's social history, family history, and other miscellaneous elements, please refer the initial infectious diseases consultation and/or the admitting history and physical examination for this admission.    ROS:  Unable to obtain due to : Dementia         Current inpatient medications :    ANTIBIOTICS/RELEVANT:    MEDICATIONS  (STANDING):  acetaminophen   Tablet .. 650 milliGRAM(s) Oral every 6 hours  atorvastatin 10 milliGRAM(s) Oral at bedtime  diltiazem    milliGRAM(s) Oral at bedtime  diltiazem    milliGRAM(s) Oral daily  enoxaparin Injectable 55 milliGRAM(s) SubCutaneous two times a day  influenza   Vaccine 0.5 milliLiter(s) IntraMuscular once  memantine 10 milliGRAM(s) Oral daily  metoprolol succinate ER 25 milliGRAM(s) Oral daily  multivitamin 1 Tablet(s) Oral daily  PARoxetine 20 milliGRAM(s) Oral daily  sodium chloride 0.9%. 1000 milliLiter(s) (100 mL/Hr) IV Continuous <Continuous>  warfarin 10 milliGRAM(s) Oral once    MEDICATIONS  (PRN):  melatonin 3 milliGRAM(s) Oral at bedtime PRN Insomnia  oxyCODONE    IR 2.5 milliGRAM(s) Oral every 4 hours PRN Moderate Pain (4 - 6)  oxyCODONE    IR 5 milliGRAM(s) Oral every 4 hours PRN Severe Pain (7 - 10)  polyethylene glycol 3350 17 Gram(s) Oral at bedtime PRN Constipation  senna 2 Tablet(s) Oral at bedtime PRN Constipation  traMADol 50 milliGRAM(s) Oral every 6 hours PRN Mild Pain (1 - 3)      Objective:  Vital Signs Last 24 Hrs  T(C): 36.9 (15 Sep 2021 12:44), Max: 36.9 (14 Sep 2021 19:40)  T(F): 98.4 (15 Sep 2021 12:44), Max: 98.4 (14 Sep 2021 19:40)  HR: 80 (15 Sep 2021 12:44) (67 - 87)  BP: 135/71 (15 Sep 2021 12:44) (117/70 - 136/85)  RR: 18 (15 Sep 2021 12:44) (17 - 18)  SpO2: 92% (15 Sep 2021 12:44) (92% - 95%)      Physical Exam:  General:  no acute distress  Neck: supple, trachea midline  Lungs: clear, no wheeze/rhonchi  Cardiovascular: regular rate and rhythm, S1 S2  Abdomen: soft, nontender,  bowel sounds normal  Neurological: awake confused  Skin: no rash  Extremities: Right hip drsg c/d/i      LABS:                        11.8   7.24  )-----------( 199      ( 15 Sep 2021 07:27 )             34.8   09-15    138  |  105  |  17  ----------------------------<  82  3.3<L>   |  26  |  0.66    Ca    7.8<L>      15 Sep 2021 07:27    TPro  5.9<L>  /  Alb  2.2<L>  /  TBili  0.9  /  DBili  x   /  AST  22  /  ALT  13  /  AlkPhos  49  09-15       MICROBIOLOGY:  Culture - Urine (09.10.21 @ 12:00)    Specimen Source: Clean Catch Clean Catch (Midstream)    Culture Results:   <10,000 CFU/mL Normal Urogenital Kristi      RADIOLOGY & ADDITIONAL STUDIES:    EXAM:  CT 3D RECONSTRUCT  JUAN C                          EXAM:  CT PELVIS BONY ONLY                            PROCEDURE DATE:  09/09/2021          INTERPRETATION:  EXAMINATION: CT of the pelvis without contrast    CLINICAL INFORMATION: Pelvic trauma    TECHNIQUE: Axial CT images were obtained through the pelvis. Coronal and sagittal reformatted images were made. 3-D reconstruction images were also performed.    FINDINGS: There is an acute minimally displaced right transverse cervical femoral neck fracture. There are no advanced arthritic changes at the hips. There is lower lumbar spondylosis. There are mild degenerative changes at the sacroiliac joints and pubic symphysis. The bones are diffusely demineralized.    There is diverticulosis. There is moderate amount stool in the colon and rectum. There are vascular calcifications.    The 3-D reconstruction images confirm the acute right transcervical femoral neck fracture.    IMPRESSION: Acute minimally displaced right transverse femoral neck fracture.      Assessment :   88YO F PMH Dementia, Afib on coumadin, pacemaker, and osteopenia admitted with right hip fracture sp right THR 9/11/21.  Per family pt was diagnosed with UTI and was started on cipro 2 days prior to admission.  Pt was on Rocephin on admission till 9/11.  UA neg and ucx ngtd  Overall clinically stable    Plan :   Monitor off antibiotics  Trend temps and cbc  Activity per ortho  Pulm toileting  Strict asp precautions  No active ID issues  Will sign off case    D/w Dr Hancock    Continue with present regiment.  Appropriate use of antibiotics and adverse effects reviewed.      > 35 minutes were spent in direct patient care reviewing notes, medications ,labs data/ imaging , discussion with multidisciplinary team.    Thank you for allowing me to participate in care of your patient .    Mushtaq Griggs MD  Infectious Disease  [phone]

## 2021-09-15 NOTE — DISCHARGE NOTE NURSING/CASE MANAGEMENT/SOCIAL WORK - NSDCPEFALRISK_GEN_ALL_CORE
For information on Fall & injury Prevention, visit [email or web address]

## 2021-09-15 NOTE — DISCHARGE NOTE NURSING/CASE MANAGEMENT/SOCIAL WORK - PATIENT PORTAL LINK FT
You can access the FollowMyHealth Patient Portal offered by [facility] by registering at the following website: [email or web address]. By joining [facility]’s FollowMyHealth portal, you will also be able to view your health information using other applications (apps) compatible with our system.

## 2021-09-16 ENCOUNTER — NON-APPOINTMENT (OUTPATIENT)
Age: 86
End: 2021-09-16

## 2021-10-25 ENCOUNTER — NON-APPOINTMENT (OUTPATIENT)
Age: 86
End: 2021-10-25

## 2022-03-18 NOTE — ED ADULT NURSE NOTE - NSFALLRSKASSISTTYPE_ED_ALL_ED
Please return if you have worsening symptoms.  Follow closely with your primary care physician Walking

## 2022-09-28 ENCOUNTER — EMERGENCY (EMERGENCY)
Facility: HOSPITAL | Age: 87
LOS: 1 days | Discharge: HOME | End: 2022-09-28
Attending: EMERGENCY MEDICINE | Admitting: EMERGENCY MEDICINE
Payer: MEDICARE

## 2022-09-28 VITALS
HEIGHT: 65 IN | TEMPERATURE: 98 F | HEART RATE: 70 BPM | WEIGHT: 119.93 LBS | SYSTOLIC BLOOD PRESSURE: 154 MMHG | RESPIRATION RATE: 16 BRPM | OXYGEN SATURATION: 97 % | DIASTOLIC BLOOD PRESSURE: 84 MMHG

## 2022-09-28 PROCEDURE — 99284 EMERGENCY DEPT VISIT MOD MDM: CPT

## 2022-09-28 PROCEDURE — 99282 EMERGENCY DEPT VISIT SF MDM: CPT

## 2022-09-28 NOTE — ED ADULT NURSE NOTE - OBJECTIVE STATEMENT
patient alert and oriented to self with history of dementia brought to ED after she walked without her walker and fell onto her back. patient did not lose conciousness and did not hit her head.

## 2022-09-28 NOTE — ED ADULT NURSE NOTE - NSIMPLEMENTINTERV_GEN_ALL_ED
Implemented All Fall with Harm Risk Interventions:  [place] to call system. Call bell, personal items and telephone within reach. Instruct patient to call for assistance. Room bathroom lighting operational. Non-slip footwear when patient is off stretcher. Physically safe environment: no spills, clutter or unnecessary equipment. Stretcher in lowest position, wheels locked, appropriate side rails in place. Provide visual cue, wrist band, yellow gown, etc. Monitor gait and stability. Monitor for mental status changes and reorient to person, place, and time. Review medications for side effects contributing to fall risk. Reinforce activity limits and safety measures with patient and family. Provide visual clues: red socks.

## 2022-09-28 NOTE — ED PROVIDER NOTE - CLINICAL SUMMARY MEDICAL DECISION MAKING FREE TEXT BOX
60 Mech fall today with no head trauma, no anticoag use - no signs of acute inj.  At length discussion with patient re nature of head injury.  Discussed CT Scan including the risk of occult pathology vs radiation risk and other associated risks of CT.  After much discussion, mady has decided not to have CT

## 2022-09-28 NOTE — ED PROVIDER NOTE - ENMT, MLM
Airway patent, Nasal mucosa clear. Mouth with normal mucosa. Throat has no vesicles, no oropharyngeal exudates and uvula is midline.  no ext signs of head trauma.

## 2022-09-28 NOTE — ED PROVIDER NOTE - OBJECTIVE STATEMENT
88-year-old female with history of A. fib-on aspirin, no anticoagulants, dementia presents with mechanical fall today at home.  Patient was not using her walker as she is usually supposed to do, lost her balance and fell on her buttocks.  Fall was witnessed.  No head injury.  No LOC.  No neck or back pain.  Patient denies any pain or complaints at this time.  Daughter at bedside, states patient is at her normal baseline mental status at this time.  No other acute complaints.  Patient fully vaccinated for COVID.  No recent exposures.

## 2022-09-28 NOTE — ED PROVIDER NOTE - MUSCULOSKELETAL, MLM
Spine appears normal, no spinal tend (c,t,l), range of motion is not limited, no muscle or joint tenderness noted, FROM bl hips, chronic dec ROM R shoulder - baseline for pt, nl L arm.

## 2022-09-28 NOTE — ED PROVIDER NOTE - PROGRESS NOTE DETAILS
Pt able to walk around ED with a walker at her baseline. Patient will follow-up with primary care doctor as discussed.  Discussed with patient's daughter regarding fall precautions, importance of close prompt follow-up, to return with any acute changes or concerns.

## 2022-09-28 NOTE — ED PROVIDER NOTE - CARE PROVIDER_API CALL
ERWIN COOK  Internal Medicine  [address], [address]  [place] [address]  Phone: [phone]  Fax: [phone]  Follow Up Time:

## 2022-10-21 ENCOUNTER — INPATIENT (INPATIENT)
Facility: HOSPITAL | Age: 87
LOS: 3 days | Discharge: HOME | DRG: 177 | End: 2022-10-25
Attending: INTERNAL MEDICINE | Admitting: INTERNAL MEDICINE
Payer: MEDICARE

## 2022-10-21 VITALS
SYSTOLIC BLOOD PRESSURE: 134 MMHG | WEIGHT: 149.91 LBS | RESPIRATION RATE: 18 BRPM | HEIGHT: 65 IN | OXYGEN SATURATION: 96 % | TEMPERATURE: 98 F | DIASTOLIC BLOOD PRESSURE: 80 MMHG | HEART RATE: 102 BPM

## 2022-10-21 DIAGNOSIS — R62.7 ADULT FAILURE TO THRIVE: ICD-10-CM

## 2022-10-21 LAB
ALBUMIN SERPL ELPH-MCNC: 2.8 G/DL — LOW (ref 3.3–5)
ALBUMIN SERPL ELPH-MCNC: 3 G/DL — LOW (ref 3.3–5)
ALP SERPL-CCNC: 64 U/L — SIGNIFICANT CHANGE UP (ref 40–120)
ALP SERPL-CCNC: 67 U/L — SIGNIFICANT CHANGE UP (ref 40–120)
ALT FLD-CCNC: 24 U/L — SIGNIFICANT CHANGE UP (ref 12–78)
ALT FLD-CCNC: 24 U/L — SIGNIFICANT CHANGE UP (ref 12–78)
ANION GAP SERPL CALC-SCNC: 7 MMOL/L — SIGNIFICANT CHANGE UP (ref 5–17)
APPEARANCE UR: CLEAR — SIGNIFICANT CHANGE UP
APTT BLD: 34 SEC — SIGNIFICANT CHANGE UP (ref 27.5–35.5)
AST SERPL-CCNC: 18 U/L — SIGNIFICANT CHANGE UP (ref 15–37)
AST SERPL-CCNC: 22 U/L — SIGNIFICANT CHANGE UP (ref 15–37)
BASOPHILS # BLD AUTO: 0.02 K/UL — SIGNIFICANT CHANGE UP (ref 0–0.2)
BASOPHILS NFR BLD AUTO: 0.5 % — SIGNIFICANT CHANGE UP (ref 0–2)
BILIRUB DIRECT SERPL-MCNC: 0.1 MG/DL — SIGNIFICANT CHANGE UP (ref 0–0.3)
BILIRUB INDIRECT FLD-MCNC: 0.4 MG/DL — SIGNIFICANT CHANGE UP (ref 0.2–1)
BILIRUB SERPL-MCNC: 0.5 MG/DL — SIGNIFICANT CHANGE UP (ref 0.2–1.2)
BILIRUB SERPL-MCNC: 0.6 MG/DL — SIGNIFICANT CHANGE UP (ref 0.2–1.2)
BILIRUB UR-MCNC: NEGATIVE — SIGNIFICANT CHANGE UP
BUN SERPL-MCNC: 14 MG/DL — SIGNIFICANT CHANGE UP (ref 7–23)
CALCIUM SERPL-MCNC: 8.6 MG/DL — SIGNIFICANT CHANGE UP (ref 8.5–10.1)
CHLORIDE SERPL-SCNC: 104 MMOL/L — SIGNIFICANT CHANGE UP (ref 96–108)
CO2 SERPL-SCNC: 26 MMOL/L — SIGNIFICANT CHANGE UP (ref 22–31)
COLOR SPEC: YELLOW — SIGNIFICANT CHANGE UP
CREAT SERPL-MCNC: 0.63 MG/DL — SIGNIFICANT CHANGE UP (ref 0.5–1.3)
CREAT SERPL-MCNC: 0.75 MG/DL — SIGNIFICANT CHANGE UP (ref 0.5–1.3)
DIFF PNL FLD: ABNORMAL
EGFR: 76 ML/MIN/1.73M2 — SIGNIFICANT CHANGE UP
EGFR: 85 ML/MIN/1.73M2 — SIGNIFICANT CHANGE UP
EOSINOPHIL # BLD AUTO: 0.01 K/UL — SIGNIFICANT CHANGE UP (ref 0–0.5)
EOSINOPHIL NFR BLD AUTO: 0.2 % — SIGNIFICANT CHANGE UP (ref 0–6)
GLUCOSE SERPL-MCNC: 102 MG/DL — HIGH (ref 70–99)
GLUCOSE UR QL: NEGATIVE — SIGNIFICANT CHANGE UP
HCT VFR BLD CALC: 40.1 % — SIGNIFICANT CHANGE UP (ref 34.5–45)
HGB BLD-MCNC: 13.4 G/DL — SIGNIFICANT CHANGE UP (ref 11.5–15.5)
IMM GRANULOCYTES NFR BLD AUTO: 1.2 % — HIGH (ref 0–0.9)
INR BLD: 1.1 RATIO — SIGNIFICANT CHANGE UP (ref 0.88–1.16)
KETONES UR-MCNC: NEGATIVE — SIGNIFICANT CHANGE UP
LEUKOCYTE ESTERASE UR-ACNC: NEGATIVE — SIGNIFICANT CHANGE UP
LYMPHOCYTES # BLD AUTO: 0.42 K/UL — LOW (ref 1–3.3)
LYMPHOCYTES # BLD AUTO: 10.4 % — LOW (ref 13–44)
MCHC RBC-ENTMCNC: 31.5 PG — SIGNIFICANT CHANGE UP (ref 27–34)
MCHC RBC-ENTMCNC: 33.4 GM/DL — SIGNIFICANT CHANGE UP (ref 32–36)
MCV RBC AUTO: 94.1 FL — SIGNIFICANT CHANGE UP (ref 80–100)
MONOCYTES # BLD AUTO: 0.5 K/UL — SIGNIFICANT CHANGE UP (ref 0–0.9)
MONOCYTES NFR BLD AUTO: 12.3 % — SIGNIFICANT CHANGE UP (ref 2–14)
NEUTROPHILS # BLD AUTO: 3.05 K/UL — SIGNIFICANT CHANGE UP (ref 1.8–7.4)
NEUTROPHILS NFR BLD AUTO: 75.4 % — SIGNIFICANT CHANGE UP (ref 43–77)
NITRITE UR-MCNC: NEGATIVE — SIGNIFICANT CHANGE UP
NRBC # BLD: 0 /100 WBCS — SIGNIFICANT CHANGE UP (ref 0–0)
PH UR: 6 — SIGNIFICANT CHANGE UP (ref 5–8)
PLATELET # BLD AUTO: 207 K/UL — SIGNIFICANT CHANGE UP (ref 150–400)
POTASSIUM SERPL-MCNC: 3.8 MMOL/L — SIGNIFICANT CHANGE UP (ref 3.5–5.3)
POTASSIUM SERPL-SCNC: 3.8 MMOL/L — SIGNIFICANT CHANGE UP (ref 3.5–5.3)
PROT SERPL-MCNC: 6.1 G/DL — SIGNIFICANT CHANGE UP (ref 6–8.3)
PROT SERPL-MCNC: 6.4 G/DL — SIGNIFICANT CHANGE UP (ref 6–8.3)
PROT UR-MCNC: 30 MG/DL
PROTHROM AB SERPL-ACNC: 12.9 SEC — SIGNIFICANT CHANGE UP (ref 10.5–13.4)
RBC # BLD: 4.26 M/UL — SIGNIFICANT CHANGE UP (ref 3.8–5.2)
RBC # FLD: 14.3 % — SIGNIFICANT CHANGE UP (ref 10.3–14.5)
SARS-COV-2 RNA SPEC QL NAA+PROBE: DETECTED
SODIUM SERPL-SCNC: 137 MMOL/L — SIGNIFICANT CHANGE UP (ref 135–145)
SP GR SPEC: 1.02 — SIGNIFICANT CHANGE UP (ref 1.01–1.02)
UROBILINOGEN FLD QL: NEGATIVE — SIGNIFICANT CHANGE UP
WBC # BLD: 4.05 K/UL — SIGNIFICANT CHANGE UP (ref 3.8–10.5)
WBC # FLD AUTO: 4.05 K/UL — SIGNIFICANT CHANGE UP (ref 3.8–10.5)

## 2022-10-21 PROCEDURE — 99285 EMERGENCY DEPT VISIT HI MDM: CPT

## 2022-10-21 PROCEDURE — 93010 ELECTROCARDIOGRAM REPORT: CPT

## 2022-10-21 PROCEDURE — 71045 X-RAY EXAM CHEST 1 VIEW: CPT | Mod: 26

## 2022-10-21 RX ORDER — SODIUM CHLORIDE 9 MG/ML
1000 INJECTION INTRAMUSCULAR; INTRAVENOUS; SUBCUTANEOUS ONCE
Refills: 0 | Status: COMPLETED | OUTPATIENT
Start: 2022-10-21 | End: 2022-10-21

## 2022-10-21 RX ORDER — ENOXAPARIN SODIUM 100 MG/ML
40 INJECTION SUBCUTANEOUS EVERY 24 HOURS
Refills: 0 | Status: DISCONTINUED | OUTPATIENT
Start: 2022-10-21 | End: 2022-10-25

## 2022-10-21 RX ORDER — MEMANTINE HYDROCHLORIDE 10 MG/1
10 TABLET ORAL DAILY
Refills: 0 | Status: DISCONTINUED | OUTPATIENT
Start: 2022-10-21 | End: 2022-10-25

## 2022-10-21 RX ORDER — LANOLIN ALCOHOL/MO/W.PET/CERES
3 CREAM (GRAM) TOPICAL AT BEDTIME
Refills: 0 | Status: DISCONTINUED | OUTPATIENT
Start: 2022-10-21 | End: 2022-10-25

## 2022-10-21 RX ORDER — DEXAMETHASONE 0.5 MG/5ML
6 ELIXIR ORAL DAILY
Refills: 0 | Status: DISCONTINUED | OUTPATIENT
Start: 2022-10-21 | End: 2022-10-22

## 2022-10-21 RX ORDER — REMDESIVIR 5 MG/ML
200 INJECTION INTRAVENOUS EVERY 24 HOURS
Refills: 0 | Status: COMPLETED | OUTPATIENT
Start: 2022-10-21 | End: 2022-10-22

## 2022-10-21 RX ORDER — REMDESIVIR 5 MG/ML
INJECTION INTRAVENOUS
Refills: 0 | Status: COMPLETED | OUTPATIENT
Start: 2022-10-21 | End: 2022-10-24

## 2022-10-21 RX ORDER — DILTIAZEM HCL 120 MG
240 CAPSULE, EXT RELEASE 24 HR ORAL DAILY
Refills: 0 | Status: DISCONTINUED | OUTPATIENT
Start: 2022-10-21 | End: 2022-10-25

## 2022-10-21 RX ORDER — OXYCODONE HYDROCHLORIDE 5 MG/1
5 TABLET ORAL EVERY 4 HOURS
Refills: 0 | Status: DISCONTINUED | OUTPATIENT
Start: 2022-10-21 | End: 2022-10-25

## 2022-10-21 RX ORDER — DILTIAZEM HCL 120 MG
120 CAPSULE, EXT RELEASE 24 HR ORAL DAILY
Refills: 0 | Status: DISCONTINUED | OUTPATIENT
Start: 2022-10-21 | End: 2022-10-25

## 2022-10-21 RX ORDER — ATORVASTATIN CALCIUM 80 MG/1
20 TABLET, FILM COATED ORAL AT BEDTIME
Refills: 0 | Status: DISCONTINUED | OUTPATIENT
Start: 2022-10-21 | End: 2022-10-25

## 2022-10-21 RX ORDER — METOPROLOL TARTRATE 50 MG
25 TABLET ORAL DAILY
Refills: 0 | Status: DISCONTINUED | OUTPATIENT
Start: 2022-10-21 | End: 2022-10-25

## 2022-10-21 RX ADMIN — SODIUM CHLORIDE 1000 MILLILITER(S): 9 INJECTION INTRAMUSCULAR; INTRAVENOUS; SUBCUTANEOUS at 13:35

## 2022-10-21 RX ADMIN — ATORVASTATIN CALCIUM 20 MILLIGRAM(S): 80 TABLET, FILM COATED ORAL at 22:35

## 2022-10-21 RX ADMIN — SODIUM CHLORIDE 1000 MILLILITER(S): 9 INJECTION INTRAMUSCULAR; INTRAVENOUS; SUBCUTANEOUS at 12:35

## 2022-10-21 RX ADMIN — ENOXAPARIN SODIUM 40 MILLIGRAM(S): 100 INJECTION SUBCUTANEOUS at 22:34

## 2022-10-21 RX ADMIN — Medication 120 MILLIGRAM(S): at 22:53

## 2022-10-21 NOTE — H&P ADULT - NSHPPHYSICALEXAM_GEN_ALL_CORE
General: frail  Neurology: confused   Respiratory: CTA B/L  CV: RRR, S1S2, no murmurs, rubs or gallops  Abdominal: Soft, NT, ND +BS, Last BM  Extremities: edema+

## 2022-10-21 NOTE — H&P ADULT - NSHPLABSRESULTS_GEN_ALL_CORE
Lab Results:  CBC  CBC Full  -  ( 21 Oct 2022 13:35 )  WBC Count : 4.05 K/uL  RBC Count : 4.26 M/uL  Hemoglobin : 13.4 g/dL  Hematocrit : 40.1 %  Platelet Count - Automated : 207 K/uL  Mean Cell Volume : 94.1 fl  Mean Cell Hemoglobin : 31.5 pg  Mean Cell Hemoglobin Concentration : 33.4 gm/dL  Auto Neutrophil # : 3.05 K/uL  Auto Lymphocyte # : 0.42 K/uL  Auto Monocyte # : 0.50 K/uL  Auto Eosinophil # : 0.01 K/uL  Auto Basophil # : 0.02 K/uL  Auto Neutrophil % : 75.4 %  Auto Lymphocyte % : 10.4 %  Auto Monocyte % : 12.3 %  Auto Eosinophil % : 0.2 %  Auto Basophil % : 0.5 %    .		Differential:	[] Automated		[] Manual  Chemistry                        13.4   4.05  )-----------( 207      ( 21 Oct 2022 13:35 )             40.1     10-21    137  |  104  |  14  ----------------------------<  102<H>  3.8   |  26  |  0.75    Ca    8.6      21 Oct 2022 13:35    TPro  6.4  /  Alb  3.0<L>  /  TBili  0.6  /  DBili  x   /  AST  18  /  ALT  24  /  AlkPhos  67  10-21    LIVER FUNCTIONS - ( 21 Oct 2022 13:35 )  Alb: 3.0 g/dL / Pro: 6.4 g/dL / ALK PHOS: 67 U/L / ALT: 24 U/L / AST: 18 U/L / GGT: x             Urinalysis Basic - ( 21 Oct 2022 15:30 )    Color: Yellow / Appearance: Clear / S.020 / pH: x  Gluc: x / Ketone: Negative  / Bili: Negative / Urobili: Negative   Blood: x / Protein: 30 mg/dL / Nitrite: Negative   Leuk Esterase: Negative / RBC: 0-2 /HPF / WBC 0-2   Sq Epi: x / Non Sq Epi: Occasional / Bacteria: Occasional            MICROBIOLOGY/CULTURES:      RADIOLOGY RESULTS: reviewed

## 2022-10-21 NOTE — ED PROVIDER NOTE - CONSTITUTIONAL, MLM
ill appearing awake, alert, oriented to person, place, not  time/situation and in no apparent distress dry mucous membrane normal...

## 2022-10-21 NOTE — ED ADULT NURSE NOTE - OBJECTIVE STATEMENT
Presents to ER with cough since yesterday. Pt has advanced dementia and is only knows her name. Confused to her birthday, where she is, the year, and who is our president. Daughter is primary source of information stating pt has had a cough and feeling lethargic since yesterday.  Is concerned pt has covid.  Respirations nonlabored and eupneic maintaining SPO2 >95% on RA.

## 2022-10-21 NOTE — ED PROVIDER NOTE - CLINICAL SUMMARY MEDICAL DECISION MAKING FREE TEXT BOX
89-year-old female with past medical history of A. fib on aspirin, dementia, usually ambulates with a walker, brought in by ambulance from home with report of positive home COVID test at bedside states that patient has been coughing has had decreased appetite and has been unable to get out of bed on evaluation and that is why she called an ambulance, information obtained from the daughter and chart, patient currently not hypoxic, will admit for weakness and further management of COVID.

## 2022-10-21 NOTE — ED PROVIDER NOTE - NS ED ATTENDING STATEMENT MOD
This was a shared visit with the NU. I reviewed and verified the documentation and independently performed the documented:

## 2022-10-21 NOTE — H&P ADULT - ASSESSMENT
89-year-old female past medical history of of A.genna not on AC hyperlipidemia dementia here for generalized weakness cough for last 2 days.  Patient tested positive for COVID today.  Patient lives with daughter unable to get out of bed dressed to eat usually walks with a walker unable to walk today.  Daughter states patient has been more lethargic.  No trauma or falls no chest pain or shortness of breath.    1 covid 19  - cw remdesivir  - cw decadron  - supportive care    2 Afib  - not on AC now sec to fall risk  - cw diltiazem- cw toprol  - cards to follow    3 HLD  - cw statin    4 Lovenox for DVT prophylaxis

## 2022-10-21 NOTE — ED ADULT NURSE NOTE - NSIMPLEMENTINTERV_GEN_ALL_ED
Implemented All Fall Risk Interventions:  [place] to call system. Call bell, personal items and telephone within reach. Instruct patient to call for assistance. Room bathroom lighting operational. Non-slip footwear when patient is off stretcher. Physically safe environment: no spills, clutter or unnecessary equipment. Stretcher in lowest position, wheels locked, appropriate side rails in place. Provide visual cue, wrist band, yellow gown, etc. Monitor gait and stability. Monitor for mental status changes and reorient to person, place, and time. Review medications for side effects contributing to fall risk. Reinforce activity limits and safety measures with patient and family.

## 2022-10-21 NOTE — ED PROVIDER NOTE - OBJECTIVE STATEMENT
Patient is a 89-year-old female past medical history of of AAnjelica vail not on AC hyperlipidemia dementia here for generalized weakness cough for last 2 days.  Patient tested positive for COVID today.  Patient lives with daughter unable to get out of bed dressed to eat usually walks with a walker unable to walk today.  Daughter states patient has been more lethargic.  No trauma or falls no chest pain or shortness of breath.

## 2022-10-21 NOTE — H&P ADULT - HISTORY OF PRESENT ILLNESS
89-year-old female past medical history of of TAMEKA vail not on AC hyperlipidemia dementia here for generalized weakness cough for last 2 days.  Patient tested positive for COVID today.  Patient lives with daughter unable to get out of bed dressed to eat usually walks with a walker unable to walk today.  Daughter states patient has been more lethargic.  No trauma or falls no chest pain or shortness of breath.

## 2022-10-22 LAB
ALBUMIN SERPL ELPH-MCNC: 2.9 G/DL — LOW (ref 3.3–5)
ALBUMIN SERPL ELPH-MCNC: 3 G/DL — LOW (ref 3.3–5)
ALP SERPL-CCNC: 64 U/L — SIGNIFICANT CHANGE UP (ref 40–120)
ALP SERPL-CCNC: 65 U/L — SIGNIFICANT CHANGE UP (ref 40–120)
ALT FLD-CCNC: 25 U/L — SIGNIFICANT CHANGE UP (ref 12–78)
ALT FLD-CCNC: 26 U/L — SIGNIFICANT CHANGE UP (ref 12–78)
ANION GAP SERPL CALC-SCNC: 9 MMOL/L — SIGNIFICANT CHANGE UP (ref 5–17)
AST SERPL-CCNC: 30 U/L — SIGNIFICANT CHANGE UP (ref 15–37)
AST SERPL-CCNC: 31 U/L — SIGNIFICANT CHANGE UP (ref 15–37)
BILIRUB DIRECT SERPL-MCNC: 0.1 MG/DL — SIGNIFICANT CHANGE UP (ref 0–0.3)
BILIRUB INDIRECT FLD-MCNC: 0.3 MG/DL — SIGNIFICANT CHANGE UP (ref 0.2–1)
BILIRUB SERPL-MCNC: 0.4 MG/DL — SIGNIFICANT CHANGE UP (ref 0.2–1.2)
BILIRUB SERPL-MCNC: 0.4 MG/DL — SIGNIFICANT CHANGE UP (ref 0.2–1.2)
BUN SERPL-MCNC: 10 MG/DL — SIGNIFICANT CHANGE UP (ref 7–23)
CALCIUM SERPL-MCNC: 8.1 MG/DL — LOW (ref 8.5–10.1)
CHLORIDE SERPL-SCNC: 102 MMOL/L — SIGNIFICANT CHANGE UP (ref 96–108)
CHOLEST SERPL-MCNC: 161 MG/DL — SIGNIFICANT CHANGE UP
CO2 SERPL-SCNC: 25 MMOL/L — SIGNIFICANT CHANGE UP (ref 22–31)
CREAT SERPL-MCNC: 0.63 MG/DL — SIGNIFICANT CHANGE UP (ref 0.5–1.3)
CREAT SERPL-MCNC: 0.72 MG/DL — SIGNIFICANT CHANGE UP (ref 0.5–1.3)
CULTURE RESULTS: SIGNIFICANT CHANGE UP
EGFR: 80 ML/MIN/1.73M2 — SIGNIFICANT CHANGE UP
EGFR: 85 ML/MIN/1.73M2 — SIGNIFICANT CHANGE UP
GLUCOSE SERPL-MCNC: 101 MG/DL — HIGH (ref 70–99)
HCT VFR BLD CALC: 41.9 % — SIGNIFICANT CHANGE UP (ref 34.5–45)
HDLC SERPL-MCNC: 55 MG/DL — SIGNIFICANT CHANGE UP
HGB BLD-MCNC: 13.7 G/DL — SIGNIFICANT CHANGE UP (ref 11.5–15.5)
INR BLD: 1.11 RATIO — SIGNIFICANT CHANGE UP (ref 0.88–1.16)
LIPID PNL WITH DIRECT LDL SERPL: 93 MG/DL — SIGNIFICANT CHANGE UP
MCHC RBC-ENTMCNC: 30.9 PG — SIGNIFICANT CHANGE UP (ref 27–34)
MCHC RBC-ENTMCNC: 32.7 GM/DL — SIGNIFICANT CHANGE UP (ref 32–36)
MCV RBC AUTO: 94.4 FL — SIGNIFICANT CHANGE UP (ref 80–100)
NON HDL CHOLESTEROL: 107 MG/DL — SIGNIFICANT CHANGE UP
NRBC # BLD: 0 /100 WBCS — SIGNIFICANT CHANGE UP (ref 0–0)
PLATELET # BLD AUTO: 177 K/UL — SIGNIFICANT CHANGE UP (ref 150–400)
POTASSIUM SERPL-MCNC: 3.5 MMOL/L — SIGNIFICANT CHANGE UP (ref 3.5–5.3)
POTASSIUM SERPL-SCNC: 3.5 MMOL/L — SIGNIFICANT CHANGE UP (ref 3.5–5.3)
PROT SERPL-MCNC: 6.3 G/DL — SIGNIFICANT CHANGE UP (ref 6–8.3)
PROT SERPL-MCNC: 6.4 G/DL — SIGNIFICANT CHANGE UP (ref 6–8.3)
PROTHROM AB SERPL-ACNC: 13 SEC — SIGNIFICANT CHANGE UP (ref 10.5–13.4)
RBC # BLD: 4.44 M/UL — SIGNIFICANT CHANGE UP (ref 3.8–5.2)
RBC # FLD: 14.1 % — SIGNIFICANT CHANGE UP (ref 10.3–14.5)
SODIUM SERPL-SCNC: 136 MMOL/L — SIGNIFICANT CHANGE UP (ref 135–145)
SPECIMEN SOURCE: SIGNIFICANT CHANGE UP
TRIGL SERPL-MCNC: 69 MG/DL — SIGNIFICANT CHANGE UP
WBC # BLD: 5.45 K/UL — SIGNIFICANT CHANGE UP (ref 3.8–10.5)
WBC # FLD AUTO: 5.45 K/UL — SIGNIFICANT CHANGE UP (ref 3.8–10.5)

## 2022-10-22 RX ORDER — REMDESIVIR 5 MG/ML
100 INJECTION INTRAVENOUS EVERY 24 HOURS
Refills: 0 | Status: COMPLETED | OUTPATIENT
Start: 2022-10-23 | End: 2022-10-24

## 2022-10-22 RX ADMIN — ATORVASTATIN CALCIUM 20 MILLIGRAM(S): 80 TABLET, FILM COATED ORAL at 22:00

## 2022-10-22 RX ADMIN — REMDESIVIR 500 MILLIGRAM(S): 5 INJECTION INTRAVENOUS at 06:50

## 2022-10-22 RX ADMIN — Medication 6 MILLIGRAM(S): at 06:49

## 2022-10-22 RX ADMIN — Medication 240 MILLIGRAM(S): at 06:50

## 2022-10-22 RX ADMIN — Medication 25 MILLIGRAM(S): at 06:11

## 2022-10-22 RX ADMIN — Medication 120 MILLIGRAM(S): at 22:00

## 2022-10-22 RX ADMIN — Medication 20 MILLIGRAM(S): at 12:15

## 2022-10-22 RX ADMIN — MEMANTINE HYDROCHLORIDE 10 MILLIGRAM(S): 10 TABLET ORAL at 12:15

## 2022-10-22 NOTE — PROGRESS NOTE ADULT - ASSESSMENT
89-year-old female past medical history of of A.fib not on AC hyperlipidemia dementia here for generalized weakness cough for last 2 days.  Patient tested positive for COVID today.  Patient lives with daughter unable to get out of bed dressed to eat usually walks with a walker unable to walk today.  Daughter states patient has been more lethargic.  No trauma or falls no chest pain or shortness of breath.    1 covid 19  - cw remdesivir  - dc  decadron, doing well on RA   - supportive care    2 Afib  - not on AC now sec to fall risk  - cw diltiazem  - cw toprol  - cards to follow    3 HLD  - cw statin    4 Lovenox for DVT prophylaxis

## 2022-10-22 NOTE — ED ADULT NURSE REASSESSMENT NOTE - COMFORT CARE
meal provided/po fluids offered/repositioned/side rails up/wait time explained/warm blanket provided
meal provided/po fluids offered/repositioned/side rails up/wait time explained/warm blanket provided

## 2022-10-22 NOTE — PATIENT PROFILE ADULT - FALL HARM RISK - HARM RISK INTERVENTIONS
Assistance with ambulation/Assistance OOB with selected safe patient handling equipment/Communicate Risk of Fall with Harm to all staff/Discuss with provider need for PT consult/Monitor for mental status changes/Monitor gait and stability/Move patient closer to nurses' station/Provide patient with walking aids - walker, cane, crutches/Reinforce activity limits and safety measures with patient and family/Reorient to person, place and time as needed/Tailored Fall Risk Interventions/Toileting schedule using arm’s reach rule for commode and bathroom/Use of alarms - bed, chair and/or voice tab/Visual Cue: Yellow wristband and red socks/Bed in lowest position, wheels locked, appropriate side rails in place/Call bell, personal items and telephone in reach/Instruct patient to call for assistance before getting out of bed or chair/Non-slip footwear when patient is out of bed/[place] to call system/Physically safe environment - no spills, clutter or unnecessary equipment/Purposeful Proactive Rounding/Room/bathroom lighting operational, light cord in reach

## 2022-10-22 NOTE — CONSULT NOTE ADULT - ASSESSMENT
89-year-old female past medical history of of A. fib not on AC hyperlipidemia dementia here for generalized weakness cough    covid  weakness  OP  OA  AF  Cough  HLD  Dementia    on remdesivir for covid  cxr noted  labs reviewed  dvt p  assist with needs  isolation precs  oral hygiene  acap prn  robitussin prn  ID eval  will dc Decadron if pt is tolerating ROOM AIR  cvs rx regimen and BP control - hx of AF - not on AC

## 2022-10-22 NOTE — CONSULT NOTE ADULT - SUBJECTIVE AND OBJECTIVE BOX
HPI:  88YO F PMH Dementia, Afib on coumadin, pacemaker, osteopenia, right hip fracture sp right THR 21 who presented from home sec weakness and cough over past few days. Daughter noticed pt was more weak and confused. In ED COVID 19 +. Afebrile. On RA. Pt is vaccinated and boostered.    Infectious Disease consult was called to evaluate pt.      Past Medical & Surgical Hx:  PAST MEDICAL & SURGICAL HISTORY:  Osteopenia  High cholesterol  Atrial fibrillation      Social History--  EtOH: denies  Tobacco: denies   Drug Use: denies    FAMILY HISTORY:  No pertinent family history in first degree relatives      Allergies  No Known Allergies    Intolerances  NONE    Home Medications:  DilTIAZem (Eqv-Cardizem CD) 120 mg/24 hours oral capsule, extended release: 2 capsule(s) orally once daily in the morning and then 1 capsule orally once in the evening. (09 Sep 2021 16:33)  enoxaparin: 55 unit(s) subcutaneous 2 times a day    please stop once INR above 2  (15 Sep 2021 15:07)  folic acid 1 mg oral tablet: 1 tab(s) orally once a day (09 Sep 2021 16:33)  melatonin 3 mg oral tablet: 1 tab(s) orally once a day (at bedtime), As needed, Insomnia (15 Sep 2021 15:07)  memantine 10 mg oral tablet: 1 tab(s) orally once a day (09 Sep 2021 16:33)  multivitamin: 1 tab(s) orally once a day (09 Sep 2021 16:33)  oxyCODONE 5 mg oral tablet: 1 tab(s) orally every 4 hours, As needed, Severe Pain (7 - 10) (15 Sep 2021 15:07)  PARoxetine 20 mg oral tablet: 1 tab(s) orally once a day (09 Sep 2021 16:33)  pravastatin 20 mg oral tablet: 1 tab(s) orally once a day (09 Sep 2021 16:33)  Toprol-XL 25 mg oral tablet, extended release: 1 tab(s) orally once a day (09 Sep 2021 16:33)  traMADol 50 mg oral tablet: 1 tab(s) orally every 6 hours, As needed, Mild Pain (1 - 3) (15 Sep 2021 15:07)  warfarin 10 mg oral tablet: 1 tab(s) orally once a day (09 Sep 2021 16:33)      Current Inpatient Medications :    ANTIBIOTICS:   remdesivir  IVPB   IV Intermittent       OTHER RELEVANT MEDICATIONS :  atorvastatin 20 milliGRAM(s) Oral at bedtime  diltiazem    milliGRAM(s) Oral daily  diltiazem    milliGRAM(s) Oral daily  enoxaparin Injectable 40 milliGRAM(s) SubCutaneous every 24 hours  guaiFENesin Oral Liquid (Sugar-Free) 200 milliGRAM(s) Oral every 6 hours PRN  melatonin 3 milliGRAM(s) Oral at bedtime PRN  memantine 10 milliGRAM(s) Oral daily  metoprolol succinate ER 25 milliGRAM(s) Oral daily  oxyCODONE    IR 5 milliGRAM(s) Oral every 4 hours PRN  PARoxetine 20 milliGRAM(s) Oral daily      ROS:  Unable to obtain due to : dementia      Physical Exam:  Vital Signs Last 24 Hrs  T(C): 36.9 (22 Oct 2022 13:50), Max: 36.9 (21 Oct 2022 21:18)  T(F): 98.5 (22 Oct 2022 13:50), Max: 98.5 (22 Oct 2022 13:50)  HR: 85 (22 Oct 2022 13:50) (85 - 98)  BP: 123/63 (22 Oct 2022 13:50) (123/63 - 170/80)  RR: 18 (22 Oct 2022 13:50) (16 - 18)  SpO2: 100% (22 Oct 2022 13:50) (95% - 100%)    Parameters below as of 22 Oct 2022 13:50  Patient On (Oxygen Delivery Method): room air      General: Plesantly confused no acute distress  Neck: supple, trachea midline  Lungs: clear, no wheeze/rhonchi  Cardiovascular: regular rate and rhythm, S1 S2  Abdomen: soft, nontender, ND, bowel sounds normal  Neurological:  alert confused  Skin: no rash  Extremities: no edema    Labs:                         13.7   5.45  )-----------( 177      ( 22 Oct 2022 10:23 )             41.9   10-22    136  |  102  |  10  ----------------------------<  101<H>  3.5   |  25  |  0.63    Ca    8.1<L>      22 Oct 2022 10:23    TPro  6.3  /  Alb  3.0<L>  /  TBili  0.4  /  DBili  0.1  /  AST  31  /  ALT  26  /  AlkPhos  64  10-22    Urinalysis Basic - ( 21 Oct 2022 15:30 )    Color: Yellow / Appearance: Clear / S.020 / pH: x  Gluc: x / Ketone: Negative  / Bili: Negative / Urobili: Negative   Blood: x / Protein: 30 mg/dL / Nitrite: Negative   Leuk Esterase: Negative / RBC: 0-2 /HPF / WBC 0-2   Sq Epi: x / Non Sq Epi: Occasional / Bacteria: Occasional      RECENT CULTURES:  COVID-19 PCR (10.21.22 @ 13:35)    COVID-19 PCR: Detected      RADIOLOGY & ADDITIONAL STUDIES:    Assessment :   88YO F PMH Dementia, Afib on coumadin, pacemaker, osteopenia, right hip fracture sp right THR 21 admitted with weakness and cough sec COVID 19 infection Afebrile. On RA. Pt is vaccinated and boostered.      Plan :   Remdesivir x 3 days  Trend temps and cbc  Fu cultures  Trend inflammatory biomarkers  Asp precautions    Continue with present regiment .  Approptiate use of antibiotics and adverse effects reviewed.      I have discussed the above plan of care with patient's daughter in detail. She expressed understanding of the treatment plan . Risks, benefits and alternatives discussed in detail. I have asked if she has any questions or concerns and appropriately addressed them to the best of my ability .      > 45 minutes spent in direct patient care reviewing  the notes, lab data/ imaging , discussion with multidisciplinary team. All questions were addressed and answered to the best of my capacity .    Thank you for allowing me to participate in the care of your patient .      Mushtaq Griggs MD  Infectious Disease  [phone]
History of Present Illness: The patient is an 89 year old female with a history of HL, atrial fibrillation, dementia who presents with weakness. History obtained from chart as patient is a poor historian with COVID. The patient has been more lethargic, weak, decreased PO intake.    Past Medical/Surgical History:  HL, atrial fibrillation, dementia     Medications:  DilTIAZem (Eqv-Cardizem CD) 120 mg/24 hours oral capsule, extended release: 2 capsule(s) orally once daily in the morning and then 1 capsule orally once in the evening.  enoxaparin: 55 unit(s) subcutaneous 2 times a day    please stop once INR above 2   folic acid 1 mg oral tablet: 1 tab(s) orally once a day  melatonin 3 mg oral tablet: 1 tab(s) orally once a day (at bedtime), As needed, Insomnia  memantine 10 mg oral tablet: 1 tab(s) orally once a day  multivitamin: 1 tab(s) orally once a day  oxyCODONE 5 mg oral tablet: 1 tab(s) orally every 4 hours, As needed, Severe Pain (7 - 10)  PARoxetine 20 mg oral tablet: 1 tab(s) orally once a day  pravastatin 20 mg oral tablet: 1 tab(s) orally once a day  Toprol-XL 25 mg oral tablet, extended release: 1 tab(s) orally once a day  traMADol 50 mg oral tablet: 1 tab(s) orally every 6 hours, As needed, Mild Pain (1 - 3)  warfarin 10 mg oral tablet: 1 tab(s) orally once a day      Family History: Non-contributory family history of premature cardiovascular atherosclerotic disease    Social History: No tobacco, alcohol or drug use    Review of Systems:  Unable to obtain    Physical Exam:  Vitals:        Vital Signs Last 24 Hrs  T(C): 36.9 (21 Oct 2022 21:18), Max: 36.9 (21 Oct 2022 21:18)  T(F): 98.4 (21 Oct 2022 21:18), Max: 98.4 (21 Oct 2022 21:18)  HR: 98 (21 Oct 2022 21:18) (90 - 102)  BP: 158/81 (21 Oct 2022 21:18) (123/74 - 158/81)  BP(mean): --  RR: 18 (21 Oct 2022 21:18) (17 - 18)  SpO2: 95% (21 Oct 2022 21:18) (95% - 96%)  Parameters below as of 21 Oct 2022 21:18  Patient On (Oxygen Delivery Method): room air  General: NAD  HEENT: MMM  Neck: No JVD, no carotid bruit  Lungs: CTAB  CV: RRR, nl S1/S2, no M/R/G  Abdomen: S/NT/ND, +BS  Extremities: No LE edema, no cyanosis  Neuro: AAOx0  Skin: No rash    Labs:                        13.4   4.05  )-----------( 207      ( 21 Oct 2022 13:35 )             40.1     10-21    x   |  x   |  x   ----------------------------<  x   x    |  x   |  0.63    Ca    8.6      21 Oct 2022 13:35    TPro  6.1  /  Alb  2.8<L>  /  TBili  0.5  /  DBili  0.1  /  AST  22  /  ALT  24  /  AlkPhos  64  10-21        PT/INR - ( 21 Oct 2022 22:45 )   PT: 12.9 sec;   INR: 1.10 ratio         PTT - ( 21 Oct 2022 22:45 )  PTT:34.0 sec    ECG: AF, PVC    
Date/Time Patient Seen:  		  Referring MD:   Data Reviewed	       Patient is a 89y old  Female who presents with a chief complaint of weakness (21 Oct 2022 20:40)      Subjective/HPI  vs noted  covid pos  ID eval planned  ER provider note reviewed  H and P reviewed  labs reviewed  imaging reviewed      89-year-old female past medical history of of A. fib not on AC hyperlipidemia dementia here for generalized weakness cough for last 2 days.  Patient tested positive for COVID today.  Patient lives with daughter unable to get out of bed dressed to eat usually walks with a walker unable to walk today.  Daughter states patient has been more lethargic.  No trauma or falls no chest pain or shortness of breath.  PAST MEDICAL & SURGICAL HISTORY:  Osteopenia    High cholesterol    Atrial fibrillation    No significant past surgical history    FAMILY HISTORY:  No pertinent family history in first degree relatives. No pertinent family history of: covid.     Social History:  · Substance use	No  · Social History (marital status, living situation, occupation, and sexual history)	neg     Tobacco Screening:  · Core Measure Site	Yes  · Has the patient used tobacco in the past 30 days?	No    Risk Assessment:    Present on Admission:  Deep Venous Thrombosis	no  Pulmonary Embolus	no     HIV Screening:  · In accordance with [place] State law, we offer every patient who comes to our ED an HIV test. Would you like to be tested today?	Opt out        Medication list         MEDICATIONS  (STANDING):  atorvastatin 20 milliGRAM(s) Oral at bedtime  dexAMETHasone     Tablet 6 milliGRAM(s) Oral daily  diltiazem    milliGRAM(s) Oral daily  diltiazem    milliGRAM(s) Oral daily  enoxaparin Injectable 40 milliGRAM(s) SubCutaneous every 24 hours  memantine 10 milliGRAM(s) Oral daily  metoprolol succinate ER 25 milliGRAM(s) Oral daily  PARoxetine 20 milliGRAM(s) Oral daily  remdesivir  IVPB   IV Intermittent   remdesivir  IVPB 200 milliGRAM(s) IV Intermittent every 24 hours    MEDICATIONS  (PRN):  melatonin 3 milliGRAM(s) Oral at bedtime PRN Insomnia  oxyCODONE    IR 5 milliGRAM(s) Oral every 4 hours PRN Severe Pain (7 - 10)         Vitals log        ICU Vital Signs Last 24 Hrs  T(C): 36.9 (21 Oct 2022 21:18), Max: 36.9 (21 Oct 2022 21:18)  T(F): 98.4 (21 Oct 2022 21:18), Max: 98.4 (21 Oct 2022 21:18)  HR: 98 (21 Oct 2022 21:18) (90 - 102)  BP: 158/81 (21 Oct 2022 21:18) (123/74 - 158/81)  BP(mean): --  ABP: --  ABP(mean): --  RR: 18 (21 Oct 2022 21:18) (17 - 18)  SpO2: 95% (21 Oct 2022 21:18) (95% - 96%)    O2 Parameters below as of 21 Oct 2022 21:18  Patient On (Oxygen Delivery Method): room air                 Input and Output:  I&O's Detail      Lab Data                        13.4   4.05  )-----------( 207      ( 21 Oct 2022 13:35 )             40.1     10-21    x   |  x   |  x   ----------------------------<  x   x    |  x   |  0.63    Ca    8.6      21 Oct 2022 13:35    TPro  6.1  /  Alb  2.8<L>  /  TBili  0.5  /  DBili  0.1  /  AST  22  /  ALT  24  /  AlkPhos  64  10-21            Review of Systems	  cough    Objective     Physical Examination    heart s1s2  lung dec BS    Pertinent Lab findings & Imaging      Cynthia:  NO   Adequate UO     I&O's Detail           Discussed with:     Cultures:	        Radiology

## 2022-10-22 NOTE — CONSULT NOTE ADULT - ASSESSMENT
The patient is an 89 year old female with a history of HL, atrial fibrillation, dementia who presents with weakness/lethargy in the setting of COVID.    Plan:  - ECG with known atrial fibrillation  - Continue diltiazem  mg qam and 120 mg qpm (home dose)  - Continue metoprolol succinate 25 mg daily  - Not on anticoagulation, presumably due to dementia and prior falls  - COVID positive  - Not hypoxic  - On remdesivir and dexamethasone

## 2022-10-22 NOTE — PHYSICAL THERAPY INITIAL EVALUATION ADULT - ADDITIONAL COMMENTS
Pt severely confused 2/2 hx dementia, daughter present who states she lives with her in [facility] with no ELIZABETH, pt was using RW for ambulation independently although required assist with ADL's.

## 2022-10-23 LAB
ALBUMIN SERPL ELPH-MCNC: 2.9 G/DL — LOW (ref 3.3–5)
ALP SERPL-CCNC: 65 U/L — SIGNIFICANT CHANGE UP (ref 40–120)
ALT FLD-CCNC: 47 U/L — SIGNIFICANT CHANGE UP (ref 12–78)
ANION GAP SERPL CALC-SCNC: 7 MMOL/L — SIGNIFICANT CHANGE UP (ref 5–17)
AST SERPL-CCNC: 70 U/L — HIGH (ref 15–37)
BILIRUB DIRECT SERPL-MCNC: 0.1 MG/DL — SIGNIFICANT CHANGE UP (ref 0–0.3)
BILIRUB INDIRECT FLD-MCNC: 0.3 MG/DL — SIGNIFICANT CHANGE UP (ref 0.2–1)
BILIRUB SERPL-MCNC: 0.4 MG/DL — SIGNIFICANT CHANGE UP (ref 0.2–1.2)
BUN SERPL-MCNC: 25 MG/DL — HIGH (ref 7–23)
CALCIUM SERPL-MCNC: 8.4 MG/DL — LOW (ref 8.5–10.1)
CHLORIDE SERPL-SCNC: 103 MMOL/L — SIGNIFICANT CHANGE UP (ref 96–108)
CO2 SERPL-SCNC: 26 MMOL/L — SIGNIFICANT CHANGE UP (ref 22–31)
CREAT SERPL-MCNC: 0.62 MG/DL — SIGNIFICANT CHANGE UP (ref 0.5–1.3)
CREAT SERPL-MCNC: 0.86 MG/DL — SIGNIFICANT CHANGE UP (ref 0.5–1.3)
EGFR: 65 ML/MIN/1.73M2 — SIGNIFICANT CHANGE UP
EGFR: 85 ML/MIN/1.73M2 — SIGNIFICANT CHANGE UP
GLUCOSE SERPL-MCNC: 96 MG/DL — SIGNIFICANT CHANGE UP (ref 70–99)
INR BLD: 1.08 RATIO — SIGNIFICANT CHANGE UP (ref 0.88–1.16)
POTASSIUM SERPL-MCNC: 3.8 MMOL/L — SIGNIFICANT CHANGE UP (ref 3.5–5.3)
POTASSIUM SERPL-SCNC: 3.8 MMOL/L — SIGNIFICANT CHANGE UP (ref 3.5–5.3)
PROT SERPL-MCNC: 6.4 G/DL — SIGNIFICANT CHANGE UP (ref 6–8.3)
PROTHROM AB SERPL-ACNC: 12.6 SEC — SIGNIFICANT CHANGE UP (ref 10.5–13.4)
SODIUM SERPL-SCNC: 136 MMOL/L — SIGNIFICANT CHANGE UP (ref 135–145)

## 2022-10-23 RX ORDER — METOPROLOL TARTRATE 50 MG
5 TABLET ORAL ONCE
Refills: 0 | Status: DISCONTINUED | OUTPATIENT
Start: 2022-10-23 | End: 2022-10-25

## 2022-10-23 RX ORDER — POTASSIUM CHLORIDE 20 MEQ
40 PACKET (EA) ORAL EVERY 4 HOURS
Refills: 0 | Status: DISCONTINUED | OUTPATIENT
Start: 2022-10-23 | End: 2022-10-23

## 2022-10-23 RX ORDER — METOPROLOL TARTRATE 50 MG
5 TABLET ORAL EVERY 6 HOURS
Refills: 0 | Status: DISCONTINUED | OUTPATIENT
Start: 2022-10-23 | End: 2022-10-23

## 2022-10-23 RX ORDER — DEXTROSE MONOHYDRATE, SODIUM CHLORIDE, AND POTASSIUM CHLORIDE 50; .745; 4.5 G/1000ML; G/1000ML; G/1000ML
1000 INJECTION, SOLUTION INTRAVENOUS
Refills: 0 | Status: DISCONTINUED | OUTPATIENT
Start: 2022-10-23 | End: 2022-10-23

## 2022-10-23 RX ORDER — POTASSIUM CHLORIDE 20 MEQ
10 PACKET (EA) ORAL
Refills: 0 | Status: DISCONTINUED | OUTPATIENT
Start: 2022-10-23 | End: 2022-10-23

## 2022-10-23 RX ADMIN — OXYCODONE HYDROCHLORIDE 5 MILLIGRAM(S): 5 TABLET ORAL at 10:30

## 2022-10-23 RX ADMIN — ATORVASTATIN CALCIUM 20 MILLIGRAM(S): 80 TABLET, FILM COATED ORAL at 21:15

## 2022-10-23 RX ADMIN — OXYCODONE HYDROCHLORIDE 5 MILLIGRAM(S): 5 TABLET ORAL at 09:32

## 2022-10-23 RX ADMIN — ENOXAPARIN SODIUM 40 MILLIGRAM(S): 100 INJECTION SUBCUTANEOUS at 21:15

## 2022-10-23 RX ADMIN — OXYCODONE HYDROCHLORIDE 5 MILLIGRAM(S): 5 TABLET ORAL at 14:12

## 2022-10-23 RX ADMIN — REMDESIVIR 500 MILLIGRAM(S): 5 INJECTION INTRAVENOUS at 05:33

## 2022-10-23 RX ADMIN — OXYCODONE HYDROCHLORIDE 5 MILLIGRAM(S): 5 TABLET ORAL at 14:52

## 2022-10-23 RX ADMIN — Medication 120 MILLIGRAM(S): at 21:15

## 2022-10-23 RX ADMIN — MEMANTINE HYDROCHLORIDE 10 MILLIGRAM(S): 10 TABLET ORAL at 14:11

## 2022-10-23 RX ADMIN — Medication 25 MILLIGRAM(S): at 06:43

## 2022-10-23 RX ADMIN — Medication 20 MILLIGRAM(S): at 14:12

## 2022-10-23 NOTE — CHART NOTE - NSCHARTNOTEFT_GEN_A_CORE
Called by RN for dysphagia and inability to take morning meds. Per RN patient has had difficulty swallowing and she is concerned about patient aspirating her medications. Patient due for metoprolol succ 25mg and Cardizem CD 240mg, will hold for now and give patient 1x dose IVP Lopressor 5mg. Speech and Swallow consult placed. Primary team to transition all PO medications to IV if indicated.

## 2022-10-24 LAB
ALBUMIN SERPL ELPH-MCNC: 2.9 G/DL — LOW (ref 3.3–5)
ALP SERPL-CCNC: 64 U/L — SIGNIFICANT CHANGE UP (ref 40–120)
ALT FLD-CCNC: 64 U/L — SIGNIFICANT CHANGE UP (ref 12–78)
ANION GAP SERPL CALC-SCNC: 8 MMOL/L — SIGNIFICANT CHANGE UP (ref 5–17)
AST SERPL-CCNC: 72 U/L — HIGH (ref 15–37)
BILIRUB DIRECT SERPL-MCNC: 0.2 MG/DL — SIGNIFICANT CHANGE UP (ref 0–0.3)
BILIRUB INDIRECT FLD-MCNC: 0.3 MG/DL — SIGNIFICANT CHANGE UP (ref 0.2–1)
BILIRUB SERPL-MCNC: 0.5 MG/DL — SIGNIFICANT CHANGE UP (ref 0.2–1.2)
BUN SERPL-MCNC: 23 MG/DL — SIGNIFICANT CHANGE UP (ref 7–23)
CALCIUM SERPL-MCNC: 7.9 MG/DL — LOW (ref 8.5–10.1)
CHLORIDE SERPL-SCNC: 102 MMOL/L — SIGNIFICANT CHANGE UP (ref 96–108)
CO2 SERPL-SCNC: 28 MMOL/L — SIGNIFICANT CHANGE UP (ref 22–31)
CREAT SERPL-MCNC: 0.8 MG/DL — SIGNIFICANT CHANGE UP (ref 0.5–1.3)
EGFR: 70 ML/MIN/1.73M2 — SIGNIFICANT CHANGE UP
GLUCOSE SERPL-MCNC: 82 MG/DL — SIGNIFICANT CHANGE UP (ref 70–99)
INR BLD: 1.14 RATIO — SIGNIFICANT CHANGE UP (ref 0.88–1.16)
POTASSIUM SERPL-MCNC: 3.5 MMOL/L — SIGNIFICANT CHANGE UP (ref 3.5–5.3)
POTASSIUM SERPL-SCNC: 3.5 MMOL/L — SIGNIFICANT CHANGE UP (ref 3.5–5.3)
PROT SERPL-MCNC: 6.4 G/DL — SIGNIFICANT CHANGE UP (ref 6–8.3)
PROTHROM AB SERPL-ACNC: 13.3 SEC — SIGNIFICANT CHANGE UP (ref 10.5–13.4)
SODIUM SERPL-SCNC: 138 MMOL/L — SIGNIFICANT CHANGE UP (ref 135–145)

## 2022-10-24 RX ADMIN — Medication 240 MILLIGRAM(S): at 05:07

## 2022-10-24 RX ADMIN — MEMANTINE HYDROCHLORIDE 10 MILLIGRAM(S): 10 TABLET ORAL at 11:33

## 2022-10-24 RX ADMIN — ATORVASTATIN CALCIUM 20 MILLIGRAM(S): 80 TABLET, FILM COATED ORAL at 21:15

## 2022-10-24 RX ADMIN — Medication 20 MILLIGRAM(S): at 11:33

## 2022-10-24 RX ADMIN — Medication 120 MILLIGRAM(S): at 20:32

## 2022-10-24 RX ADMIN — Medication 25 MILLIGRAM(S): at 05:07

## 2022-10-24 RX ADMIN — Medication 200 MILLIGRAM(S): at 05:07

## 2022-10-24 RX ADMIN — Medication 3 MILLIGRAM(S): at 21:15

## 2022-10-24 RX ADMIN — REMDESIVIR 500 MILLIGRAM(S): 5 INJECTION INTRAVENOUS at 05:19

## 2022-10-24 RX ADMIN — ENOXAPARIN SODIUM 40 MILLIGRAM(S): 100 INJECTION SUBCUTANEOUS at 21:15

## 2022-10-24 NOTE — PROGRESS NOTE ADULT - ASSESSMENT
The patient is an 89 year old female with a history of HL, atrial fibrillation, dementia who presents with weakness/lethargy in the setting of COVID.    Plan:  - ECG with known atrial fibrillation  - Continue diltiazem  mg qam and 120 mg qpm (home dose)  - Continue metoprolol succinate 25 mg daily  - Not on anticoagulation, presumably due to dementia and prior falls  - COVID positive  - Not hypoxic  - On remdesivir and dexamethasone The patient is an 89 year old female with a history of HL, atrial fibrillation, dementia who presents with weakness/lethargy in the setting of COVID. She appears confused but in good spirits with good appetite.    Plan:  - ECG with known atrial fibrillation  - Continue diltiazem  mg qam and 120 mg qpm (home dose)  - Continue metoprolol succinate 25 mg daily  - Not on anticoagulation, presumably due to dementia and prior falls  - COVID positive  - Not hypoxic  - On remdesivir and dexamethasone  - DVT prophylaxis

## 2022-10-24 NOTE — PROGRESS NOTE ADULT - ASSESSMENT
89-year-old female past medical history of of A. fib not on AC hyperlipidemia dementia here for generalized weakness cough    covid  weakness  OP  OA  AF  Cough  HLD  Dementia    concern for Dysphagia - am events noted  SLP eval -   vs noted  labs reviewed  ID eval noted  remains on Remdesivir -     on remdesivir for covid  cxr noted  labs reviewed  dvt p  assist with needs  isolation precs  oral hygiene  acap prn  robitussin prn  ID eval  [facility] rx regimen and BP control - hx of AF - not on AC

## 2022-10-24 NOTE — PROGRESS NOTE ADULT - ASSESSMENT
89-year-old female past medical history of of A.fib not on AC hyperlipidemia dementia here for generalized weakness cough for last 2 days.  Patient tested positive for COVID today.  Patient lives with daughter unable to get out of bed dressed to eat usually walks with a walker unable to walk today.  Daughter states patient has been more lethargic.  No trauma or falls no chest pain or shortness of breath.    1 covid 19  - cw remdesivir  - dc  decadron, doing well on RA   - supportive care    2 Afib  - not on AC now sec to fall risk  - cw diltiazem  - cw toprol  - cards to follow    3 HLD  - cw statin    4 Dysphagia  - check speech and swallow     Lovenox for DVT prophylaxis

## 2022-10-25 ENCOUNTER — TRANSCRIPTION ENCOUNTER (OUTPATIENT)
Age: 87
End: 2022-10-25

## 2022-10-25 VITALS
SYSTOLIC BLOOD PRESSURE: 107 MMHG | DIASTOLIC BLOOD PRESSURE: 72 MMHG | RESPIRATION RATE: 17 BRPM | HEART RATE: 63 BPM | TEMPERATURE: 98 F | OXYGEN SATURATION: 96 %

## 2022-10-25 LAB
ALBUMIN SERPL ELPH-MCNC: 2.8 G/DL — LOW (ref 3.3–5)
ALP SERPL-CCNC: 64 U/L — SIGNIFICANT CHANGE UP (ref 40–120)
ALT FLD-CCNC: 47 U/L — SIGNIFICANT CHANGE UP (ref 12–78)
AST SERPL-CCNC: 43 U/L — HIGH (ref 15–37)
BILIRUB DIRECT SERPL-MCNC: 0.1 MG/DL — SIGNIFICANT CHANGE UP (ref 0–0.3)
BILIRUB INDIRECT FLD-MCNC: 0.3 MG/DL — SIGNIFICANT CHANGE UP (ref 0.2–1)
BILIRUB SERPL-MCNC: 0.4 MG/DL — SIGNIFICANT CHANGE UP (ref 0.2–1.2)
CREAT SERPL-MCNC: 0.71 MG/DL — SIGNIFICANT CHANGE UP (ref 0.5–1.3)
EGFR: 81 ML/MIN/1.73M2 — SIGNIFICANT CHANGE UP
INR BLD: 1.14 RATIO — SIGNIFICANT CHANGE UP (ref 0.88–1.16)
PROT SERPL-MCNC: 6 G/DL — SIGNIFICANT CHANGE UP (ref 6–8.3)
PROTHROM AB SERPL-ACNC: 13.3 SEC — SIGNIFICANT CHANGE UP (ref 10.5–13.4)

## 2022-10-25 RX ORDER — WARFARIN SODIUM 2.5 MG/1
1 TABLET ORAL
Qty: 0 | Refills: 0 | DISCHARGE

## 2022-10-25 RX ADMIN — MEMANTINE HYDROCHLORIDE 10 MILLIGRAM(S): 10 TABLET ORAL at 11:05

## 2022-10-25 RX ADMIN — Medication 20 MILLIGRAM(S): at 11:05

## 2022-10-25 RX ADMIN — Medication 240 MILLIGRAM(S): at 05:17

## 2022-10-25 RX ADMIN — Medication 25 MILLIGRAM(S): at 05:17

## 2022-10-25 NOTE — DISCHARGE NOTE PROVIDER - CARE PROVIDER_API CALL
ERWIN COOK  Internal Medicine  [address], [address]  [place] [address]  Phone: [phone]  Fax: ([phone]  Follow Up Time:

## 2022-10-25 NOTE — DISCHARGE NOTE PROVIDER - DETAILS OF MALNUTRITION DIAGNOSIS/DIAGNOSES
This patient has been assessed with a concern for Malnutrition and was treated during this hospitalization for the following Nutrition diagnosis/diagnoses:     -  10/25/2022: Severe protein-calorie malnutrition

## 2022-10-25 NOTE — SWALLOW BEDSIDE ASSESSMENT ADULT - SLP PERTINENT HISTORY OF CURRENT PROBLEM
Per charting, "89-year-old female past medical history of of Isabel not on AC hyperlipidemia dementia here for generalized weakness cough for last 2 days.  Patient tested positive for COVID today.  Patient lives with daughter unable to get out of bed dressed to eat usually walks with a walker unable to walk today.  Daughter states patient has been more lethargic.  No trauma or falls no chest pain or shortness of breath."

## 2022-10-25 NOTE — DISCHARGE NOTE PROVIDER - NSDCCPCAREPLAN_GEN_ALL_CORE_FT
PRINCIPAL DISCHARGE DIAGNOSIS  Diagnosis: Adult failure to thrive  Assessment and Plan of Treatment:

## 2022-10-25 NOTE — PROGRESS NOTE ADULT - SUBJECTIVE AND OBJECTIVE BOX
SADIA CRUZ is a 89yFemale , patient examined and chart reviewed.    INTERVAL HPI/ OVERNIGHT EVENTS:   NAD. Confused.  Afebrile.    PAST MEDICAL & SURGICAL HISTORY:  Osteopenia  High cholesterol  Atrial fibrillation      For details regarding the patient's social history, family history, and other miscellaneous elements, please refer the initial infectious diseases consultation and/or the admitting history and physical examination for this admission.    ROS:  Unable to obtain due to : Dementia      Current inpatient medications :    ANTIBIOTICS/RELEVANT:  remdesivir  IVPB 100 milliGRAM(s) IV Intermittent every 24 hours  remdesivir  IVPB   IV Intermittent       atorvastatin 20 milliGRAM(s) Oral at bedtime  diltiazem    milliGRAM(s) Oral daily  diltiazem    milliGRAM(s) Oral daily  enoxaparin Injectable 40 milliGRAM(s) SubCutaneous every 24 hours  guaiFENesin Oral Liquid (Sugar-Free) 200 milliGRAM(s) Oral every 6 hours PRN  melatonin 3 milliGRAM(s) Oral at bedtime PRN  memantine 10 milliGRAM(s) Oral daily  metoprolol succinate ER 25 milliGRAM(s) Oral daily  metoprolol tartrate Injectable 5 milliGRAM(s) IV Push once  oxyCODONE    IR 5 milliGRAM(s) Oral every 4 hours PRN  PARoxetine 20 milliGRAM(s) Oral daily      Objective:    10-22 @ 07:01  -  10-23 @ 07:00  --------------------------------------------------------  IN: 250 mL / OUT: 0 mL / NET: 250 mL    10-23 @ 07:01  -  10-23 @ 21:24  --------------------------------------------------------  IN: 240 mL / OUT: 0 mL / NET: 240 mL      T(C): 36.8 (10-23-22 @ 20:17), Max: 36.8 (10-23-22 @ 20:17)  HR: 79 (10-23-22 @ 20:17) (79 - 98)  BP: 120/79 (10-23-22 @ 20:17) (118/87 - 131/78)  RR: 16 (10-23-22 @ 20:17) (15 - 18)  SpO2: 97% (10-23-22 @ 20:17) (97% - 99%)      Physical Exam:  General: no acute distress  Neck: supple, trachea midline  Lungs: clear, no wheeze/rhonchi  Cardiovascular: regular rate and rhythm, S1 S2  Abdomen: soft, nontender,  bowel sounds normal  Neurological: awake  Skin: no rash  Extremities: no edema      LABS:                          13.7   5.45  )-----------( 177      ( 22 Oct 2022 10:23 )             41.9       10-23    136  |  103  |  25<H>  ----------------------------<  96  3.8   |  26  |  0.86    Ca    8.4<L>      23 Oct 2022 18:30    TPro  6.4  /  Alb  2.9<L>  /  TBili  0.4  /  DBili  0.1  /  AST  70<H>  /  ALT  47  /  AlkPhos  65  10-23      PT/INR - ( 23 Oct 2022 08:32 )   PT: 12.6 sec;   INR: 1.08 ratio         PTT - ( 21 Oct 2022 22:45 )  PTT:34.0 sec      MICROBIOLOGY:    Culture - Urine (collected 21 Oct 2022 15:30)  Source: Clean Catch Clean Catch (Midstream)  Final Report (22 Oct 2022 22:02):    >=3 organisms. Probable collection contamination    RADIOLOGY & ADDITIONAL STUDIES:    Assessment :  89YO F PMH Dementia, Afib on coumadin, pacemaker, osteopenia, right hip fracture sp right THR 9/11/21 admitted with weakness and cough sec COVID 19 infection   Afebrile. On RA. Pt is vaccinated and boostered.  Clinically stable    Plan :   Remdesivir x 3 days  Trend temps and cbc  Asp precautions  Stable from ID standpoint    Continue with present regiment.  Appropriate use of antibiotics and adverse effects reviewed.    > 35 minutes were spent in direct patient care reviewing notes, medications ,labs data/ imaging , discussion with multidisciplinary team.    Thank you for allowing me to participate in care of your patient .    Mushtaq Griggs MD  Infectious Disease  [phone]
     SADIA CRUZ is a 89yFemale , patient examined and chart reviewed.    INTERVAL HPI/ OVERNIGHT EVENTS:   NAD. Confused.  Afebrile. No events    PAST MEDICAL & SURGICAL HISTORY:  Osteopenia  High cholesterol  Atrial fibrillation      For details regarding the patient's social history, family history, and other miscellaneous elements, please refer the initial infectious diseases consultation and/or the admitting history and physical examination for this admission.    ROS:  Unable to obtain due to : Dementia      Current inpatient medications :    ANTIBIOTICS/RELEVANT:  remdesivir  IVPB 100 milliGRAM(s) IV Intermittent every 24 hours  remdesivir  IVPB   IV Intermittent       MEDICATIONS  (STANDING):  atorvastatin 20 milliGRAM(s) Oral at bedtime  diltiazem    milliGRAM(s) Oral daily  diltiazem    milliGRAM(s) Oral daily  enoxaparin Injectable 40 milliGRAM(s) SubCutaneous every 24 hours  memantine 10 milliGRAM(s) Oral daily  metoprolol succinate ER 25 milliGRAM(s) Oral daily  metoprolol tartrate Injectable 5 milliGRAM(s) IV Push once  PARoxetine 20 milliGRAM(s) Oral daily      MEDICATIONS  (PRN):  guaiFENesin Oral Liquid (Sugar-Free) 200 milliGRAM(s) Oral every 6 hours PRN Cough  melatonin 3 milliGRAM(s) Oral at bedtime PRN Insomnia  oxyCODONE    IR 5 milliGRAM(s) Oral every 4 hours PRN Severe Pain (7 - 10)        Objective:  Vital Signs Last 24 Hrs  T(C): 36.6 (24 Oct 2022 19:31), Max: 36.8 (24 Oct 2022 05:04)  T(F): 97.9 (24 Oct 2022 19:31), Max: 98.2 (24 Oct 2022 05:04)  HR: 93 (24 Oct 2022 19:31) (68 - 93)  BP: 122/82 (24 Oct 2022 19:31) (122/82 - 156/89)  RR: 17 (24 Oct 2022 19:31) (15 - 17)  SpO2: 96% (24 Oct 2022 19:31) (96% - 98%)    Parameters below as of 24 Oct 2022 19:31  Patient On (Oxygen Delivery Method): room air      Physical Exam:  General: no acute distress  Neck: supple, trachea midline  Lungs: clear, no wheeze/rhonchi  Cardiovascular: regular rate and rhythm, S1 S2  Abdomen: soft, nontender,  bowel sounds normal  Neurological: awake  Skin: no rash  Extremities: no edema      LABS:  10-24    138  |  102  |  23  ----------------------------<  82  3.5   |  28  |  0.80    Ca    7.9<L>      24 Oct 2022 06:10    TPro  6.4  /  Alb  2.9<L>  /  TBili  0.5  /  DBili  0.2  /  AST  72<H>  /  ALT  64  /  AlkPhos  64  10-24        MICROBIOLOGY:    Culture - Urine (collected 21 Oct 2022 15:30)  Source: Clean Catch Clean Catch (Midstream)  Final Report (22 Oct 2022 22:02):    >=3 organisms. Probable collection contamination    RADIOLOGY & ADDITIONAL STUDIES:    Assessment :  88YO F PMH Dementia, Afib on coumadin, pacemaker, osteopenia, right hip fracture sp right THR 9/11/21 admitted with weakness and cough sec COVID 19 infection   Afebrile. On RA. Pt is vaccinated and boostered.  Clinically stable    Plan :   Remdesivir x 3 days (day 3/3 today)  Trend temps and cbc  Asp precautions  Stable from ID standpoint    Continue with present regiment.  Appropriate use of antibiotics and adverse effects reviewed.    > 35 minutes were spent in direct patient care reviewing notes, medications ,labs data/ imaging , discussion with multidisciplinary team.    Thank you for allowing me to participate in care of your patient .    Mushtqa Griggs MD  Infectious Disease  [phone]
Chief Complaint: Weakness    Interval Events: No events overnight.    Review of Systems:  General: No fevers, chills, weight gain  Skin: No rashes, color changes  Cardiovascular: No chest pain, orthopnea  Respiratory: No shortness of breath, cough  Gastrointestinal: No nausea, abdominal pain  Genitourinary: No incontinence, pain with urination  Musculoskeletal: No pain, swelling, decreased range of motion  Neurological: No headache, weakness  Psychiatric: No depression, anxiety  Endocrine: No weight gain, increased thirst  All other systems are comprehensively negative.    Physical Exam:  Vitals:        Vital Signs Last 24 Hrs  T(C): 36.4 (23 Oct 2022 05:29), Max: 37.2 (22 Oct 2022 17:00)  T(F): 97.6 (23 Oct 2022 05:29), Max: 98.9 (22 Oct 2022 17:00)  HR: 81 (23 Oct 2022 05:29) (81 - 85)  BP: 131/78 (23 Oct 2022 05:29) (117/74 - 132/88)  BP(mean): --  RR: 18 (23 Oct 2022 05:29) (16 - 18)  SpO2: 99% (23 Oct 2022 05:29) (98% - 100%)  Parameters below as of 23 Oct 2022 05:29  Patient On (Oxygen Delivery Method): room air  General: NAD  HEENT: MMM  Neck: No JVD, no carotid bruit  Lungs: CTAB  CV: RRR, nl S1/S2, no M/R/G  Abdomen: S/NT/ND, +BS  Extremities: No LE edema, no cyanosis  Neuro: AAOx3, non-focal  Skin: No rash    Labs:                        13.7   5.45  )-----------( 177      ( 22 Oct 2022 10:23 )             41.9     10-23    x   |  x   |  x   ----------------------------<  x   x    |  x   |  0.62    Ca    8.1<L>      22 Oct 2022 10:23    TPro  6.4  /  Alb  2.9<L>  /  TBili  0.4  /  DBili  0.1  /  AST  70<H>  /  ALT  47  /  AlkPhos  65  10-23        PT/INR - ( 23 Oct 2022 08:32 )   PT: 12.6 sec;   INR: 1.08 ratio         PTT - ( 21 Oct 2022 22:45 )  PTT:34.0 sec    Telemetry: AF
Date/Time Patient Seen:  		  Referring MD:   Data Reviewed	       Patient is a 89y old  Female who presents with a chief complaint of weakness (22 Oct 2022 15:03)      Subjective/HPI     PAST MEDICAL & SURGICAL HISTORY:  Osteopenia    High cholesterol    Atrial fibrillation    No significant past surgical history          Medication list         MEDICATIONS  (STANDING):  atorvastatin 20 milliGRAM(s) Oral at bedtime  diltiazem    milliGRAM(s) Oral daily  diltiazem    milliGRAM(s) Oral daily  enoxaparin Injectable 40 milliGRAM(s) SubCutaneous every 24 hours  memantine 10 milliGRAM(s) Oral daily  metoprolol succinate ER 25 milliGRAM(s) Oral daily  metoprolol tartrate Injectable 5 milliGRAM(s) IV Push once  PARoxetine 20 milliGRAM(s) Oral daily  remdesivir  IVPB   IV Intermittent   remdesivir  IVPB 100 milliGRAM(s) IV Intermittent every 24 hours    MEDICATIONS  (PRN):  guaiFENesin Oral Liquid (Sugar-Free) 200 milliGRAM(s) Oral every 6 hours PRN Cough  melatonin 3 milliGRAM(s) Oral at bedtime PRN Insomnia  oxyCODONE    IR 5 milliGRAM(s) Oral every 4 hours PRN Severe Pain (7 - 10)         Vitals log        ICU Vital Signs Last 24 Hrs  T(C): 36.4 (23 Oct 2022 05:29), Max: 37.2 (22 Oct 2022 17:00)  T(F): 97.6 (23 Oct 2022 05:29), Max: 98.9 (22 Oct 2022 17:00)  HR: 81 (23 Oct 2022 05:29) (81 - 89)  BP: 131/78 (23 Oct 2022 05:29) (117/74 - 170/80)  BP(mean): --  ABP: --  ABP(mean): --  RR: 18 (23 Oct 2022 05:29) (16 - 18)  SpO2: 99% (23 Oct 2022 05:29) (98% - 100%)    O2 Parameters below as of 23 Oct 2022 05:29  Patient On (Oxygen Delivery Method): room air                 Input and Output:  I&O's Detail    22 Oct 2022 07:01  -  23 Oct 2022 06:30  --------------------------------------------------------  IN:    Oral Fluid: 250 mL  Total IN: 250 mL    OUT:  Total OUT: 0 mL    Total NET: 250 mL          Lab Data                        13.7   5.45  )-----------( 177      ( 22 Oct 2022 10:23 )             41.9     10-22    136  |  102  |  10  ----------------------------<  101<H>  3.5   |  25  |  0.63    Ca    8.1<L>      22 Oct 2022 10:23    TPro  6.3  /  Alb  3.0<L>  /  TBili  0.4  /  DBili  0.1  /  AST  31  /  ALT  26  /  AlkPhos  64  10-22            Review of Systems	      Objective     Physical Examination    heart s1s2  lung dec BS  head nc      Pertinent Lab findings & Imaging      Cynthia:  NO   Adequate UO     I&O's Detail    22 Oct 2022 07:01  -  23 Oct 2022 06:30  --------------------------------------------------------  IN:    Oral Fluid: 250 mL  Total IN: 250 mL    OUT:  Total OUT: 0 mL    Total NET: 250 mL               Discussed with:     Cultures:	        Radiology                            
Date/Time Patient Seen:  		  Referring MD:   Data Reviewed	       Patient is a 89y old  Female who presents with a chief complaint of weakness (23 Oct 2022 21:24)      Subjective/HPI     PAST MEDICAL & SURGICAL HISTORY:  Osteopenia    High cholesterol    Atrial fibrillation    No significant past surgical history          Medication list         MEDICATIONS  (STANDING):  atorvastatin 20 milliGRAM(s) Oral at bedtime  diltiazem    milliGRAM(s) Oral daily  diltiazem    milliGRAM(s) Oral daily  enoxaparin Injectable 40 milliGRAM(s) SubCutaneous every 24 hours  memantine 10 milliGRAM(s) Oral daily  metoprolol succinate ER 25 milliGRAM(s) Oral daily  metoprolol tartrate Injectable 5 milliGRAM(s) IV Push once  PARoxetine 20 milliGRAM(s) Oral daily  remdesivir  IVPB 100 milliGRAM(s) IV Intermittent every 24 hours  remdesivir  IVPB   IV Intermittent     MEDICATIONS  (PRN):  guaiFENesin Oral Liquid (Sugar-Free) 200 milliGRAM(s) Oral every 6 hours PRN Cough  melatonin 3 milliGRAM(s) Oral at bedtime PRN Insomnia  oxyCODONE    IR 5 milliGRAM(s) Oral every 4 hours PRN Severe Pain (7 - 10)         Vitals log        ICU Vital Signs Last 24 Hrs  T(C): 36.8 (24 Oct 2022 05:04), Max: 36.8 (23 Oct 2022 20:17)  T(F): 98.2 (24 Oct 2022 05:04), Max: 98.2 (23 Oct 2022 20:17)  HR: 78 (24 Oct 2022 05:04) (78 - 98)  BP: 156/89 (24 Oct 2022 05:04) (118/87 - 156/89)  BP(mean): --  ABP: --  ABP(mean): --  RR: 17 (24 Oct 2022 05:04) (15 - 17)  SpO2: 98% (24 Oct 2022 05:04) (97% - 98%)    O2 Parameters below as of 24 Oct 2022 05:04  Patient On (Oxygen Delivery Method): room air                 Input and Output:  I&O's Detail    22 Oct 2022 07:01  -  23 Oct 2022 07:00  --------------------------------------------------------  IN:    Oral Fluid: 250 mL  Total IN: 250 mL    OUT:  Total OUT: 0 mL    Total NET: 250 mL      23 Oct 2022 07:01  -  24 Oct 2022 06:39  --------------------------------------------------------  IN:    Oral Fluid: 240 mL  Total IN: 240 mL    OUT:  Total OUT: 0 mL    Total NET: 240 mL          Lab Data                        13.7   5.45  )-----------( 177      ( 22 Oct 2022 10:23 )             41.9     10-23    136  |  103  |  25<H>  ----------------------------<  96  3.8   |  26  |  0.86    Ca    8.4<L>      23 Oct 2022 18:30    TPro  6.4  /  Alb  2.9<L>  /  TBili  0.4  /  DBili  0.1  /  AST  70<H>  /  ALT  47  /  AlkPhos  65  10-23            Review of Systems	      Objective     Physical Examination    heart s1s2  lung dec BS  head nc      Pertinent Lab findings & Imaging      Cynthia:  NO   Adequate UO     I&O's Detail    22 Oct 2022 07:01  -  23 Oct 2022 07:00  --------------------------------------------------------  IN:    Oral Fluid: 250 mL  Total IN: 250 mL    OUT:  Total OUT: 0 mL    Total NET: 250 mL      23 Oct 2022 07:01  -  24 Oct 2022 06:39  --------------------------------------------------------  IN:    Oral Fluid: 240 mL  Total IN: 240 mL    OUT:  Total OUT: 0 mL    Total NET: 240 mL               Discussed with:     Cultures:	        Radiology                            
[facility] Progress Note [phone] (Dr. Cortez, Ray, Dania, Miranda)    CHIEF COMPLAINT: Patient is a 89y old  Female who presents with a chief complaint of weakness (25 Oct 2022 05:59)      Follow Up Today: The patient denies any chest discomfort or shortness of breath.    HPI:   89-year-old female past medical history of of A. genna not on AC hyperlipidemia dementia here for generalized weakness cough for last 2 days.  Patient tested positive for COVID today.  Patient lives with daughter unable to get out of bed dressed to eat usually walks with a walker unable to walk today.  Daughter states patient has been more lethargic.  No trauma or falls no chest pain or shortness of breath. (21 Oct 2022 20:40)      PAST MEDICAL & SURGICAL HISTORY:  Osteopenia      High cholesterol      Atrial fibrillation          MEDICATIONS  (STANDING):  atorvastatin 20 milliGRAM(s) Oral at bedtime  diltiazem    milliGRAM(s) Oral daily  diltiazem    milliGRAM(s) Oral daily  enoxaparin Injectable 40 milliGRAM(s) SubCutaneous every 24 hours  memantine 10 milliGRAM(s) Oral daily  metoprolol succinate ER 25 milliGRAM(s) Oral daily  metoprolol tartrate Injectable 5 milliGRAM(s) IV Push once  PARoxetine 20 milliGRAM(s) Oral daily    MEDICATIONS  (PRN):  guaiFENesin Oral Liquid (Sugar-Free) 200 milliGRAM(s) Oral every 6 hours PRN Cough  melatonin 3 milliGRAM(s) Oral at bedtime PRN Insomnia  oxyCODONE    IR 5 milliGRAM(s) Oral every 4 hours PRN Severe Pain (7 - 10)      Allergies    No Known Allergies    Intolerances        REVIEW OF SYSTEMS:    All other review of systems is negative unless indicated above    Vital Signs Last 24 Hrs  T(C): 36.8 (25 Oct 2022 05:15), Max: 36.8 (25 Oct 2022 05:15)  T(F): 98.2 (25 Oct 2022 05:15), Max: 98.2 (25 Oct 2022 05:15)  HR: 73 (25 Oct 2022 05:15) (68 - 93)  BP: 147/93 (25 Oct 2022 05:15) (122/82 - 147/93)  BP(mean): --  RR: 18 (25 Oct 2022 05:15) (15 - 18)  SpO2: 94% (25 Oct 2022 05:15) (94% - 97%)    Parameters below as of 25 Oct 2022 05:15  Patient On (Oxygen Delivery Method): room air        I&O's Summary    23 Oct 2022 07:01  -  24 Oct 2022 07:00  --------------------------------------------------------  IN: 240 mL / OUT: 0 mL / NET: 240 mL        PHYSICAL EXAM:    Constitutional: NAD, awake and alert, well-developed  Eyes:  EOMI,  Pupils round, No oral cyanosis.  HEENT: No exudate or erythema  Pulmonary: Non-labored, breath sounds are clear bilaterally, No wheezing, rales or rhonchi  Cardiovascular: Regular, S1 and S2, No murmurs, rubs, gallops oir clicks  Gastrointestinal: Bowel Sounds present, soft, nontender.   Ext: No significant LE edema  Neurological: Alert, no gross focal motor deficits  Skin: No rashes.  Psych:  Mood & affect appropriate    LABS: All Labs Reviewed:                        13.7   5.45  )-----------( 177      ( 22 Oct 2022 10:23 )             41.9     24 Oct 2022 06:10    138    |  102    |  23     ----------------------------<  82     3.5     |  28     |  0.80   23 Oct 2022 18:30    136    |  103    |  25     ----------------------------<  96     3.8     |  26     |  0.86   23 Oct 2022 08:32    x      |  x      |  x      ----------------------------<  x      x       |  x      |  0.62     Ca    7.9        24 Oct 2022 06:10  Ca    8.4        23 Oct 2022 18:30  Ca    8.1        22 Oct 2022 10:23    TPro  6.4    /  Alb  2.9    /  TBili  0.5    /  DBili  0.2    /  AST  72     /  ALT  64     /  AlkPhos  64     24 Oct 2022 06:10  TPro  6.4    /  Alb  2.9    /  TBili  0.4    /  DBili  0.1    /  AST  70     /  ALT  47     /  AlkPhos  65     23 Oct 2022 08:32  TPro  6.3    /  Alb  3.0    /  TBili  0.4    /  DBili  0.1    /  AST  31     /  ALT  26     /  AlkPhos  64     22 Oct 2022 10:23    PT/INR - ( 24 Oct 2022 06:10 )   PT: 13.3 sec;   INR: 1.14 ratio               Blood Culture: Organism --  Gram Stain Blood -- Gram Stain --  Specimen Source Clean Catch Clean Catch (Midstream)  Culture-Blood --            RADIOLOGY/EKG:    Attending Attestation:   20 minutes spent on total encounter; more than 50% of the visit was spent counseling and/or coordinating care by the attending physician.     ASSESSMENT AND PLAN
Patient is a 89y old  Female who presents with a chief complaint of weakness (22 Oct 2022 06:11)    Date of servie : 10-22-22 @ 15:03  INTERVAL HPI/OVERNIGHT EVENTS:  T(C): 36.9 (10-22-22 @ 13:50), Max: 36.9 (10-21-22 @ 21:18)  HR: 85 (10-22-22 @ 13:50) (85 - 98)  BP: 123/63 (10-22-22 @ 13:50) (123/63 - 170/80)  RR: 18 (10-22-22 @ 13:50) (16 - 18)  SpO2: 100% (10-22-22 @ 13:50) (95% - 100%)  Wt(kg): --  I&O's Summary      LABS:                        13.7   5.45  )-----------( 177      ( 22 Oct 2022 10:23 )             41.9     10    136  |  102  |  10  ----------------------------<  101<H>  3.5   |  25  |  0.63    Ca    8.1<L>      22 Oct 2022 10:23    TPro  6.3  /  Alb  3.0<L>  /  TBili  0.4  /  DBili  0.1  /  AST  31  /  ALT  26  /  AlkPhos  64  10-22    PT/INR - ( 22 Oct 2022 10:23 )   PT: 13.0 sec;   INR: 1.11 ratio         PTT - ( 21 Oct 2022 22:45 )  PTT:34.0 sec  Urinalysis Basic - ( 21 Oct 2022 15:30 )    Color: Yellow / Appearance: Clear / S.020 / pH: x  Gluc: x / Ketone: Negative  / Bili: Negative / Urobili: Negative   Blood: x / Protein: 30 mg/dL / Nitrite: Negative   Leuk Esterase: Negative / RBC: 0-2 /HPF / WBC 0-2   Sq Epi: x / Non Sq Epi: Occasional / Bacteria: Occasional      CAPILLARY BLOOD GLUCOSE            Urinalysis Basic - ( 21 Oct 2022 15:30 )    Color: Yellow / Appearance: Clear / S.020 / pH: x  Gluc: x / Ketone: Negative  / Bili: Negative / Urobili: Negative   Blood: x / Protein: 30 mg/dL / Nitrite: Negative   Leuk Esterase: Negative / RBC: 0-2 /HPF / WBC 0-2   Sq Epi: x / Non Sq Epi: Occasional / Bacteria: Occasional        MEDICATIONS  (STANDING):  atorvastatin 20 milliGRAM(s) Oral at bedtime  diltiazem    milliGRAM(s) Oral daily  diltiazem    milliGRAM(s) Oral daily  enoxaparin Injectable 40 milliGRAM(s) SubCutaneous every 24 hours  memantine 10 milliGRAM(s) Oral daily  metoprolol succinate ER 25 milliGRAM(s) Oral daily  PARoxetine 20 milliGRAM(s) Oral daily  remdesivir  IVPB   IV Intermittent     MEDICATIONS  (PRN):  guaiFENesin Oral Liquid (Sugar-Free) 200 milliGRAM(s) Oral every 6 hours PRN Cough  melatonin 3 milliGRAM(s) Oral at bedtime PRN Insomnia  oxyCODONE    IR 5 milliGRAM(s) Oral every 4 hours PRN Severe Pain (7 - 10)          PHYSICAL EXAM:  GENERAL: NAD, well-groomed, well-developed  HEAD:  Atraumatic, Normocephalic  CHEST/LUNG: Clear to percussion bilaterally; No rales, rhonchi, wheezing, or rubs  HEART: Regular rate and rhythm; No murmurs, rubs, or gallops  ABDOMEN: Soft, Nontender, Nondistended; Bowel sounds present  EXTREMITIES:  2+ Peripheral Pulses, No clubbing, cyanosis, or edema  LYMPH: No lymphadenopathy noted  SKIN: No rashes or lesions    Care Discussed with Consultants/Other Providers [x ] YES  [ ] NO
Patient is a 89y old  Female who presents with a chief complaint of weakness (23 Oct 2022 06:30)    Date of servie : 10-23-22 @ 16:17  INTERVAL HPI/OVERNIGHT EVENTS:  T(C): 36.4 (10-23-22 @ 13:36), Max: 37.2 (10-22-22 @ 17:00)  HR: 98 (10-23-22 @ 13:36) (81 - 98)  BP: 118/87 (10-23-22 @ 13:36) (117/74 - 132/88)  RR: 15 (10-23-22 @ 13:36) (15 - 18)  SpO2: 98% (10-23-22 @ 13:36) (98% - 99%)  Wt(kg): --  I&O's Summary    22 Oct 2022 07:01  -  23 Oct 2022 07:00  --------------------------------------------------------  IN: 250 mL / OUT: 0 mL / NET: 250 mL    23 Oct 2022 07:01  -  23 Oct 2022 16:17  --------------------------------------------------------  IN: 240 mL / OUT: 0 mL / NET: 240 mL        LABS:                        13.7   5.45  )-----------( 177      ( 22 Oct 2022 10:23 )             41.9     10-23    x   |  x   |  x   ----------------------------<  x   x    |  x   |  0.62    Ca    8.1<L>      22 Oct 2022 10:23    TPro  6.4  /  Alb  2.9<L>  /  TBili  0.4  /  DBili  0.1  /  AST  70<H>  /  ALT  47  /  AlkPhos  65  10-23    PT/INR - ( 23 Oct 2022 08:32 )   PT: 12.6 sec;   INR: 1.08 ratio         PTT - ( 21 Oct 2022 22:45 )  PTT:34.0 sec    CAPILLARY BLOOD GLUCOSE                MEDICATIONS  (STANDING):  atorvastatin 20 milliGRAM(s) Oral at bedtime  diltiazem    milliGRAM(s) Oral daily  diltiazem    milliGRAM(s) Oral daily  enoxaparin Injectable 40 milliGRAM(s) SubCutaneous every 24 hours  memantine 10 milliGRAM(s) Oral daily  metoprolol succinate ER 25 milliGRAM(s) Oral daily  metoprolol tartrate Injectable 5 milliGRAM(s) IV Push once  PARoxetine 20 milliGRAM(s) Oral daily  remdesivir  IVPB 100 milliGRAM(s) IV Intermittent every 24 hours  remdesivir  IVPB   IV Intermittent   sodium chloride 0.9% with potassium chloride 20 mEq/L 1000 milliLiter(s) (75 mL/Hr) IV Continuous <Continuous>    MEDICATIONS  (PRN):  guaiFENesin Oral Liquid (Sugar-Free) 200 milliGRAM(s) Oral every 6 hours PRN Cough  melatonin 3 milliGRAM(s) Oral at bedtime PRN Insomnia  oxyCODONE    IR 5 milliGRAM(s) Oral every 4 hours PRN Severe Pain (7 - 10)          PHYSICAL EXAM:  GENERAL: NAD, well-groomed, well-developed  HEAD:  Atraumatic, Normocephalic  CHEST/LUNG: Clear to percussion bilaterally; No rales, rhonchi, wheezing, or rubs  HEART: Regular rate and rhythm; No murmurs, rubs, or gallops  ABDOMEN: Soft, Nontender, Nondistended; Bowel sounds present  EXTREMITIES:  2+ Peripheral Pulses, No clubbing, cyanosis, or edema  LYMPH: No lymphadenopathy noted  SKIN: No rashes or lesions    Care Discussed with Consultants/Other Providers [ ] YES  [ ] NO
Patient is a 89y old  Female who presents with a chief complaint of weakness (24 Oct 2022 06:57)    Date of servie : 10-24-22 @ 16:32  INTERVAL HPI/OVERNIGHT EVENTS:  T(C): 36.7 (10-24-22 @ 11:52), Max: 36.8 (10-23-22 @ 20:17)  HR: 68 (10-24-22 @ 11:52) (68 - 79)  BP: 126/76 (10-24-22 @ 11:52) (120/79 - 156/89)  RR: 15 (10-24-22 @ 11:52) (15 - 17)  SpO2: 97% (10-24-22 @ 11:52) (97% - 98%)  Wt(kg): --  I&O's Summary    23 Oct 2022 07:01  -  24 Oct 2022 07:00  --------------------------------------------------------  IN: 240 mL / OUT: 0 mL / NET: 240 mL        LABS:    10-24    138  |  102  |  23  ----------------------------<  82  3.5   |  28  |  0.80    Ca    7.9<L>      24 Oct 2022 06:10    TPro  6.4  /  Alb  2.9<L>  /  TBili  0.5  /  DBili  0.2  /  AST  72<H>  /  ALT  64  /  AlkPhos  64  10-24    PT/INR - ( 24 Oct 2022 06:10 )   PT: 13.3 sec;   INR: 1.14 ratio             CAPILLARY BLOOD GLUCOSE                MEDICATIONS  (STANDING):  atorvastatin 20 milliGRAM(s) Oral at bedtime  diltiazem    milliGRAM(s) Oral daily  diltiazem    milliGRAM(s) Oral daily  enoxaparin Injectable 40 milliGRAM(s) SubCutaneous every 24 hours  memantine 10 milliGRAM(s) Oral daily  metoprolol succinate ER 25 milliGRAM(s) Oral daily  metoprolol tartrate Injectable 5 milliGRAM(s) IV Push once  PARoxetine 20 milliGRAM(s) Oral daily  remdesivir  IVPB 100 milliGRAM(s) IV Intermittent every 24 hours  remdesivir  IVPB   IV Intermittent     MEDICATIONS  (PRN):  guaiFENesin Oral Liquid (Sugar-Free) 200 milliGRAM(s) Oral every 6 hours PRN Cough  melatonin 3 milliGRAM(s) Oral at bedtime PRN Insomnia  oxyCODONE    IR 5 milliGRAM(s) Oral every 4 hours PRN Severe Pain (7 - 10)          PHYSICAL EXAM:  GENERAL: frail  CHEST/LUNG: Clear to percussion bilaterally; No rales, rhonchi, wheezing, or rubs  HEART: Regular rate and rhythm; No murmurs, rubs, or gallops  ABDOMEN: Soft, Nontender, Nondistended; Bowel sounds present  EXTREMITIES:  edema +    Care Discussed with Consultants/Other Providers [ ] YES  [ ] NO
     SADIA CRUZ is a 89yFemale , patient examined and chart reviewed.    INTERVAL HPI/ OVERNIGHT EVENTS:   NAD. Confused.  Afebrile. No events  Doing well.    PAST MEDICAL & SURGICAL HISTORY:  Osteopenia  High cholesterol  Atrial fibrillation      For details regarding the patient's social history, family history, and other miscellaneous elements, please refer the initial infectious diseases consultation and/or the admitting history and physical examination for this admission.    ROS:  Unable to obtain due to : Dementia      Current inpatient medications :    ANTIBIOTICS/RELEVANT:    MEDICATIONS  (STANDING):  atorvastatin 20 milliGRAM(s) Oral at bedtime  diltiazem    milliGRAM(s) Oral daily  diltiazem    milliGRAM(s) Oral daily  enoxaparin Injectable 40 milliGRAM(s) SubCutaneous every 24 hours  memantine 10 milliGRAM(s) Oral daily  metoprolol succinate ER 25 milliGRAM(s) Oral daily  metoprolol tartrate Injectable 5 milliGRAM(s) IV Push once  PARoxetine 20 milliGRAM(s) Oral daily    MEDICATIONS  (PRN):  guaiFENesin Oral Liquid (Sugar-Free) 200 milliGRAM(s) Oral every 6 hours PRN Cough  melatonin 3 milliGRAM(s) Oral at bedtime PRN Insomnia  oxyCODONE    IR 5 milliGRAM(s) Oral every 4 hours PRN Severe Pain (7 - 10)        Objective:  Vital Signs Last 24 Hrs  T(C): 36.8 (25 Oct 2022 12:45), Max: 36.8 (25 Oct 2022 05:15)  T(F): 98.2 (25 Oct 2022 12:45), Max: 98.2 (25 Oct 2022 05:15)  HR: 63 (25 Oct 2022 12:45) (63 - 73)  BP: 107/72 (25 Oct 2022 12:45) (107/72 - 147/93)  RR: 17 (25 Oct 2022 12:45) (17 - 18)  SpO2: 96% (25 Oct 2022 12:45) (94% - 96%)    Parameters below as of 25 Oct 2022 12:45  Patient On (Oxygen Delivery Method): room air        Physical Exam:  General: no acute distress  Neck: supple, trachea midline  Lungs: clear, no wheeze/rhonchi  Cardiovascular: regular rate and rhythm, S1 S2  Abdomen: soft, nontender,  bowel sounds normal  Neurological: awake  Skin: no rash  Extremities: no edema      LABS:        MICROBIOLOGY:  Culture - Urine (collected 21 Oct 2022 15:30)  Source: Clean Catch Clean Catch (Midstream)  Final Report (22 Oct 2022 22:02):    >=3 organisms. Probable collection contamination    RADIOLOGY & ADDITIONAL STUDIES:    Assessment :  89YO F PMH Dementia, Afib on coumadin, pacemaker, osteopenia, right hip fracture sp right THR 9/11/21 admitted with weakness and cough sec COVID 19 infection   Afebrile. On RA. Pt is vaccinated and boostered.  Clinically stable    Plan :   Supportive care  Sp Remdesivir x 3 days ended 10/24  Trend temps and cbc  Asp precautions  Stable from ID standpoint  Dc home today    Continue with present regiment.  Appropriate use of antibiotics and adverse effects reviewed.    > 35 minutes were spent in direct patient care reviewing notes, medications ,labs data/ imaging , discussion with multidisciplinary team.    Thank you for allowing me to participate in care of your patient .    Mushtaq Griggs MD  Infectious Disease  [phone]
Date/Time Patient Seen:  		  Referring MD:   Data Reviewed	       Patient is a 89y old  Female who presents with a chief complaint of weakness (24 Oct 2022 17:43)      Subjective/HPI     PAST MEDICAL & SURGICAL HISTORY:  Osteopenia    High cholesterol    Atrial fibrillation    No significant past surgical history          Medication list         MEDICATIONS  (STANDING):  atorvastatin 20 milliGRAM(s) Oral at bedtime  diltiazem    milliGRAM(s) Oral daily  diltiazem    milliGRAM(s) Oral daily  enoxaparin Injectable 40 milliGRAM(s) SubCutaneous every 24 hours  memantine 10 milliGRAM(s) Oral daily  metoprolol succinate ER 25 milliGRAM(s) Oral daily  metoprolol tartrate Injectable 5 milliGRAM(s) IV Push once  PARoxetine 20 milliGRAM(s) Oral daily    MEDICATIONS  (PRN):  guaiFENesin Oral Liquid (Sugar-Free) 200 milliGRAM(s) Oral every 6 hours PRN Cough  melatonin 3 milliGRAM(s) Oral at bedtime PRN Insomnia  oxyCODONE    IR 5 milliGRAM(s) Oral every 4 hours PRN Severe Pain (7 - 10)         Vitals log        ICU Vital Signs Last 24 Hrs  T(C): 36.8 (25 Oct 2022 05:15), Max: 36.8 (25 Oct 2022 05:15)  T(F): 98.2 (25 Oct 2022 05:15), Max: 98.2 (25 Oct 2022 05:15)  HR: 73 (25 Oct 2022 05:15) (68 - 93)  BP: 147/93 (25 Oct 2022 05:15) (122/82 - 147/93)  BP(mean): --  ABP: --  ABP(mean): --  RR: 18 (25 Oct 2022 05:15) (15 - 18)  SpO2: 94% (25 Oct 2022 05:15) (94% - 97%)    O2 Parameters below as of 25 Oct 2022 05:15  Patient On (Oxygen Delivery Method): room air                 Input and Output:  I&O's Detail    23 Oct 2022 07:01  -  24 Oct 2022 07:00  --------------------------------------------------------  IN:    Oral Fluid: 240 mL  Total IN: 240 mL    OUT:  Total OUT: 0 mL    Total NET: 240 mL          Lab Data    10-24    138  |  102  |  23  ----------------------------<  82  3.5   |  28  |  0.80    Ca    7.9<L>      24 Oct 2022 06:10    TPro  6.4  /  Alb  2.9<L>  /  TBili  0.5  /  DBili  0.2  /  AST  72<H>  /  ALT  64  /  AlkPhos  64  10-24            Review of Systems	      Objective     Physical Examination  heart s1s2  lung dec BS  head nc        Pertinent Lab findings & Imaging      Cynthia:  NO   Adequate UO     I&O's Detail    23 Oct 2022 07:01  -  24 Oct 2022 07:00  --------------------------------------------------------  IN:    Oral Fluid: 240 mL  Total IN: 240 mL    OUT:  Total OUT: 0 mL    Total NET: 240 mL               Discussed with:     Cultures:	        Radiology                            
[facility] Cardiology Progress Note [phone] (Dr. Cortez, Ray, Dania, Miranda)    CHIEF COMPLAINT: Patient is a 89y old  Female who presents with a chief complaint of weakness (24 Oct 2022 06:39)      Follow Up Today: The patient denies any chest discomfort or shortness of breath.    HPI:   89-year-old female past medical history of of A. genna not on AC hyperlipidemia dementia here for generalized weakness cough for last 2 days.  Patient tested positive for COVID today.  Patient lives with daughter unable to get out of bed dressed to eat usually walks with a walker unable to walk today.  Daughter states patient has been more lethargic.  No trauma or falls no chest pain or shortness of breath. (21 Oct 2022 20:40)      PAST MEDICAL & SURGICAL HISTORY:  Osteopenia      High cholesterol      Atrial fibrillation          MEDICATIONS  (STANDING):  atorvastatin 20 milliGRAM(s) Oral at bedtime  diltiazem    milliGRAM(s) Oral daily  diltiazem    milliGRAM(s) Oral daily  enoxaparin Injectable 40 milliGRAM(s) SubCutaneous every 24 hours  memantine 10 milliGRAM(s) Oral daily  metoprolol succinate ER 25 milliGRAM(s) Oral daily  metoprolol tartrate Injectable 5 milliGRAM(s) IV Push once  PARoxetine 20 milliGRAM(s) Oral daily  remdesivir  IVPB 100 milliGRAM(s) IV Intermittent every 24 hours  remdesivir  IVPB   IV Intermittent     MEDICATIONS  (PRN):  guaiFENesin Oral Liquid (Sugar-Free) 200 milliGRAM(s) Oral every 6 hours PRN Cough  melatonin 3 milliGRAM(s) Oral at bedtime PRN Insomnia  oxyCODONE    IR 5 milliGRAM(s) Oral every 4 hours PRN Severe Pain (7 - 10)      Allergies    No Known Allergies    Intolerances        REVIEW OF SYSTEMS:    All other review of systems is negative unless indicated above    Vital Signs Last 24 Hrs  T(C): 36.8 (24 Oct 2022 05:04), Max: 36.8 (23 Oct 2022 20:17)  T(F): 98.2 (24 Oct 2022 05:04), Max: 98.2 (23 Oct 2022 20:17)  HR: 78 (24 Oct 2022 05:04) (78 - 98)  BP: 156/89 (24 Oct 2022 05:04) (118/87 - 156/89)  BP(mean): --  RR: 17 (24 Oct 2022 05:04) (15 - 17)  SpO2: 98% (24 Oct 2022 05:04) (97% - 98%)    Parameters below as of 24 Oct 2022 05:04  Patient On (Oxygen Delivery Method): room air        I&O's Summary    22 Oct 2022 07:01  -  23 Oct 2022 07:00  --------------------------------------------------------  IN: 250 mL / OUT: 0 mL / NET: 250 mL    23 Oct 2022 07:01  -  24 Oct 2022 06:57  --------------------------------------------------------  IN: 240 mL / OUT: 0 mL / NET: 240 mL        PHYSICAL EXAM:    Constitutional: NAD, awake and alert, well-developed  Eyes:  EOMI,  Pupils round, No oral cyanosis.  HEENT: No exudate or erythema  Pulmonary: Non-labored, breath sounds are clear bilaterally, No wheezing, rales or rhonchi  Cardiovascular: Regular, S1 and S2, No murmurs, rubs, gallops oir clicks  Gastrointestinal: Bowel Sounds present, soft, nontender.   Ext: No significant LE edema  Neurological: Alert, no gross focal motor deficits  Skin: No rashes.  Psych:  Mood & affect appropriate    LABS: All Labs Reviewed:                        13.7   5.45  )-----------( 177      ( 22 Oct 2022 10:23 )             41.9                         13.4   4.05  )-----------( 207      ( 21 Oct 2022 13:35 )             40.1     23 Oct 2022 18:30    136    |  103    |  25     ----------------------------<  96     3.8     |  26     |  0.86   23 Oct 2022 08:32    x      |  x      |  x      ----------------------------<  x      x       |  x      |  0.62   22 Oct 2022 10:23    136    |  102    |  10     ----------------------------<  101    3.5     |  25     |  0.63     Ca    8.4        23 Oct 2022 18:30  Ca    8.1        22 Oct 2022 10:23  Ca    8.6        21 Oct 2022 13:35    TPro  6.4    /  Alb  2.9    /  TBili  0.4    /  DBili  0.1    /  AST  70     /  ALT  47     /  AlkPhos  65     23 Oct 2022 08:32  TPro  6.3    /  Alb  3.0    /  TBili  0.4    /  DBili  0.1    /  AST  31     /  ALT  26     /  AlkPhos  64     22 Oct 2022 10:23  TPro  6.1    /  Alb  2.8    /  TBili  0.5    /  DBili  0.1    /  AST  22     /  ALT  24     /  AlkPhos  64     21 Oct 2022 22:45    PT/INR - ( 23 Oct 2022 08:32 )   PT: 12.6 sec;   INR: 1.08 ratio               Blood Culture: Organism --  Gram Stain Blood -- Gram Stain --  Specimen Source Clean Catch Clean Catch (Midstream)  Culture-Blood --            RADIOLOGY/EKG:    Attending Attestation:   20 minutes spent on total encounter; more than 50% of the visit was spent counseling and/or coordinating care by the attending physician.     ASSESSMENT AND PLAN

## 2022-10-25 NOTE — DISCHARGE NOTE NURSING/CASE MANAGEMENT/SOCIAL WORK - PATIENT PORTAL LINK FT
Prolia- prior authorization]] prior bisphosphonate use- 5 years, GE reflux disease multiple fractures-despite bisphosphonate use   You can access the FollowMyHealth Patient Portal offered by [facility] by registering at the following website: [email or web address]. By joining [employer]’s FollowMyHealth portal, you will also be able to view your health information using other applications (apps) compatible with our system.

## 2022-10-25 NOTE — DISCHARGE NOTE PROVIDER - HOSPITAL COURSE
89-year-old female past medical history of of A.genna not on AC hyperlipidemia dementia here for generalized weakness cough for last 2 days.  Patient tested positive for COVID today.  Patient lives with daughter unable to get out of bed dressed to eat usually walks with a walker unable to walk today.  Daughter states patient has been more lethargic.  No trauma or falls no chest pain or shortness of breath.    1 covid 19  - dc remdesivirRA   - supportive care    2 Afib  - not on AC now sec to fall risk  - cw diltiazem  - cw toprol  - cards to follow    3 HLD  - cw statin    4 Dysphagia  - easy to chew diet    SEVERE PROTEIN CALORIE MALNUTRITION

## 2022-10-25 NOTE — DIETITIAN INITIAL EVALUATION ADULT - PERTINENT LABORATORY DATA
10-24    138  |  102  |  23  ----------------------------<  82  3.5   |  28  |  0.80    Ca    7.9<L>      24 Oct 2022 06:10    TPro  6.4  /  Alb  2.9<L>  /  TBili  0.5  /  DBili  0.2  /  AST  72<H>  /  ALT  64  /  AlkPhos  64  10-24

## 2022-10-25 NOTE — PROGRESS NOTE ADULT - PROVIDER SPECIALTY LIST ADULT
Cardiology
Infectious Disease
Infectious Disease
Pulmonology
Pulmonology
Cardiology
Cardiology
Hospitalist
Infectious Disease
Hospitalist
Hospitalist
Pulmonology

## 2022-10-25 NOTE — SWALLOW BEDSIDE ASSESSMENT ADULT - SWALLOW EVAL: DIAGNOSIS
Pt p/w a mild oral dysphagia when given regular solids marked by adequate retrieval and containment, mildly prolonged mastication likely 2/2 incomplete dentition with delayed manipulation, timely transfer, and adequate clearance post swallow. Functional oral phase when given puree and thin liquids marked by adequate retrieval and containment, timely manipulation and transfer, and adequate clearance post swallow. Pharyngeal phase marked by suspected timely swallow onset, +laryngeal elevation to palpation, and no overt s/sx of aspiration. Recommend easy to chew with thin liquids +aspiration precautions. Discussed with RN, called out to MD.

## 2022-10-25 NOTE — DIETITIAN INITIAL EVALUATION ADULT - OTHER INFO
89-year-old female past medical history of of A.genna not on AC hyperlipidemia dementia here for generalized weakness cough for last 2 days.  Patient tested positive for COVID today.  Patient lives with daughter unable to get out of bed dressed to eat usually walks with a walker unable to walk today.  Daughter states patient has been more lethargic.  No trauma or falls no chest pain or shortness of breath.    1 covid 19  - cw remdesivir  - dc  decadron, doing well on RA   - supportive care    2 Afib  - not on AC now sec to fall risk  - cw diltiazem  - cw toprol  - cards to follow    3 HLD  - cw statin    4 Dysphagia  - check speech and swallow    89-year-old female past medical history of of A.genna not on AC hyperlipidemia dementia here for generalized weakness cough for last 2 days.  Patient tested positive for COVID day of admission   Patient lives with daughter . Swallow eval is pending  Pt somewhat confused at time of RD visit. Pt tells me she lives in a private home but with her " mother" She tells me she is 5'5" tall and UBW is 140#. Pt observed at [facility], ate very little <25% of Frittata. Pt denies any problems swallowing; would not surprised if SLP found otherwise

## 2022-10-25 NOTE — DIETITIAN INITIAL EVALUATION ADULT - ORAL INTAKE PTA/DIET HISTORY
no restrictions  markedly decreased intake for the last few days pta associated with generalized weakness and cough

## 2022-10-25 NOTE — PROGRESS NOTE ADULT - ASSESSMENT
The patient is an 89 year old female with a history of HL, atrial fibrillation, dementia who presents with weakness/lethargy in the setting of COVID. She appears confused but in good spirits with good appetite.    Plan:  - ECG with known atrial fibrillation  - Continue diltiazem  mg qam and 120 mg qpm (home dose)  - Continue metoprolol succinate 25 mg daily  - Not on anticoagulation, presumably due to dementia and prior falls  - COVID positive  - Not hypoxic  - On remdesivir and dexamethasone  - DVT prophylaxis

## 2022-10-25 NOTE — DIETITIAN INITIAL EVALUATION ADULT - NSFNSGIIOFT_GEN_A_CORE
No GI issues at this time  documented wt 68.5 kg/150.7 ( 10/24/22) 68.2 kg /150.04( 10/23/22)  will use #/63.63 kg for assessment purposes at the above does not appear accurate
no

## 2022-10-25 NOTE — DISCHARGE NOTE NURSING/CASE MANAGEMENT/SOCIAL WORK - NSDCVIVACCINE_GEN_ALL_CORE_FT
influenza, injectable, quadrivalent, preservative free; 15-Sep-2021 17:24; Tamar Sanchez (RN); [employer]; [id number] (Exp. Date: 30-Jul-2022); IntraMuscular; Deltoid Left.; 0.5 milliLiter(s); VIS (VIS Published: 15-Aug-2019, VIS Presented: 15-Sep-2021);

## 2022-10-25 NOTE — DIETITIAN INITIAL EVALUATION ADULT - PERTINENT MEDS FT
MEDICATIONS  (STANDING):  atorvastatin 20 milliGRAM(s) Oral at bedtime  diltiazem    milliGRAM(s) Oral daily  diltiazem    milliGRAM(s) Oral daily  enoxaparin Injectable 40 milliGRAM(s) SubCutaneous every 24 hours  memantine 10 milliGRAM(s) Oral daily  metoprolol succinate ER 25 milliGRAM(s) Oral daily  metoprolol tartrate Injectable 5 milliGRAM(s) IV Push once  PARoxetine 20 milliGRAM(s) Oral daily    MEDICATIONS  (PRN):  guaiFENesin Oral Liquid (Sugar-Free) 200 milliGRAM(s) Oral every 6 hours PRN Cough  melatonin 3 milliGRAM(s) Oral at bedtime PRN Insomnia  oxyCODONE    IR 5 milliGRAM(s) Oral every 4 hours PRN Severe Pain (7 - 10)

## 2022-10-25 NOTE — SWALLOW BEDSIDE ASSESSMENT ADULT - COMMENTS
Pt received upright in bed, awake and cooperative, on room air. Pt was agreeable to assessment. Pt followed low level directives with verbal prompts. Pt's voice was mildly hoarse, speech production was WFL. Pt denied pain pre and post assessment.  Chart event note 10/23, "Called by RN for dysphagia and inability to take morning meds". Discussed with RN this AM, who reported pt is consuming crushed meds in applesauce and tolerating w/o difficulty. RN reported pt with overall reduced PO intake, but no overt s/sx of aspiration noted.    CXR 10/21: "No radiographic evidence of active chest disease"  Pt's WBC is WFL, no fever.

## 2022-10-25 NOTE — DISCHARGE NOTE PROVIDER - NSDCMRMEDTOKEN_GEN_ALL_CORE_FT
DilTIAZem (Eqv-Cardizem CD) 120 mg/24 hours oral capsule, extended release: 2 capsule(s) orally once daily in the morning and then 1 capsule orally once in the evening.  folic acid 1 mg oral tablet: 1 tab(s) orally once a day  melatonin 3 mg oral tablet: 1 tab(s) orally once a day (at bedtime), As needed, Insomnia  memantine 10 mg oral tablet: 1 tab(s) orally once a day  multivitamin: 1 tab(s) orally once a day  PARoxetine 20 mg oral tablet: 1 tab(s) orally once a day  pravastatin 20 mg oral tablet: 1 tab(s) orally once a day  Toprol-XL 25 mg oral tablet, extended release: 1 tab(s) orally once a day

## 2022-10-25 NOTE — PROGRESS NOTE ADULT - ASSESSMENT
89-year-old female past medical history of of A. fib not on AC hyperlipidemia dementia here for generalized weakness cough    covid  weakness  OP  OA  AF  Cough  HLD  Dementia    s/p remdesivir  vs noted  labs reviewed  CM follow up noted    s/p remdesivir for covid  cxr noted  labs reviewed  dvt p  assist with needs  isolation precs  oral hygiene  acap prn  robitussin prn  ID eval  cvs rx regimen and BP control - hx of AF - not on AC

## 2022-10-25 NOTE — SWALLOW BEDSIDE ASSESSMENT ADULT - ASR SWALLOW REFERRAL
ongoing dietary f/u to ensure pt is meeting adequate daily caloric needs given report of reduced PO intake

## 2022-10-25 NOTE — SWALLOW BEDSIDE ASSESSMENT ADULT - ASR SWALLOW RECOMMEND DIAG
not warranted at this time, as pt appeared to clinically tolerate PO and CXR absent of active chest disease; will f/u x1 at bedside to ensure diet tolerance

## 2022-11-08 PROCEDURE — 85027 COMPLETE CBC AUTOMATED: CPT

## 2022-11-08 PROCEDURE — 93005 ELECTROCARDIOGRAM TRACING: CPT

## 2022-11-08 PROCEDURE — 36415 COLL VENOUS BLD VENIPUNCTURE: CPT

## 2022-11-08 PROCEDURE — 99285 EMERGENCY DEPT VISIT HI MDM: CPT | Mod: 25

## 2022-11-08 PROCEDURE — 80048 BASIC METABOLIC PNL TOTAL CA: CPT

## 2022-11-08 PROCEDURE — 80076 HEPATIC FUNCTION PANEL: CPT

## 2022-11-08 PROCEDURE — 85610 PROTHROMBIN TIME: CPT

## 2022-11-08 PROCEDURE — 87086 URINE CULTURE/COLONY COUNT: CPT

## 2022-11-08 PROCEDURE — 97162 PT EVAL MOD COMPLEX 30 MIN: CPT

## 2022-11-08 PROCEDURE — 82565 ASSAY OF CREATININE: CPT

## 2022-11-08 PROCEDURE — 85025 COMPLETE CBC W/AUTO DIFF WBC: CPT

## 2022-11-08 PROCEDURE — 80061 LIPID PANEL: CPT

## 2022-11-08 PROCEDURE — 81001 URINALYSIS AUTO W/SCOPE: CPT

## 2022-11-08 PROCEDURE — 80053 COMPREHEN METABOLIC PANEL: CPT

## 2022-11-08 PROCEDURE — 97110 THERAPEUTIC EXERCISES: CPT

## 2022-11-08 PROCEDURE — 71045 X-RAY EXAM CHEST 1 VIEW: CPT

## 2022-11-08 PROCEDURE — 92610 EVALUATE SWALLOWING FUNCTION: CPT

## 2022-11-08 PROCEDURE — 96360 HYDRATION IV INFUSION INIT: CPT

## 2022-11-08 PROCEDURE — U0005: CPT

## 2022-11-08 PROCEDURE — 85730 THROMBOPLASTIN TIME PARTIAL: CPT

## 2022-11-08 PROCEDURE — U0003: CPT

## 2024-02-22 NOTE — BRIEF OPERATIVE NOTE - NSICDXBRIEFPREOP_GEN_ALL_CORE_FT
1) lab 4 weeks (I think orders already sent - see phone note) 2) video visit with me in 3 months, labs prior PRE-OP DIAGNOSIS:  Fracture of femoral neck, right 11-Sep-2021 13:10:25  Josef Garcia

## 2025-03-04 NOTE — PRE-OP CHECKLIST - HAND OFF
Patient is here for follow-up after recent tonsillectomy and adenoidectomy.  He had no bleeding postoperatively.  He is breathing much better at night and during the day.  He still reports a funny sensation in his throat which has had him not eating as well as he did before surgery.    Exam does show the tonsil area nicely healed I do not see any abnormal scarring no tethering of the palate it moves nicely with phonation.    Postoperative visit from tonsillectomy and adenoidectomy.  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  I feel that this will work itself out in the next month.  He should resume completely normal eating habits.  He continues to have problems they will call to be reevaluated   Drysol Pregnancy And Lactation Text: This medication is considered safe during pregnancy and breast feeding. Doxycycline Pregnancy And Lactation Text: This medication is Pregnancy Category D and not consider safe during pregnancy. It is also excreted in breast milk but is considered safe for shorter treatment courses. Simponi Pregnancy And Lactation Text: The risk during pregnancy and breastfeeding is uncertain with this medication. Methotrexate Counseling:  Patient counseled regarding adverse effects of methotrexate including but not limited to nausea, vomiting, abnormalities in liver function tests. Patients may develop mouth sores, rash, diarrhea, and abnormalities in blood counts. The patient understands that monitoring is required including LFT's and blood counts.  There is a rare possibility of scarring of the liver and lung problems that can occur when taking methotrexate. Persistent nausea, loss of appetite, pale stools, dark urine, cough, and shortness of breath should be reported immediately. Patient advised to discontinue methotrexate treatment at least three months before attempting to become pregnant.  I discussed the need for folate supplements while taking methotrexate.  These supplements can decrease side effects during methotrexate treatment. The patient verbalized understanding of the proper use and possible adverse effects of methotrexate.  All of the patient's questions and concerns were addressed. Griseofulvin Pregnancy And Lactation Text: This medication is Pregnancy Category X and is known to cause serious birth defects. It is unknown if this medication is excreted in breast milk but breast feeding should be avoided. Tranexamic Acid Counseling:  Patient advised of the small risk of bleeding problems with tranexamic acid. They were also instructed to call if they developed any nausea, vomiting or diarrhea. All of the patient's questions and concerns were addressed. Dupixent Pregnancy And Lactation Text: This medication likely crosses the placenta but the risk for the fetus is uncertain. This medication is excreted in breast milk. Azithromycin Counseling:  I discussed with the patient the risks of azithromycin including but not limited to GI upset, allergic reaction, drug rash, diarrhea, and yeast infections. Otezla Pregnancy And Lactation Text: This medication is Pregnancy Category C and it isn't known if it is safe during pregnancy. It is unknown if it is excreted in breast milk. Benzoyl Peroxide Counseling: Patient counseled that medicine may cause skin irritation and bleach clothing.  In the event of skin irritation, the patient was advised to reduce the amount of the drug applied or use it less frequently.   The patient verbalized understanding of the proper use and possible adverse effects of benzoyl peroxide.  All of the patient's questions and concerns were addressed. Adbry Counseling: I discussed with the patient the risks of tralokinumab including but not limited to eye infection and irritation, cold sores, injection site reactions, worsening of asthma, allergic reactions and increased risk of parasitic infection.  Live vaccines should be avoided while taking tralokinumab. The patient understands that monitoring is required and they must alert us or the primary physician if symptoms of infection or other concerning signs are noted. Topical Metronidazole Counseling: Metronidazole is a topical antibiotic medication. You may experience burning, stinging, redness, or allergic reactions.  Please call our office if you develop any problems from using this medication. Olumiant Pregnancy And Lactation Text: Based on animal studies, Olumiant may cause embryo-fetal harm when administered to pregnant women.  The medication should not be used in pregnancy.  Breastfeeding is not recommended during treatment. Isotretinoin Counseling: Patient should get monthly blood tests, not donate blood, not drive at night if vision affected, not share medication, and not undergo elective surgery for 6 months after tx completed. Side effects reviewed, pt to contact office should one occur. Soolantra Pregnancy And Lactation Text: This medication is Pregnancy Category C. This medication is considered safe during breast feeding. Doxepin Counseling:  Patient advised that the medication is sedating and not to drive a car after taking this medication. Patient informed of potential adverse effects including but not limited to dry mouth, urinary retention, and blurry vision.  The patient verbalized understanding of the proper use and possible adverse effects of doxepin.  All of the patient's questions and concerns were addressed. yes Opioid Counseling: I discussed with the patient the potential side effects of opioids including but not limited to addiction, altered mental status, and depression. I stressed avoiding alcohol, benzodiazepines, muscle relaxants and sleep aids unless specifically okayed by a physician. The patient verbalized understanding of the proper use and possible adverse effects of opioids. All of the patient's questions and concerns were addressed. They were instructed to flush the remaining pills down the toilet if they did not need them for pain. Protopic Counseling: Patient may experience a mild burning sensation during topical application. Protopic is not approved in children less than 2 years of age. There have been case reports of hematologic and skin malignancies in patients using topical calcineurin inhibitors although causality is questionable. Dutasteride Female Counseling: Dutasteride Counseling:  I discussed with the patient the risks of use of dutasteride including but not limited to decreased libido and sexual dysfunction. Explained the teratogenic nature of the medication and stressed the importance of not getting pregnant during treatment. All of the patient's questions and concerns were addressed. Niacinamide Counseling: I recommended taking niacin or niacinamide, also know as vitamin B3, twice daily. Recent evidence suggests that taking vitamin B3 (500 mg twice daily) can reduce the risk of actinic keratoses and non-melanoma skin cancers. Side effects of vitamin B3 include flushing and headache. Xolair Pregnancy And Lactation Text: This medication is Pregnancy Category B and is considered safe during pregnancy. This medication is excreted in breast milk. Klisyri Pregnancy And Lactation Text: It is unknown if this medication can harm a developing fetus or if it is excreted in breast milk. Bexarotene Counseling:  I discussed with the patient the risks of bexarotene including but not limited to hair loss, dry lips/skin/eyes, liver abnormalities, hyperlipidemia, pancreatitis, depression/suicidal ideation, photosensitivity, drug rash/allergic reactions, hypothyroidism, anemia, leukopenia, infection, cataracts, and teratogenicity.  Patient understands that they will need regular blood tests to check lipid profile, liver function tests, white blood cell count, thyroid function tests and pregnancy test if applicable. Elidel Counseling: Patient may experience a mild burning sensation during topical application. Elidel is not approved in children less than 2 years of age. There have been case reports of hematologic and skin malignancies in patients using topical calcineurin inhibitors although causality is questionable. Dapsone Pregnancy And Lactation Text: This medication is Pregnancy Category C and is not considered safe during pregnancy or breast feeding. Rifampin Pregnancy And Lactation Text: This medication is Pregnancy Category C and it isn't know if it is safe during pregnancy. It is also excreted in breast milk and should not be used if you are breast feeding. Methotrexate Pregnancy And Lactation Text: This medication is Pregnancy Category X and is known to cause fetal harm. This medication is excreted in breast milk. Erythromycin Counseling:  I discussed with the patient the risks of erythromycin including but not limited to GI upset, allergic reaction, drug rash, diarrhea, increase in liver enzymes, and yeast infections. Tranexamic Acid Pregnancy And Lactation Text: It is unknown if this medication is safe during pregnancy or breast feeding. Skyrizi Counseling: I discussed with the patient the risks of risankizumab-rzaa including but not limited to immunosuppression, and serious infections.  The patient understands that monitoring is required including a PPD at baseline and must alert us or the primary physician if symptoms of infection or other concerning signs are noted. Benzoyl Peroxide Pregnancy And Lactation Text: This medication is Pregnancy Category C. It is unknown if benzoyl peroxide is excreted in breast milk. Azathioprine Counseling:  I discussed with the patient the risks of azathioprine including but not limited to myelosuppression, immunosuppression, hepatotoxicity, lymphoma, and infections.  The patient understands that monitoring is required including baseline LFTs, Creatinine, possible TPMP genotyping and weekly CBCs for the first month and then every 2 weeks thereafter.  The patient verbalized understanding of the proper use and possible adverse effects of azathioprine.  All of the patient's questions and concerns were addressed. Winlevi Counseling:  I discussed with the patient the risks of topical clascoterone including but not limited to erythema, scaling, itching, and stinging. Patient voiced their understanding. Topical Metronidazole Pregnancy And Lactation Text: This medication is Pregnancy Category B and considered safe during pregnancy.  It is also considered safe to use while breastfeeding. Enbrel Counseling:  I discussed with the patient the risks of etanercept including but not limited to myelosuppression, immunosuppression, autoimmune hepatitis, demyelinating diseases, lymphoma, and infections.  The patient understands that monitoring is required including a PPD at baseline and must alert us or the primary physician if symptoms of infection or other concerning signs are noted. Isotretinoin Pregnancy And Lactation Text: This medication is Pregnancy Category X and is considered extremely dangerous during pregnancy. It is unknown if it is excreted in breast milk. Itraconazole Counseling:  I discussed with the patient the risks of itraconazole including but not limited to liver damage, nausea/vomiting, neuropathy, and severe allergy.  The patient understands that this medication is best absorbed when taken with acidic beverages such as non-diet cola or ginger ale.  The patient understands that monitoring is required including baseline LFTs and repeat LFTs at intervals.  The patient understands that they are to contact us or the primary physician if concerning signs are noted. Azithromycin Pregnancy And Lactation Text: This medication is considered safe during pregnancy and is also secreted in breast milk. Rinvoq Counseling: I discussed with the patient the risks of Rinvoq therapy including but not limited to upper respiratory tract infections, shingles, cold sores, bronchitis, nausea, cough, fever, acne, and headache. Live vaccines should be avoided.  This medication has been linked to serious infections; higher rate of mortality; malignancy and lymphoproliferative disorders; major adverse cardiovascular events; thrombosis; thrombocytopenia, anemia, and neutropenia; lipid elevations; liver enzyme elevations; and gastrointestinal perforations. Topical Retinoid counseling:  Patient advised to apply a pea-sized amount only at bedtime and wait 30 minutes after washing their face before applying.  If too drying, patient may add a non-comedogenic moisturizer. The patient verbalized understanding of the proper use and possible adverse effects of retinoids.  All of the patient's questions and concerns were addressed. Adbry Pregnancy And Lactation Text: It is unknown if this medication will adversely affect pregnancy or breast feeding. Protopic Pregnancy And Lactation Text: This medication is Pregnancy Category C. It is unknown if this medication is excreted in breast milk when applied topically. Opioid Pregnancy And Lactation Text: These medications can lead to premature delivery and should be avoided during pregnancy. These medications are also present in breast milk in small amounts. Doxepin Pregnancy And Lactation Text: This medication is Pregnancy Category C and it isn't known if it is safe during pregnancy. It is also excreted in breast milk and breast feeding isn't recommended. Oxybutynin Counseling:  I discussed with the patient the risks of oxybutynin including but not limited to skin rash, drowsiness, dry mouth, difficulty urinating, and blurred vision. Dutasteride Pregnancy And Lactation Text: This medication is absolutely contraindicated in women, especially during pregnancy and breast feeding. Feminization of male fetuses is possible if taking while pregnant. Minoxidil Counseling: Minoxidil is a topical medication which can increase blood flow where it is applied. It is uncertain how this medication increases hair growth. Side effects are uncommon and include stinging and allergic reactions. Niacinamide Pregnancy And Lactation Text: These medications are considered safe during pregnancy. Gabapentin Counseling: I discussed with the patient the risks of gabapentin including but not limited to dizziness, somnolence, fatigue and ataxia. Sarecycline Counseling: Patient advised regarding possible photosensitivity and discoloration of the teeth, skin, lips, tongue and gums.  Patient instructed to avoid sunlight, if possible.  When exposed to sunlight, patients should wear protective clothing, sunglasses, and sunscreen.  The patient was instructed to call the office immediately if the following severe adverse effects occur:  hearing changes, easy bruising/bleeding, severe headache, or vision changes.  The patient verbalized understanding of the proper use and possible adverse effects of sarecycline.  All of the patient's questions and concerns were addressed. Elidel Pregnancy And Lactation Text: This medication is Pregnancy Category C. It is unknown if this medication is excreted in breast milk. Carac Counseling:  I discussed with the patient the risks of Carac including but not limited to erythema, scaling, itching, weeping, crusting, and pain. Erythromycin Pregnancy And Lactation Text: This medication is Pregnancy Category B and is considered safe during pregnancy. It is also excreted in breast milk. Topical Steroids Counseling: I discussed with the patient that prolonged use of topical steroids can result in the increased appearance of superficial blood vessels (telangiectasias), lightening (hypopigmentation) and thinning of the skin (atrophy).  Patient understands to avoid using high potency steroids in skin folds, the groin or the face.  The patient verbalized understanding of the proper use and possible adverse effects of topical steroids.  All of the patient's questions and concerns were addressed. Enbrel Pregnancy And Lactation Text: This medication is Pregnancy Category B and is considered safe during pregnancy. It is unknown if this medication is excreted in breast milk. Prednisone Counseling:  I discussed with the patient the risks of prolonged use of prednisone including but not limited to weight gain, insomnia, osteoporosis, mood changes, diabetes, susceptibility to infection, glaucoma and high blood pressure.  In cases where prednisone use is prolonged, patients should be monitored with blood pressure checks, serum glucose levels and an eye exam.  Additionally, the patient may need to be placed on GI prophylaxis, PCP prophylaxis, and calcium and vitamin D supplementation and/or a bisphosphonate.  The patient verbalized understanding of the proper use and the possible adverse effects of prednisone.  All of the patient's questions and concerns were addressed. Itraconazole Pregnancy And Lactation Text: This medication is Pregnancy Category C and it isn't know if it is safe during pregnancy. It is also excreted in breast milk. Valtrex Counseling: I discussed with the patient the risks of valacyclovir including but not limited to kidney damage, nausea, vomiting and severe allergy.  The patient understands that if the infection seems to be worsening or is not improving, they are to call. Azathioprine Pregnancy And Lactation Text: This medication is Pregnancy Category D and isn't considered safe during pregnancy. It is unknown if this medication is excreted in breast milk. Winlevi Pregnancy And Lactation Text: This medication is considered safe during pregnancy and breastfeeding. High Dose Vitamin A Counseling: Side effects reviewed, pt to contact office should one occur. Bactrim Counseling:  I discussed with the patient the risks of sulfa antibiotics including but not limited to GI upset, allergic reaction, drug rash, diarrhea, dizziness, photosensitivity, and yeast infections.  Rarely, more serious reactions can occur including but not limited to aplastic anemia, agranulocytosis, methemoglobinemia, blood dyscrasias, liver or kidney failure, lung infiltrates or desquamative/blistering drug rashes. Infliximab Counseling:  I discussed with the patient the risks of infliximab including but not limited to myelosuppression, immunosuppression, autoimmune hepatitis, demyelinating diseases, lymphoma, and serious infections.  The patient understands that monitoring is required including a PPD at baseline and must alert us or the primary physician if symptoms of infection or other concerning signs are noted. Oxybutynin Pregnancy And Lactation Text: This medication is Pregnancy Category B and is considered safe during pregnancy. It is unknown if it is excreted in breast milk. Finasteride Male Counseling: Finasteride Counseling:  I discussed with the patient the risks of use of finasteride including but not limited to decreased libido, decreased ejaculate volume, gynecomastia, and depression. Women should not handle medication.  All of the patient's questions and concerns were addressed. Bimzelx Counseling:  I discussed with the patient the risks of Bimzelx including but not limited to depression, immunosuppression, allergic reactions and infections.  The patient understands that monitoring is required including a PPD at baseline and must alert us or the primary physician if symptoms of infection or other concerning signs are noted. Rinvoq Pregnancy And Lactation Text: Based on animal studies, Rinvoq may cause embryo-fetal harm when administered to pregnant women.  The medication should not be used in pregnancy.  Breastfeeding is not recommended during treatment and for 6 days after the last dose. Erivedge Counseling- I discussed with the patient the risks of Erivedge including but not limited to nausea, vomiting, diarrhea, constipation, weight loss, changes in the sense of taste, decreased appetite, muscle spasms, and hair loss.  The patient verbalized understanding of the proper use and possible adverse effects of Erivedge.  All of the patient's questions and concerns were addressed. Hydroxyzine Counseling: Patient advised that the medication is sedating and not to drive a car after taking this medication.  Patient informed of potential adverse effects including but not limited to dry mouth, urinary retention, and blurry vision.  The patient verbalized understanding of the proper use and possible adverse effects of hydroxyzine.  All of the patient's questions and concerns were addressed. Qbrexza Counseling:  I discussed with the patient the risks of Qbrexza including but not limited to headache, mydriasis, blurred vision, dry eyes, nasal dryness, dry mouth, dry throat, dry skin, urinary hesitation, and constipation.  Local skin reactions including erythema, burning, stinging, and itching can also occur. Minoxidil Pregnancy And Lactation Text: This medication has not been assigned a Pregnancy Risk Category but animal studies failed to show danger with the topical medication. It is unknown if the medication is excreted in breast milk. Nsaids Counseling: NSAID Counseling: I discussed with the patient that NSAIDs should be taken with food. Prolonged use of NSAIDs can result in the development of stomach ulcers.  Patient advised to stop taking NSAIDs if abdominal pain occurs.  The patient verbalized understanding of the proper use and possible adverse effects of NSAIDs.  All of the patient's questions and concerns were addressed. Gabapentin Pregnancy And Lactation Text: This medication is Pregnancy Category C and isn't considered safe during pregnancy. It is excreted in breast milk. Sarecycline Pregnancy And Lactation Text: This medication is Pregnancy Category D and not consider safe during pregnancy. It is also excreted in breast milk. Stelara Counseling:  I discussed with the patient the risks of ustekinumab including but not limited to immunosuppression, malignancy, posterior leukoencephalopathy syndrome, and serious infections.  The patient understands that monitoring is required including a PPD at baseline and must alert us or the primary physician if symptoms of infection or other concerning signs are noted. Prednisone Pregnancy And Lactation Text: This medication is Pregnancy Category C and it isn't know if it is safe during pregnancy. This medication is excreted in breast milk. Arava Counseling:  Patient counseled regarding adverse effects of Arava including but not limited to nausea, vomiting, abnormalities in liver function tests. Patients may develop mouth sores, rash, diarrhea, and abnormalities in blood counts. The patient understands that monitoring is required including LFTs and blood counts.  There is a rare possibility of scarring of the liver and lung problems that can occur when taking methotrexate. Persistent nausea, loss of appetite, pale stools, dark urine, cough, and shortness of breath should be reported immediately. Patient advised to discontinue Arava treatment and consult with a physician prior to attempting conception. The patient will have to undergo a treatment to eliminate Arava from the body prior to conception. Carac Pregnancy And Lactation Text: This medication is Pregnancy Category X and contraindicated in pregnancy and in women who may become pregnant. It is unknown if this medication is excreted in breast milk. Metronidazole Counseling:  I discussed with the patient the risks of metronidazole including but not limited to seizures, nausea/vomiting, a metallic taste in the mouth, nausea/vomiting and severe allergy. Eucrisa Counseling: Patient may experience a mild burning sensation during topical application. Eucrisa is not approved in children less than 3 months of age. VTAMA Counseling: I discussed with the patient that VTAMA is not for use in the eyes, mouth or mouth. They should call the office if they develop any signs of allergic reactions to VTAMA. The patient verbalized understanding of the proper use and possible adverse effects of VTAMA.  All of the patient's questions and concerns were addressed. Ketoconazole Counseling:   Patient counseled regarding improving absorption with orange juice.  Adverse effects include but are not limited to breast enlargement, headache, diarrhea, nausea, upset stomach, liver function test abnormalities, taste disturbance, and stomach pain.  There is a rare possibility of liver failure that can occur when taking ketoconazole. The patient understands that monitoring of LFTs may be required, especially at baseline. The patient verbalized understanding of the proper use and possible adverse effects of ketoconazole.  All of the patient's questions and concerns were addressed. Humira Counseling:  I discussed with the patient the risks of adalimumab including but not limited to myelosuppression, immunosuppression, autoimmune hepatitis, demyelinating diseases, lymphoma, and serious infections.  The patient understands that monitoring is required including a PPD at baseline and must alert us or the primary physician if symptoms of infection or other concerning signs are noted. Topical Steroids Applications Pregnancy And Lactation Text: Most topical steroids are considered safe to use during pregnancy and lactation.  Any topical steroid applied to the breast or nipple should be washed off before breastfeeding. Erivedge Pregnancy And Lactation Text: This medication is Pregnancy Category X and is absolutely contraindicated during pregnancy. It is unknown if it is excreted in breast milk. Bactrim Pregnancy And Lactation Text: This medication is Pregnancy Category D and is known to cause fetal risk.  It is also excreted in breast milk. Tazorac Counseling:  Patient advised that medication is irritating and drying.  Patient may need to apply sparingly and wash off after an hour before eventually leaving it on overnight.  The patient verbalized understanding of the proper use and possible adverse effects of tazorac.  All of the patient's questions and concerns were addressed. Sotyktu Counseling:  I discussed the most common side effects of Sotyktu including: common cold, sore throat, sinus infections, cold sores, canker sores, folliculitis, and acne.  I also discussed more serious side effects of Sotyktu including but not limited to: serious allergic reactions; increased risk for infections such as TB; cancers such as lymphomas; rhabdomyolysis and elevated CPK; and elevated triglycerides and liver enzymes.  Bimzelx Pregnancy And Lactation Text: This medication crosses the placenta and the safety is uncertain during pregnancy. It is unknown if this medication is present in breast milk. Valtrex Pregnancy And Lactation Text: this medication is Pregnancy Category B and is considered safe during pregnancy. This medication is not directly found in breast milk but it's metabolite acyclovir is present. Hydroxyzine Pregnancy And Lactation Text: This medication is not safe during pregnancy and should not be taken. It is also excreted in breast milk and breast feeding isn't recommended. Cellcept Counseling:  I discussed with the patient the risks of mycophenolate mofetil including but not limited to infection/immunosuppression, GI upset, hypokalemia, hypercholesterolemia, bone marrow suppression, lymphoproliferative disorders, malignancy, GI ulceration/bleed/perforation, colitis, interstitial lung disease, kidney failure, progressive multifocal leukoencephalopathy, and birth defects.  The patient understands that monitoring is required including a baseline creatinine and regular CBC testing. In addition, patient must alert us immediately if symptoms of infection or other concerning signs are noted. Propranolol Counseling:  I discussed with the patient the risks of propranolol including but not limited to low heart rate, low blood pressure, low blood sugar, restlessness and increased cold sensitivity. They should call the office if they experience any of these side effects. High Dose Vitamin A Pregnancy And Lactation Text: High dose vitamin A therapy is contraindicated during pregnancy and breast feeding. Finasteride Female Counseling: Finasteride Counseling:  I discussed with the patient the risks of use of finasteride including but not limited to decreased libido and sexual dysfunction. Explained the teratogenic nature of the medication and stressed the importance of not getting pregnant during treatment. All of the patient's questions and concerns were addressed. Nsaids Pregnancy And Lactation Text: These medications are considered safe up to 30 weeks gestation. It is excreted in breast milk. Glycopyrrolate Counseling:  I discussed with the patient the risks of glycopyrrolate including but not limited to skin rash, drowsiness, dry mouth, difficulty urinating, and blurred vision. Qbrexza Pregnancy And Lactation Text: There is no available data on Qbrexza use in pregnant women.  There is no available data on Qbrexza use in lactation. Tetracycline Counseling: Patient counseled regarding possible photosensitivity and increased risk for sunburn.  Patient instructed to avoid sunlight, if possible.  When exposed to sunlight, patients should wear protective clothing, sunglasses, and sunscreen.  The patient was instructed to call the office immediately if the following severe adverse effects occur:  hearing changes, easy bruising/bleeding, severe headache, or vision changes.  The patient verbalized understanding of the proper use and possible adverse effects of tetracycline.  All of the patient's questions and concerns were addressed. Patient understands to avoid pregnancy while on therapy due to potential birth defects. Mirvaso Counseling: Mirvaso is a topical medication which can decrease superficial blood flow where applied. Side effects are uncommon and include stinging, redness and allergic reactions. Topical Sulfur Applications Counseling: Topical Sulfur Counseling: Patient counseled that this medication may cause skin irritation or allergic reactions.  In the event of skin irritation, the patient was advised to reduce the amount of the drug applied or use it less frequently.   The patient verbalized understanding of the proper use and possible adverse effects of topical sulfur application.  All of the patient's questions and concerns were addressed. Metronidazole Pregnancy And Lactation Text: This medication is Pregnancy Category B and considered safe during pregnancy.  It is also excreted in breast milk. Rituxan Counseling:  I discussed with the patient the risks of Rituxan infusions. Side effects can include infusion reactions, severe drug rashes including mucocutaneous reactions, reactivation of latent hepatitis and other infections and rarely progressive multifocal leukoencephalopathy.  All of the patient's questions and concerns were addressed. Use Enhanced Medication Counseling?: No Cephalexin Counseling: I counseled the patient regarding use of cephalexin as an antibiotic for prophylactic and/or therapeutic purposes. Cephalexin (commonly prescribed under brand name Keflex) is a cephalosporin antibiotic which is active against numerous classes of bacteria, including most skin bacteria. Side effects may include nausea, diarrhea, gastrointestinal upset, rash, hives, yeast infections, and in rare cases, hepatitis, kidney disease, seizures, fever, confusion, neurologic symptoms, and others. Patients with severe allergies to penicillin medications are cautioned that there is about a 10% incidence of cross-reactivity with cephalosporins. When possible, patients with penicillin allergies should use alternatives to cephalosporins for antibiotic therapy. Ketoconazole Pregnancy And Lactation Text: This medication is Pregnancy Category C and it isn't know if it is safe during pregnancy. It is also excreted in breast milk and breast feeding isn't recommended. Calcipotriene Counseling:  I discussed with the patient the risks of calcipotriene including but not limited to erythema, scaling, itching, and irritation. Vtama Pregnancy And Lactation Text: It is unknown if this medication can cause problems during pregnancy and breastfeeding. Libtayo Counseling- I discussed with the patient the risks of Libtayo including but not limited to nausea, vomiting, diarrhea, and bone or muscle pain.  The patient verbalized understanding of the proper use and possible adverse effects of Libtayo.  All of the patient's questions and concerns were addressed. Tazorac Pregnancy And Lactation Text: This medication is not safe during pregnancy. It is unknown if this medication is excreted in breast milk. Cimzia Counseling:  I discussed with the patient the risks of Cimzia including but not limited to immunosuppression, allergic reactions and infections.  The patient understands that monitoring is required including a PPD at baseline and must alert us or the primary physician if symptoms of infection or other concerning signs are noted. Sotyktu Pregnancy And Lactation Text: There is insufficient data to evaluate whether or not Sotyktu is safe to use during pregnancy.   It is not known if Sotyktu passes into breast milk and whether or not it is safe to use when breastfeeding.   Propranolol Pregnancy And Lactation Text: This medication is Pregnancy Category C and it isn't known if it is safe during pregnancy. It is excreted in breast milk. Rhofade Counseling: Rhofade is a topical medication which can decrease superficial blood flow where applied. Side effects are uncommon and include stinging, redness and allergic reactions. Olanzapine Counseling- I discussed with the patient the common side effects of olanzapine including but are not limited to: lack of energy, dry mouth, increased appetite, sleepiness, tremor, constipation, dizziness, changes in behavior, or restlessness.  Explained that teenagers are more likely to experience headaches, abdominal pain, pain in the arms or legs, tiredness, and sleepiness.  Serious side effects include but are not limited: increased risk of death in elderly patients who are confused, have memory loss, or dementia-related psychosis; hyperglycemia; increased cholesterol and triglycerides; and weight gain. Finasteride Pregnancy And Lactation Text: This medication is absolutely contraindicated during pregnancy. It is unknown if it is excreted in breast milk. Calcipotriene Pregnancy And Lactation Text: The use of this medication during pregnancy or lactation is not recommended as there is insufficient data. Glycopyrrolate Pregnancy And Lactation Text: This medication is Pregnancy Category B and is considered safe during pregnancy. It is unknown if it is excreted breast milk. Cibinqo Counseling: I discussed with the patient the risks of Cibinqo therapy including but not limited to common cold, nausea, headache, cold sores, increased blood CPK levels, dizziness, UTIs, fatigue, acne, and vomitting. Live vaccines should be avoided.  This medication has been linked to serious infections; higher rate of mortality; malignancy and lymphoproliferative disorders; major adverse cardiovascular events; thrombosis; thrombocytopenia and lymphopenia; lipid elevations; and retinal detachment. Taltz Counseling: I discussed with the patient the risks of ixekizumab including but not limited to immunosuppression, serious infections, worsening of inflammatory bowel disease and drug reactions.  The patient understands that monitoring is required including a PPD at baseline and must alert us or the primary physician if symptoms of infection or other concerning signs are noted. Mirvaso Pregnancy And Lactation Text: This medication has not been assigned a Pregnancy Risk Category. It is unknown if the medication is excreted in breast milk. Albendazole Counseling:  I discussed with the patient the risks of albendazole including but not limited to cytopenia, kidney damage, nausea/vomiting and severe allergy.  The patient understands that this medication is being used in an off-label manner. Minocycline Counseling: Patient advised regarding possible photosensitivity and discoloration of the teeth, skin, lips, tongue and gums.  Patient instructed to avoid sunlight, if possible.  When exposed to sunlight, patients should wear protective clothing, sunglasses, and sunscreen.  The patient was instructed to call the office immediately if the following severe adverse effects occur:  hearing changes, easy bruising/bleeding, severe headache, or vision changes.  The patient verbalized understanding of the proper use and possible adverse effects of minocycline.  All of the patient's questions and concerns were addressed. Hydroquinone Counseling:  Patient advised that medication may result in skin irritation, lightening (hypopigmentation), dryness, and burning.  In the event of skin irritation, the patient was advised to reduce the amount of the drug applied or use it less frequently.  Rarely, spots that are treated with hydroquinone can become darker (pseudoochronosis).  Should this occur, patient instructed to stop medication and call the office. The patient verbalized understanding of the proper use and possible adverse effects of hydroquinone.  All of the patient's questions and concerns were addressed. Hyrimoz Counseling:  I discussed with the patient the risks of adalimumab including but not limited to myelosuppression, immunosuppression, autoimmune hepatitis, demyelinating diseases, lymphoma, and serious infections.  The patient understands that monitoring is required including a PPD at baseline and must alert us or the primary physician if symptoms of infection or other concerning signs are noted. Terbinafine Counseling: Patient counseling regarding adverse effects of terbinafine including but not limited to headache, diarrhea, rash, upset stomach, liver function test abnormalities, itching, taste/smell disturbance, nausea, abdominal pain, and flatulence.  There is a rare possibility of liver failure that can occur when taking terbinafine.  The patient understands that a baseline LFT and kidney function test may be required. The patient verbalized understanding of the proper use and possible adverse effects of terbinafine.  All of the patient's questions and concerns were addressed. Cimzia Pregnancy And Lactation Text: This medication crosses the placenta but can be considered safe in certain situations. Cimzia may be excreted in breast milk. Clofazimine Counseling:  I discussed with the patient the risks of clofazimine including but not limited to skin and eye pigmentation, liver damage, nausea/vomiting, gastrointestinal bleeding and allergy. Cephalexin Pregnancy And Lactation Text: This medication is Pregnancy Category B and considered safe during pregnancy.  It is also excreted in breast milk but can be used safely for shorter doses. Cantharidin Counseling:  I discussed with the patient the risks of Cantharidin including but not limited to pain, redness, burning, itching, and blistering. Zoryve Counseling:  I discussed with the patient that Zoryve is not for use in the eyes, mouth or vagina. The most commonly reported side effects include diarrhea, headache, insomnia, application site pain, upper respiratory tract infections, and urinary tract infections.  All of the patient's questions and concerns were addressed. Cyclophosphamide Counseling:  I discussed with the patient the risks of cyclophosphamide including but not limited to hair loss, hormonal abnormalities, decreased fertility, abdominal pain, diarrhea, nausea and vomiting, bone marrow suppression and infection. The patient understands that monitoring is required while taking this medication. SSKI Counseling:  I discussed with the patient the risks of SSKI including but not limited to thyroid abnormalities, metallic taste, GI upset, fever, headache, acne, arthralgias, paraesthesias, lymphadenopathy, easy bleeding, arrhythmias, and allergic reaction. Rituxan Pregnancy And Lactation Text: This medication is Pregnancy Category C and it isn't know if it is safe during pregnancy. It is unknown if this medication is excreted in breast milk but similar antibodies are known to be excreted. Aklief counseling:  Patient advised to apply a pea-sized amount only at bedtime and wait 30 minutes after washing their face before applying.  If too drying, patient may add a non-comedogenic moisturizer.  The most commonly reported side effects including irritation, redness, scaling, dryness, stinging, burning, itching, and increased risk of sunburn.  The patient verbalized understanding of the proper use and possible adverse effects of retinoids.  All of the patient's questions and concerns were addressed. Topical Sulfur Applications Pregnancy And Lactation Text: This medication is considered safe during pregnancy and breast feeding secondary to limited systemic absorption. Libtayo Pregnancy And Lactation Text: This medication is contraindicated in pregnancy and when breast feeding. Topical Clindamycin Counseling: Patient counseled that this medication may cause skin irritation or allergic reactions.  In the event of skin irritation, the patient was advised to reduce the amount of the drug applied or use it less frequently.   The patient verbalized understanding of the proper use and possible adverse effects of clindamycin.  All of the patient's questions and concerns were addressed. Cibinqo Pregnancy And Lactation Text: It is unknown if this medication will adversely affect pregnancy or breast feeding.  You should not take this medication if you are currently pregnant or planning a pregnancy or while breastfeeding. Xeljanz Counseling: I discussed with the patient the risks of Xeljanz therapy including increased risk of infection, liver issues, headache, diarrhea, or cold symptoms. Live vaccines should be avoided. They were instructed to call if they have any problems. Birth Control Pills Counseling: Birth Control Pill Counseling: I discussed with the patient the potential side effects of OCPs including but not limited to increased risk of stroke, heart attack, thrombophlebitis, deep venous thrombosis, hepatic adenomas, breast changes, GI upset, headaches, and depression.  The patient verbalized understanding of the proper use and possible adverse effects of OCPs. All of the patient's questions and concerns were addressed. Cantharidin Pregnancy And Lactation Text: This medication has not been proven safe during pregnancy. It is unknown if this medication is excreted in breast milk. Opzelura Counseling:  I discussed with the patient the risks of Opzelura including but not limited to nasopharngitis, bronchitis, ear infection, eosinophila, hives, diarrhea, folliculitis, tonsillitis, and rhinorrhea.  Taken orally, this medication has been linked to serious infections; higher rate of mortality; malignancy and lymphoproliferative disorders; major adverse cardiovascular events; thrombosis; thrombocytopenia, anemia, and neutropenia; and lipid elevations. Olanzapine Pregnancy And Lactation Text: This medication is pregnancy category C.   There are no adequate and well controlled trials with olanzapine in pregnant females.  Olanzapine should be used during pregnancy only if the potential benefit justifies the potential risk to the fetus.   In a study in lactating healthy women, olanzapine was excreted in breast milk.  It is recommended that women taking olanzapine should not breast feed. Hydroxychloroquine Counseling:  I discussed with the patient that a baseline ophthalmologic exam is needed at the start of therapy and every year thereafter while on therapy. A CBC may also be warranted for monitoring.  The side effects of this medication were discussed with the patient, including but not limited to agranulocytosis, aplastic anemia, seizures, rashes, retinopathy, and liver toxicity. Patient instructed to call the office should any adverse effect occur.  The patient verbalized understanding of the proper use and possible adverse effects of Plaquenil.  All the patient's questions and concerns were addressed. 5-Fu Counseling: 5-Fluorouracil Counseling:  I discussed with the patient the risks of 5-fluorouracil including but not limited to erythema, scaling, itching, weeping, crusting, and pain. Wartpeel Counseling:  I discussed with the patient the risks of Wartpeel including but not limited to erythema, scaling, itching, weeping, crusting, and pain. Odomzo Counseling- I discussed with the patient the risks of Odomzo including but not limited to nausea, vomiting, diarrhea, constipation, weight loss, changes in the sense of taste, decreased appetite, muscle spasms, and hair loss.  The patient verbalized understanding of the proper use and possible adverse effects of Odomzo.  All of the patient's questions and concerns were addressed. Albendazole Pregnancy And Lactation Text: This medication is Pregnancy Category C and it isn't known if it is safe during pregnancy. It is also excreted in breast milk. Terbinafine Pregnancy And Lactation Text: This medication is Pregnancy Category B and is considered safe during pregnancy. It is also excreted in breast milk and breast feeding isn't recommended. Xellizz Pregnancy And Lactation Text: This medication is Pregnancy Category D and is not considered safe during pregnancy.  The risk during breast feeding is also uncertain. Clindamycin Counseling: I counseled the patient regarding use of clindamycin as an antibiotic for prophylactic and/or therapeutic purposes. Clindamycin is active against numerous classes of bacteria, including skin bacteria. Side effects may include nausea, diarrhea, gastrointestinal upset, rash, hives, yeast infections, and in rare cases, colitis. Cyclophosphamide Pregnancy And Lactation Text: This medication is Pregnancy Category D and it isn't considered safe during pregnancy. This medication is excreted in breast milk. Siliq Counseling:  I discussed with the patient the risks of Siliq including but not limited to new or worsening depression, suicidal thoughts and behavior, immunosuppression, malignancy, posterior leukoencephalopathy syndrome, and serious infections.  The patient understands that monitoring is required including a PPD at baseline and must alert us or the primary physician if symptoms of infection or other concerning signs are noted. There is also a special program designed to monitor depression which is required with Siliq. Aklief Pregnancy And Lactation Text: It is unknown if this medication is safe to use during pregnancy.  It is unknown if this medication is excreted in breast milk.  Breastfeeding women should use the topical cream on the smallest area of the skin for the shortest time needed while breastfeeding.  Do not apply to nipple and areola. Sski Pregnancy And Lactation Text: This medication is Pregnancy Category D and isn't considered safe during pregnancy. It is excreted in breast milk. Birth Control Pills Pregnancy And Lactation Text: This medication should be avoided if pregnant and for the first 30 days post-partum. Cosentyx Counseling:  I discussed with the patient the risks of Cosentyx including but not limited to worsening of Crohn's disease, immunosuppression, allergic reactions and infections.  The patient understands that monitoring is required including a PPD at baseline and must alert us or the primary physician if symptoms of infection or other concerning signs are noted. Fluconazole Counseling:  Patient counseled regarding adverse effects of fluconazole including but not limited to headache, diarrhea, nausea, upset stomach, liver function test abnormalities, taste disturbance, and stomach pain.  There is a rare possibility of liver failure that can occur when taking fluconazole.  The patient understands that monitoring of LFTs and kidney function test may be required, especially at baseline. The patient verbalized understanding of the proper use and possible adverse effects of fluconazole.  All of the patient's questions and concerns were addressed. Litfulo Counseling: I discussed with the patient the risks of Litfulo therapy including but not limited to upper respiratory tract infections, shingles, cold sores, and nausea. Live vaccines should be avoided.  This medication has been linked to serious infections; higher rate of mortality; malignancy and lymphoproliferative disorders; major adverse cardiovascular events; thrombosis; gastrointestinal perforations; neutropenia; lymphopenia; anemia; liver enzyme elevations; and lipid elevations. Solaraze Counseling:  I discussed with the patient the risks of Solaraze including but not limited to erythema, scaling, itching, weeping, crusting, and pain. Opzelura Pregnancy And Lactation Text: There is insufficient data to evaluate drug-associated risk for major birth defects, miscarriage, or other adverse maternal or fetal outcomes.  There is a pregnancy registry that monitors pregnancy outcomes in pregnant persons exposed to the medication during pregnancy.  It is unknown if this medication is excreted in breast milk.  Do not breastfeed during treatment and for about 4 weeks after the last dose. Oral Minoxidil Counseling- I discussed with the patient the risks of oral minoxidil including but not limited to shortness of breath, swelling of the feet or ankles, dizziness, lightheadedness, unwanted hair growth and allergic reaction.  The patient verbalized understanding of the proper use and possible adverse effects of oral minoxidil.  All of the patient's questions and concerns were addressed. Hydroxychloroquine Pregnancy And Lactation Text: This medication has been shown to cause fetal harm but it isn't assigned a Pregnancy Risk Category. There are small amounts excreted in breast milk. Imiquimod Counseling:  I discussed with the patient the risks of imiquimod including but not limited to erythema, scaling, itching, weeping, crusting, and pain.  Patient understands that the inflammatory response to imiquimod is variable from person to person and was educated regarded proper titration schedule.  If flu-like symptoms develop, patient knows to discontinue the medication and contact us. Ivermectin Counseling:  Patient instructed to take medication on an empty stomach with a full glass of water.  Patient informed of potential adverse effects including but not limited to nausea, diarrhea, dizziness, itching, and swelling of the extremities or lymph nodes.  The patient verbalized understanding of the proper use and possible adverse effects of ivermectin.  All of the patient's questions and concerns were addressed. Colchicine Counseling:  Patient counseled regarding adverse effects including but not limited to stomach upset (nausea, vomiting, stomach pain, or diarrhea).  Patient instructed to limit alcohol consumption while taking this medication.  Colchicine may reduce blood counts especially with prolonged use.  The patient understands that monitoring of kidney function and blood counts may be required, especially at baseline. The patient verbalized understanding of the proper use and possible adverse effects of colchicine.  All of the patient's questions and concerns were addressed. Tremfya Counseling: I discussed with the patient the risks of guselkumab including but not limited to immunosuppression, serious infections, and drug reactions.  The patient understands that monitoring is required including a PPD at baseline and must alert us or the primary physician if symptoms of infection or other concerning signs are noted. Ilumya Counseling: I discussed with the patient the risks of tildrakizumab including but not limited to immunosuppression, malignancy, posterior leukoencephalopathy syndrome, and serious infections.  The patient understands that monitoring is required including a PPD at baseline and must alert us or the primary physician if symptoms of infection or other concerning signs are noted. Zyclara Counseling:  I discussed with the patient the risks of imiquimod including but not limited to erythema, scaling, itching, weeping, crusting, and pain.  Patient understands that the inflammatory response to imiquimod is variable from person to person and was educated regarded proper titration schedule.  If flu-like symptoms develop, patient knows to discontinue the medication and contact us. Quinolones Counseling:  I discussed with the patient the risks of fluoroquinolones including but not limited to GI upset, allergic reaction, drug rash, diarrhea, dizziness, photosensitivity, yeast infections, liver function test abnormalities, tendonitis/tendon rupture. Topical Ketoconazole Counseling: Patient counseled that this medication may cause skin irritation or allergic reactions.  In the event of skin irritation, the patient was advised to reduce the amount of the drug applied or use it less frequently.   The patient verbalized understanding of the proper use and possible adverse effects of ketoconazole.  All of the patient's questions and concerns were addressed. Clindamycin Pregnancy And Lactation Text: This medication can be used in pregnancy if certain situations. Clindamycin is also present in breast milk. Azelaic Acid Counseling: Patient counseled that medicine may cause skin irritation and to avoid applying near the eyes.  In the event of skin irritation, the patient was advised to reduce the amount of the drug applied or use it less frequently.   The patient verbalized understanding of the proper use and possible adverse effects of azelaic acid.  All of the patient's questions and concerns were addressed. Cyclosporine Counseling:  I discussed with the patient the risks of cyclosporine including but not limited to hypertension, gingival hyperplasia,myelosuppression, immunosuppression, liver damage, kidney damage, neurotoxicity, lymphoma, and serious infections. The patient understands that monitoring is required including baseline blood pressure, CBC, CMP, lipid panel and uric acid, and then 1-2 times monthly CMP and blood pressure. Solaraze Pregnancy And Lactation Text: This medication is Pregnancy Category B and is considered safe. There is some data to suggest avoiding during the third trimester. It is unknown if this medication is excreted in breast milk. Spironolactone Counseling: Patient advised regarding risks of diarrhea, abdominal pain, hyperkalemia, birth defects (for female patients), liver toxicity and renal toxicity. The patient may need blood work to monitor liver and kidney function and potassium levels while on therapy. The patient verbalized understanding of the proper use and possible adverse effects of spironolactone.  All of the patient's questions and concerns were addressed. Thalidomide Counseling: I discussed with the patient the risks of thalidomide including but not limited to birth defects, anxiety, weakness, chest pain, dizziness, cough and severe allergy. Detail Level: Simple Oral Minoxidil Pregnancy And Lactation Text: This medication should only be used when clearly needed if you are pregnant, attempting to become pregnant or breast feeding. Litfulo Pregnancy And Lactation Text: Based on animal studies, Lifulo may cause embryo-fetal harm when administered to pregnant women.  The medication should not be used in pregnancy.  Breastfeeding is not recommended during treatment. Low Dose Naltrexone Counseling- I discussed with the patient the potential risks and side effects of low dose naltrexone including but not limited to: more vivid dreams, headaches, nausea, vomiting, abdominal pain, fatigue, dizziness, and anxiety. Cimetidine Counseling:  I discussed with the patient the risks of Cimetidine including but not limited to gynecomastia, headache, diarrhea, nausea, drowsiness, arrhythmias, pancreatitis, skin rashes, psychosis, bone marrow suppression and kidney toxicity. Acitretin Counseling:  I discussed with the patient the risks of acitretin including but not limited to hair loss, dry lips/skin/eyes, liver damage, hyperlipidemia, depression/suicidal ideation, photosensitivity.  Serious rare side effects can include but are not limited to pancreatitis, pseudotumor cerebri, bony changes, clot formation/stroke/heart attack.  Patient understands that alcohol is contraindicated since it can result in liver toxicity and significantly prolong the elimination of the drug by many years. Picato Counseling:  I discussed with the patient the risks of Picato including but not limited to erythema, scaling, itching, weeping, crusting, and pain. Simponi Counseling:  I discussed with the patient the risks of golimumab including but not limited to myelosuppression, immunosuppression, autoimmune hepatitis, demyelinating diseases, lymphoma, and serious infections.  The patient understands that monitoring is required including a PPD at baseline and must alert us or the primary physician if symptoms of infection or other concerning signs are noted. Doxycycline Counseling:  Patient counseled regarding possible photosensitivity and increased risk for sunburn.  Patient instructed to avoid sunlight, if possible.  When exposed to sunlight, patients should wear protective clothing, sunglasses, and sunscreen.  The patient was instructed to call the office immediately if the following severe adverse effects occur:  hearing changes, easy bruising/bleeding, severe headache, or vision changes.  The patient verbalized understanding of the proper use and possible adverse effects of doxycycline.  All of the patient's questions and concerns were addressed. Drysol Counseling:  I discussed with the patient the risks of drysol/aluminum chloride including but not limited to skin rash, itching, irritation, burning. Griseofulvin Counseling:  I discussed with the patient the risks of griseofulvin including but not limited to photosensitivity, cytopenia, liver damage, nausea/vomiting and severe allergy.  The patient understands that this medication is best absorbed when taken with a fatty meal (e.g., ice cream or french fries). Dupixent Counseling: I discussed with the patient the risks of dupilumab including but not limited to eye inflammation and irritation, cold sores, injection site reactions, allergic reactions and increased risk of parasitic infection. The patient understands that monitoring is required and they must alert us or the primary physician if symptoms of infection or other concerning signs are noted. Olumiant Counseling: I discussed with the patient the risks of Olumiant therapy including but not limited to upper respiratory tract infections, shingles, cold sores, and nausea. Live vaccines should be avoided.  This medication has been linked to serious infections; higher rate of mortality; malignancy and lymphoproliferative disorders; major adverse cardiovascular events; thrombosis; gastrointestinal perforations; neutropenia; lymphopenia; anemia; liver enzyme elevations; and lipid elevations. Soolantra Counseling: I discussed with the patients the risks of topial Soolantra. This is a medicine which decreases the number of mites and inflammation in the skin. You experience burning, stinging, eye irritation or allergic reactions.  Please call our office if you develop any problems from using this medication. Spironolactone Pregnancy And Lactation Text: This medication can cause feminization of the male fetus and should be avoided during pregnancy. The active metabolite is also found in breast milk. Otezla Counseling: The side effects of Otezla were discussed with the patient, including but not limited to worsening or new depression, weight loss, diarrhea, nausea, upper respiratory tract infection, and headache. Patient instructed to call the office should any adverse effect occur.  The patient verbalized understanding of the proper use and possible adverse effects of Otezla.  All the patient's questions and concerns were addressed. Acitretin Pregnancy And Lactation Text: This medication is Pregnancy Category X and should not be given to women who are pregnant or may become pregnant in the future. This medication is excreted in breast milk. Bexarotene Pregnancy And Lactation Text: This medication is Pregnancy Category X and should not be given to women who are pregnant or may become pregnant. This medication should not be used if you are breast feeding. Dutasteride Male Counseling: Dutasteride Counseling:  I discussed with the patient the risks of use of dutasteride including but not limited to decreased libido, decreased ejaculate volume, and gynecomastia. Women who can become pregnant should not handle medication.  All of the patient's questions and concerns were addressed. Low Dose Naltrexone Pregnancy And Lactation Text: Naltrexone is pregnancy category C.  There have been no adequate and well-controlled studies in pregnant women.  It should be used in pregnancy only if the potential benefit justifies the potential risk to the fetus.   Limited data indicates that naltrexone is minimally excreted into breastmilk. Dapsone Counseling: I discussed with the patient the risks of dapsone including but not limited to hemolytic anemia, agranulocytosis, rashes, methemoglobinemia, kidney failure, peripheral neuropathy, headaches, GI upset, and liver toxicity.  Patients who start dapsone require monitoring including baseline LFTs and weekly CBCs for the first month, then every month thereafter.  The patient verbalized understanding of the proper use and possible adverse effects of dapsone.  All of the patient's questions and concerns were addressed. Rifampin Counseling: I discussed with the patient the risks of rifampin including but not limited to liver damage, kidney damage, red-orange body fluids, nausea/vomiting and severe allergy. Xolair Counseling:  Patient informed of potential adverse effects including but not limited to fever, muscle aches, rash and allergic reactions.  The patient verbalized understanding of the proper use and possible adverse effects of Xolair.  All of the patient's questions and concerns were addressed. Klisyri Counseling:  I discussed with the patient the risks of Klisyri including but not limited to erythema, scaling, itching, weeping, crusting, and pain.

## 2025-04-01 NOTE — PATIENT PROFILE ADULT - HAS THE PATIENT RECEIVED THE INFLUENZA VACCINE THIS SEASON?
- She has a history of high blood pressure.  - She is advised to continue monitoring her blood pressure regularly.  - Continue atenolol   - Labs as below   no...